# Patient Record
Sex: MALE | Race: WHITE | Employment: FULL TIME
[De-identification: names, ages, dates, MRNs, and addresses within clinical notes are randomized per-mention and may not be internally consistent; named-entity substitution may affect disease eponyms.]

---

## 2017-01-05 ENCOUNTER — OFFICE VISIT (OUTPATIENT)
Dept: FAMILY MEDICINE CLINIC | Facility: CLINIC | Age: 47
End: 2017-01-05

## 2017-01-05 VITALS
BODY MASS INDEX: 25.33 KG/M2 | WEIGHT: 171 LBS | SYSTOLIC BLOOD PRESSURE: 133 MMHG | HEART RATE: 68 BPM | HEIGHT: 69 IN | DIASTOLIC BLOOD PRESSURE: 80 MMHG

## 2017-01-05 DIAGNOSIS — M25.561 CHRONIC PAIN OF RIGHT KNEE: Primary | ICD-10-CM

## 2017-01-05 DIAGNOSIS — G89.29 CHRONIC PAIN OF RIGHT KNEE: Primary | ICD-10-CM

## 2017-01-05 PROCEDURE — 99213 OFFICE O/P EST LOW 20 MIN: CPT | Performed by: INTERNAL MEDICINE

## 2017-01-06 ENCOUNTER — TELEPHONE (OUTPATIENT)
Dept: ORTHOPEDIC SURGERY | Facility: CLINIC | Age: 47
End: 2017-01-06

## 2017-01-10 ENCOUNTER — TELEPHONE (OUTPATIENT)
Dept: ORTHOPEDIC SURGERY | Facility: CLINIC | Age: 47
End: 2017-01-10

## 2017-03-21 DIAGNOSIS — M25.561 CHRONIC PAIN OF RIGHT KNEE: Primary | ICD-10-CM

## 2017-03-21 DIAGNOSIS — G89.29 CHRONIC PAIN OF RIGHT KNEE: Primary | ICD-10-CM

## 2017-03-23 ENCOUNTER — OFFICE VISIT (OUTPATIENT)
Dept: ORTHOPEDIC SURGERY | Age: 47
End: 2017-03-23
Payer: COMMERCIAL

## 2017-03-23 VITALS — HEIGHT: 69 IN | WEIGHT: 169.75 LBS | BODY MASS INDEX: 25.14 KG/M2

## 2017-03-23 DIAGNOSIS — M17.11 ARTHRITIS OF RIGHT KNEE: Primary | ICD-10-CM

## 2017-03-23 PROCEDURE — 99243 OFF/OP CNSLTJ NEW/EST LOW 30: CPT | Performed by: ORTHOPAEDIC SURGERY

## 2017-03-23 RX ORDER — DICLOFENAC SODIUM 75 MG/1
75 TABLET, DELAYED RELEASE ORAL 2 TIMES DAILY
Qty: 60 TABLET | Refills: 2 | Status: SHIPPED | OUTPATIENT
Start: 2017-03-23 | End: 2017-09-26

## 2017-03-23 ASSESSMENT — ENCOUNTER SYMPTOMS
CONSTIPATION: 0
DIARRHEA: 0
NAUSEA: 0
COUGH: 0

## 2017-09-26 ENCOUNTER — OFFICE VISIT (OUTPATIENT)
Dept: FAMILY MEDICINE CLINIC | Age: 47
End: 2017-09-26
Payer: COMMERCIAL

## 2017-09-26 VITALS
RESPIRATION RATE: 16 BRPM | DIASTOLIC BLOOD PRESSURE: 82 MMHG | BODY MASS INDEX: 24.68 KG/M2 | WEIGHT: 166.6 LBS | SYSTOLIC BLOOD PRESSURE: 131 MMHG | TEMPERATURE: 98.5 F | HEIGHT: 69 IN | HEART RATE: 74 BPM

## 2017-09-26 DIAGNOSIS — M25.561 CHRONIC PAIN OF RIGHT KNEE: Primary | ICD-10-CM

## 2017-09-26 DIAGNOSIS — G89.29 CHRONIC PAIN OF RIGHT KNEE: Primary | ICD-10-CM

## 2017-09-26 DIAGNOSIS — Z13.1 DIABETES MELLITUS SCREENING: ICD-10-CM

## 2017-09-26 LAB — HBA1C MFR BLD: 5 %

## 2017-09-26 PROCEDURE — 83036 HEMOGLOBIN GLYCOSYLATED A1C: CPT | Performed by: INTERNAL MEDICINE

## 2017-09-26 PROCEDURE — 99213 OFFICE O/P EST LOW 20 MIN: CPT | Performed by: INTERNAL MEDICINE

## 2020-01-20 ENCOUNTER — OFFICE VISIT (OUTPATIENT)
Dept: FAMILY MEDICINE CLINIC | Age: 50
End: 2020-01-20
Payer: COMMERCIAL

## 2020-01-20 VITALS
HEIGHT: 69 IN | DIASTOLIC BLOOD PRESSURE: 88 MMHG | TEMPERATURE: 97.3 F | HEART RATE: 59 BPM | WEIGHT: 162 LBS | OXYGEN SATURATION: 96 % | BODY MASS INDEX: 23.99 KG/M2 | SYSTOLIC BLOOD PRESSURE: 135 MMHG

## 2020-01-20 PROCEDURE — 99214 OFFICE O/P EST MOD 30 MIN: CPT | Performed by: NURSE PRACTITIONER

## 2020-01-20 RX ORDER — PREDNISONE 20 MG/1
20 TABLET ORAL DAILY
COMMUNITY
End: 2020-04-05

## 2020-01-20 RX ORDER — BENZONATATE 100 MG/1
100 CAPSULE ORAL 3 TIMES DAILY PRN
COMMUNITY
End: 2020-07-07

## 2020-01-20 RX ORDER — AZITHROMYCIN 250 MG/1
250 TABLET, FILM COATED ORAL DAILY
COMMUNITY
End: 2020-04-05

## 2020-01-20 RX ORDER — VARENICLINE TARTRATE 25 MG
KIT ORAL
Qty: 1 BOX | Refills: 0 | Status: SHIPPED | OUTPATIENT
Start: 2020-01-20 | End: 2020-07-13

## 2020-01-20 RX ORDER — VARENICLINE TARTRATE 1 MG/1
1 TABLET, FILM COATED ORAL 2 TIMES DAILY
Qty: 60 TABLET | Refills: 3 | Status: SHIPPED | OUTPATIENT
Start: 2020-01-20 | End: 2020-07-13

## 2020-01-20 ASSESSMENT — PATIENT HEALTH QUESTIONNAIRE - PHQ9
SUM OF ALL RESPONSES TO PHQ9 QUESTIONS 1 & 2: 0
SUM OF ALL RESPONSES TO PHQ QUESTIONS 1-9: 0
SUM OF ALL RESPONSES TO PHQ QUESTIONS 1-9: 0
2. FEELING DOWN, DEPRESSED OR HOPELESS: 0
1. LITTLE INTEREST OR PLEASURE IN DOING THINGS: 0

## 2020-01-20 ASSESSMENT — ENCOUNTER SYMPTOMS
COUGH: 1
SINUS PRESSURE: 0
RHINORRHEA: 0
SORE THROAT: 0
SHORTNESS OF BREATH: 0
HEMOPTYSIS: 0
TROUBLE SWALLOWING: 0
HEARTBURN: 0
ABDOMINAL PAIN: 0
WHEEZING: 0
CHEST TIGHTNESS: 0

## 2020-01-20 NOTE — PROGRESS NOTES
[facility]  [address]  [place], [address]  [phone]      En Dickinson is a 44 y.o. male who presents today for his  medicalconditions/complaints as noted below. En Dickinson is c/o of New Patient (was having hard time getting in with old pcp) and Cough (chronic for last few months,went to clinic at [facility] last Wed night,given abx,steroids,tessalon perles. finsished abx/steroids. had prior chest congestion,better now)  . HPI:    Pt. Here to [facility]. He was recently seen in urgent care and had cxr at [facility]. He was told it was unremarkable. He is concerned about cough and is ready to quit smoking. Cough   This is a chronic problem. The current episode started more than 1 month ago (about 6 months ago). The problem has been waxing and waning. The cough is non-productive (was wet for about 2 weeks and was treated with steroids and abx and wetness resolved). Pertinent negatives include no chest pain, chills, ear congestion, ear pain, fever, headaches, heartburn, hemoptysis, myalgias, nasal congestion, postnasal drip, rhinorrhea, sore throat, shortness of breath, sweats, weight loss or wheezing. Nothing aggravates the symptoms. Risk factors for lung disease include smoking/tobacco exposure. He has tried rest, oral steroids, prescription cough suppressant, cool air and body position changes (claritin, other allergy pills, zpack ) for the symptoms. The treatment provided no relief. There is no history of asthma, bronchitis, COPD, environmental allergies or pneumonia. Past Medical History:   Diagnosis Date    Tobacco abuse       History reviewed. No pertinent surgical history.   Family History   Problem Relation Age of Onset   [facility] Cancer Mother         lung, cancer    No Known Problems Father     No Known Problems Sister      Social History     Tobacco Use    Smoking status: Current Every Day Smoker     Packs/day: 0.25     Types: Cigarettes     Last attempt to quit: 2015     Years since quittin.6    Smokeless tobacco: Former User   Substance Use Topics    Alcohol use: Yes     Alcohol/week: 0.0 standard drinks     Comment: 2-3 times per week      Current Outpatient Medications   Medication Sig Dispense Refill    azithromycin (ZITHROMAX) 250 MG tablet Take 250 mg by mouth daily      benzonatate (TESSALON) 100 MG capsule Take 100 mg by mouth 3 times daily as needed for Cough      predniSONE (DELTASONE) 20 MG tablet Take 20 mg by mouth daily      varenicline (CHANTIX STARTING MONTH ) 0.5 MG X 11 & 1 MG X 42 tablet Take by mouth. 1 box 0    varenicline (CHANTIX CONTINUING MONTH ) 1 MG tablet Take 1 tablet by mouth 2 times daily 60 tablet 3     No current facility-administered medications for this visit. No Known Allergies    Health Maintenance   Topic Date Due    Shingles Vaccine (1 of 2) 2018    Colon cancer screen colonoscopy  2018    DTaP/Tdap/Td vaccine (1 - Tdap) 2021 (Originally 1979)    Flu vaccine (1) 2021 (Originally 2019)    Pneumococcal 0-64 years Vaccine (1 of 1 - PPSV23) 10/13/2021 (Originally 1974)    Lipid screen  2021    HIV screen  Completed       Subjective:      Review of Systems   Constitutional: Negative for activity change, appetite change, chills, diaphoresis, fatigue, fever, unexpected weight change and weight loss. HENT: Negative for congestion, ear discharge, ear pain, hearing loss, postnasal drip, rhinorrhea, sinus pressure, sneezing, sore throat and trouble swallowing. Respiratory: Positive for cough. Negative for hemoptysis, chest tightness, shortness of breath and wheezing. Cardiovascular: Negative for chest pain and palpitations. Gastrointestinal: Negative for abdominal pain and heartburn. Genitourinary: Negative for difficulty urinating. Musculoskeletal: Negative for myalgias. Allergic/Immunologic: Negative for environmental allergies and food allergies. Thought Content: Thought content normal.         Judgment: Judgment normal.         Assessment:       Diagnosis Orders   1. Cough present for greater than 3 weeks  Allergen, Region 5 Respiratory Panel    Full PFT Study With Bronchodilator   2. Tobacco dependence  CBC    Full PFT Study With Bronchodilator    varenicline (CHANTIX STARTING MONTH AMY) 0.5 MG X 11 & 1 MG X 42 tablet    varenicline (CHANTIX CONTINUING MONTH AMY) 1 MG tablet   3. Establishing care with new doctor, encounter for     4. Routine general medical examination at a health care facility  Lipid Panel    CBC    Comprehensive Metabolic Panel    PSA Screening   5. Screening for lipid disorders  Lipid Panel   6. Screening for malignant neoplasm of prostate  PSA Screening   7. Screening for diabetes mellitus  Hemoglobin A1C   8. Screen for colon cancer  Cologuard (For External Results Only)     Wt Readings from Last 3 Encounters:   01/20/20 162 lb (73.5 kg)   09/26/17 166 lb 9.6 oz (75.6 kg)   03/23/17 169 lb 12.1 oz (77 kg)       Plan:      Return if symptoms worsen or fail to improve. Orders Placed This Encounter   Procedures    Cologuard (For External Results Only)     This test is performed by an external laboratory and is used for result attachment only. It is required that this order requisition be faxed to: [employer] @ [phone]. See [email or web address] for further information.      Standing Status:   Future     Standing Expiration Date:   1/20/2021    Lipid Panel     Standing Status:   Future     Standing Expiration Date:   1/20/2021     Order Specific Question:   Is Patient Fasting?/# of Hours     Answer:   8 hrs    CBC     Standing Status:   Future     Standing Expiration Date:   1/20/2021    Comprehensive Metabolic Panel     Standing Status:   Future     Standing Expiration Date:   1/20/2021    PSA Screening     Standing Status:   Future     Standing Expiration Date:   1/20/2021    Hemoglobin A1C     Standing Status: Future     Standing Expiration Date:   1/20/2021    Allergen, Region 5 Respiratory Panel     Standing Status:   Future     Standing Expiration Date:   1/19/2021    Full PFT Study With Bronchodilator     Standing Status:   Future     Standing Expiration Date:   1/20/2021     Orders Placed This Encounter   Medications    varenicline (CHANTIX STARTING MONTH PAK) 0.5 MG X 11 & 1 MG X 42 tablet     Sig: Take by mouth. Dispense:  1 box     Refill:  0    varenicline (CHANTIX CONTINUING MONTH PAK) 1 MG tablet     Sig: Take 1 tablet by mouth 2 times daily     Dispense:  60 tablet     Refill:  3   cough; Will request recent cxr results  Check PFT  Check other labs  Will call with test results  He is ready to quit smoking and agreed to trial chantix, please call if not tolerating to start other option  Avoid smoking marijuana also if able    Send back colo guard asap  Declines vaccines    Patient given educational materials - see patient instructions. Discussed use,benefit, and side effects of prescribed medications. All patient questions answered. Pt voiced understanding. Reviewed health maintenance. Instructed to continue currentmedications, diet and exercise.     Electronically signed by Mandy Kaba CNP on 1/20/2020 at 2:25 PM

## 2020-01-20 NOTE — PROGRESS NOTES
Visit Information    Have you changed or started any medications since your last visit including any over-the-counter medicines, vitamins, or herbal medicines? no   Have you stopped taking any of your medications? Is so, why? -  no  Are you having any side effects from any of your medications? - no    Have you seen any other physician or provider since your last visit?  no   Have you had any other diagnostic tests since your last visit?  no   Have you been seen in the emergency room and/or had an admission in a hospital since we last saw you?  no   Have you had your routine dental cleaning in the past 6 months?  no     Do you have an active MyChart account? If no, what is the barrier?   Yes    Patient Care Team:  ESTELA Moore CNP as PCP - General (Family Nurse Practitioner)  Garrett Crisostomo MD as PCP - [facility] Provider    Medical History Review  Past Medical, Family, and Social History reviewed and  contribute to the patient presenting condition    Health Maintenance   Topic Date Due    DTaP/Tdap/Td vaccine (1 - Tdap) 07/23/1979    Shingles Vaccine (1 of 2) 07/23/2018    Colon cancer screen colonoscopy  07/23/2018    Flu vaccine (1) 09/01/2019    Lipid screen  12/19/2021    HIV screen  Completed    Pneumococcal 0-64 years Vaccine  Aged Out

## 2020-02-05 ENCOUNTER — TELEPHONE (OUTPATIENT)
Dept: FAMILY MEDICINE CLINIC | Age: 50
End: 2020-02-05

## 2020-03-25 ENCOUNTER — NURSE TRIAGE (OUTPATIENT)
Dept: OTHER | Facility: CLINIC | Age: 50
End: 2020-03-25

## 2020-04-05 ENCOUNTER — E-VISIT (OUTPATIENT)
Dept: FAMILY MEDICINE CLINIC | Age: 50
End: 2020-04-05
Payer: COMMERCIAL

## 2020-04-05 PROCEDURE — 98970 NQHP OL DIG ASSMT&MGMT 5-10: CPT | Performed by: PHYSICIAN ASSISTANT

## 2020-04-05 RX ORDER — METHYLPREDNISOLONE 4 MG/1
TABLET ORAL
Qty: 1 KIT | Refills: 0 | Status: SHIPPED | OUTPATIENT
Start: 2020-04-05 | End: 2020-04-11

## 2020-04-05 RX ORDER — AZITHROMYCIN 250 MG/1
TABLET, FILM COATED ORAL
Qty: 1 PACKET | Refills: 0 | Status: SHIPPED | OUTPATIENT
Start: 2020-04-05 | End: 2020-04-15

## 2020-04-05 ASSESSMENT — LIFESTYLE VARIABLES
PACKS_PER_DAY: 0
SMOKING_STATUS: NO, I'M A FORMER SMOKER
SMOKING_YEARS: 5

## 2020-07-07 ENCOUNTER — TELEMEDICINE (OUTPATIENT)
Dept: FAMILY MEDICINE CLINIC | Age: 50
End: 2020-07-07
Payer: COMMERCIAL

## 2020-07-07 PROCEDURE — 99213 OFFICE O/P EST LOW 20 MIN: CPT | Performed by: NURSE PRACTITIONER

## 2020-07-07 RX ORDER — DILTIAZEM HYDROCHLORIDE 240 MG/1
240 CAPSULE, EXTENDED RELEASE ORAL DAILY
COMMUNITY
End: 2020-07-07 | Stop reason: SDUPTHER

## 2020-07-07 RX ORDER — TRIAMTERENE AND HYDROCHLOROTHIAZIDE 37.5; 25 MG/1; MG/1
1 TABLET ORAL DAILY
Qty: 14 TABLET | Refills: 0 | Status: SHIPPED | OUTPATIENT
Start: 2020-07-07 | End: 2020-07-13 | Stop reason: SDUPTHER

## 2020-07-07 RX ORDER — DILTIAZEM HYDROCHLORIDE 240 MG/1
240 CAPSULE, EXTENDED RELEASE ORAL DAILY
Qty: 14 CAPSULE | Refills: 0 | Status: SHIPPED | OUTPATIENT
Start: 2020-07-07 | End: 2020-07-13 | Stop reason: SDUPTHER

## 2020-07-07 RX ORDER — TRIAMTERENE AND HYDROCHLOROTHIAZIDE 37.5; 25 MG/1; MG/1
1 TABLET ORAL DAILY
COMMUNITY
End: 2020-07-07 | Stop reason: SDUPTHER

## 2020-07-08 ENCOUNTER — HOSPITAL ENCOUNTER (OUTPATIENT)
Age: 50
Setting detail: SPECIMEN
Discharge: HOME | End: 2020-07-08
Payer: COMMERCIAL

## 2020-07-08 LAB — TSH SERPL DL<=0.05 MIU/L-ACNC: 0.36 MIU/L (ref 0.3–5)

## 2020-07-10 LAB
ALDOSTERONE COMMENT: NORMAL
ALDOSTERONE: 24.6 NG/DL
RENIN ACTIVITY: 5.1 NG/ML/HR
RENIN COMMENT: NORMAL

## 2020-07-13 ENCOUNTER — OFFICE VISIT (OUTPATIENT)
Dept: FAMILY MEDICINE CLINIC | Age: 50
End: 2020-07-13
Payer: COMMERCIAL

## 2020-07-13 VITALS
SYSTOLIC BLOOD PRESSURE: 161 MMHG | TEMPERATURE: 98.8 F | HEIGHT: 69 IN | HEART RATE: 105 BPM | BODY MASS INDEX: 23.7 KG/M2 | DIASTOLIC BLOOD PRESSURE: 115 MMHG | OXYGEN SATURATION: 97 % | WEIGHT: 160 LBS

## 2020-07-13 PROCEDURE — 99214 OFFICE O/P EST MOD 30 MIN: CPT | Performed by: NURSE PRACTITIONER

## 2020-07-13 PROCEDURE — 93000 ELECTROCARDIOGRAM COMPLETE: CPT | Performed by: NURSE PRACTITIONER

## 2020-07-13 RX ORDER — TRIAMTERENE AND HYDROCHLOROTHIAZIDE 37.5; 25 MG/1; MG/1
1 TABLET ORAL DAILY
Qty: 30 TABLET | Refills: 0 | Status: SHIPPED | OUTPATIENT
Start: 2020-07-13 | End: 2020-09-02 | Stop reason: SDUPTHER

## 2020-07-13 RX ORDER — DILTIAZEM HYDROCHLORIDE 240 MG/1
240 CAPSULE, EXTENDED RELEASE ORAL DAILY
Qty: 30 CAPSULE | Refills: 0 | Status: SHIPPED | OUTPATIENT
Start: 2020-07-13 | End: 2020-09-02 | Stop reason: SDUPTHER

## 2020-07-13 RX ORDER — HYDRALAZINE HYDROCHLORIDE 25 MG/1
25 TABLET, FILM COATED ORAL 2 TIMES DAILY
Qty: 60 TABLET | Refills: 3 | Status: SHIPPED | OUTPATIENT
Start: 2020-07-13 | End: 2020-09-02 | Stop reason: SDUPTHER

## 2020-07-13 ASSESSMENT — ENCOUNTER SYMPTOMS
COUGH: 0
VOMITING: 0
ABDOMINAL PAIN: 0
CHEST TIGHTNESS: 0
SHORTNESS OF BREATH: 0
NAUSEA: 0

## 2020-07-13 NOTE — PROGRESS NOTES
[facility]  [address]  [place], [address]  [phone]      Angelita Barnes is a 44 y.o. male who presents today for his  medicalconditions/complaints as noted below. Angelita Barnes is c/o of Hypertension (took meds this morning,brought home BP machine to compare)      HPI:    HPI  Pt. Here for BP recheck. He has brought in his machine for comparison. He states since he went into rehab for alcohol abuse it has been high which is who started him on his meds. He is tolerating both well with no SE. He is stressed out at home and not sleeping the best. He is now drinking 1 pint of liquor 2-3 times per week currently. He is aware he needs to quit but can't right now. He had recent blood work done. Denies chest pain, sob, headaches or dizziness. He also requests FMLA to reduce work hours 3 days per week so he can see his child and not be forced into 12 hour shifts. He feels this is contributing to his  Stress and BP. Past Medical History:   Diagnosis Date    Tobacco abuse       No past surgical history on file. Family History   Problem Relation Age of Onset   [facility] Cancer Mother         lung, cancer    No Known Problems Father     No Known Problems Sister     Hypertension Paternal Grandfather      Social History     Tobacco Use    Smoking status: Current Every Day Smoker     Packs/day: 0.25     Types: Cigarettes     Last attempt to quit: 2015     Years since quittin.1    Smokeless tobacco: Former User   Substance Use Topics    Alcohol use:  Yes     Alcohol/week: 0.0 standard drinks     Comment: 2-3 times per week      Current Outpatient Medications   Medication Sig Dispense Refill    hydrALAZINE (APRESOLINE) 25 MG tablet Take 1 tablet by mouth 2 times daily 60 tablet 3    triamterene-hydroCHLOROthiazide (MAXZIDE-25) 37.5-25 MG per tablet Take 1 tablet by mouth daily 30 tablet 0    dilTIAZem (DILACOR XR) 240 MG extended release capsule Take 1 capsule by mouth daily 30 capsule 0     No current facility-administered medications for this visit. No Known Allergies    Health Maintenance   Topic Date Due    Potassium monitoring  03/13/2017    Creatinine monitoring  03/13/2017    Shingles Vaccine (1 of 2) 07/23/2018    DTaP/Tdap/Td vaccine (1 - Tdap) 01/20/2021 (Originally 7/23/1987)    Pneumococcal 0-64 years Vaccine (1 of 1 - PPSV23) 10/13/2021 (Originally 7/23/1974)    Flu vaccine (1) 09/01/2020    Lipid screen  12/19/2021    Colon cancer screen fecal DNA test (Cologuard)  02/25/2023    HIV screen  Completed    Hepatitis A vaccine  Aged Out    Hepatitis B vaccine  Aged Out    Hib vaccine  Aged Out    Meningococcal (ACWY) vaccine  Aged Out       Subjective:      Review of Systems   Constitutional: Positive for activity change. Negative for appetite change, chills, diaphoresis, fatigue and fever. Eyes: Negative for visual disturbance. Respiratory: Negative for cough, chest tightness and shortness of breath. Cardiovascular: Negative for chest pain, palpitations and leg swelling. Gastrointestinal: Negative for abdominal pain, nausea and vomiting. Genitourinary: Negative for difficulty urinating. Skin: Negative for rash. Neurological: Negative for dizziness, weakness, light-headedness, numbness and headaches. Hematological: Negative for adenopathy. Psychiatric/Behavioral: Positive for sleep disturbance. The patient is nervous/anxious. Objective:      Physical Exam  Vitals signs and nursing note reviewed. Constitutional:       General: He is not in acute distress. Appearance: Normal appearance. He is well-developed. He is not ill-appearing or diaphoretic. HENT:      Head: Normocephalic and atraumatic. Neck:      Musculoskeletal: Neck supple. Cardiovascular:      Rate and Rhythm: Normal rate and regular rhythm. Pulses: Normal pulses. Heart sounds: Normal heart sounds.       Comments: No LE edema  Pulmonary:      Effort: Refill:  0     HTN:  Uncontrolled  Pt did not get all of labs done as prev. Ordered, re printed to get done asap  Will call with test results  Avoid tobacco, drugs and alcohol ( using all currently)  Add hydralazine bid for now  Referral for cardio given, please tomorrow for an appt  Go to Er if any chest pain, syncope, weakness or stroke symptoms  Continue other two bp meds for now also  DASH LF diet, avoid caffeine    Will complete FMLA based on court schd. When obtained      Patient given educational materials - see patient instructions. Discussed use,benefit, and side effects of prescribed medications. All patient questions answered. Pt voiced understanding. Reviewed health maintenance. Instructed to continue currentmedications, diet and exercise.     Electronically signed by Jillian Kaba CNP on 7/13/2020 at 5:44 PM

## 2020-07-13 NOTE — PROGRESS NOTES
Visit Information    Have you changed or started any medications since your last visit including any over-the-counter medicines, vitamins, or herbal medicines? no   Have you stopped taking any of your medications? Is so, why? -  no  Are you having any side effects from any of your medications? - no    Have you seen any other physician or provider since your last visit?  no   Have you had any other diagnostic tests since your last visit?  no   Have you been seen in the emergency room and/or had an admission in a hospital since we last saw you?  no   Have you had your routine dental cleaning in the past 6 months?  no     Do you have an active MyChart account? If no, what is the barrier?   Yes    Patient Care Team:  ESTELA Chau CNP as PCP - General (Family Nurse Practitioner)  ESTELA Chau CNP as PCP - [facility]    Medical History Review  Past Medical, Family, and Social History reviewed and  contribute to the patient presenting condition    Health Maintenance   Topic Date Due    Potassium monitoring  03/13/2017    Creatinine monitoring  03/13/2017    Shingles Vaccine (1 of 2) 07/23/2018    DTaP/Tdap/Td vaccine (1 - Tdap) 01/20/2021 (Originally 7/23/1987)    Pneumococcal 0-64 years Vaccine (1 of 1 - PPSV23) 10/13/2021 (Originally 7/23/1974)    Flu vaccine (1) 09/01/2020    Lipid screen  12/19/2021    Colon cancer screen fecal DNA test (Cologuard)  02/25/2023    HIV screen  Completed    Hepatitis A vaccine  Aged Out    Hepatitis B vaccine  Aged Out    Hib vaccine  Aged Out    Meningococcal (ACWY) vaccine  Aged Out

## 2020-08-11 ENCOUNTER — E-VISIT (OUTPATIENT)
Dept: FAMILY MEDICINE CLINIC | Age: 50
End: 2020-08-11
Payer: COMMERCIAL

## 2020-08-11 ENCOUNTER — TELEPHONE (OUTPATIENT)
Dept: FAMILY MEDICINE CLINIC | Age: 50
End: 2020-08-11

## 2020-08-11 PROCEDURE — 98970 NQHP OL DIG ASSMT&MGMT 5-10: CPT | Performed by: NURSE PRACTITIONER

## 2020-08-11 RX ORDER — HYDROXYZINE HYDROCHLORIDE 25 MG/1
TABLET, FILM COATED ORAL
Qty: 30 TABLET | Refills: 0 | Status: SHIPPED | OUTPATIENT
Start: 2020-08-11 | End: 2020-09-02 | Stop reason: SDUPTHER

## 2021-01-27 ENCOUNTER — OFFICE VISIT (OUTPATIENT)
Dept: FAMILY MEDICINE CLINIC | Age: 51
End: 2021-01-27
Payer: COMMERCIAL

## 2021-01-27 VITALS
HEART RATE: 71 BPM | BODY MASS INDEX: 27.28 KG/M2 | HEIGHT: 69 IN | SYSTOLIC BLOOD PRESSURE: 118 MMHG | DIASTOLIC BLOOD PRESSURE: 78 MMHG | TEMPERATURE: 97.2 F | OXYGEN SATURATION: 97 % | WEIGHT: 184.2 LBS

## 2021-01-27 DIAGNOSIS — Z00.00 ROUTINE GENERAL MEDICAL EXAMINATION AT A HEALTH CARE FACILITY: ICD-10-CM

## 2021-01-27 DIAGNOSIS — M25.552 BILATERAL HIP PAIN: Primary | ICD-10-CM

## 2021-01-27 DIAGNOSIS — Z11.59 ENCOUNTER FOR HEPATITIS C SCREENING TEST FOR LOW RISK PATIENT: ICD-10-CM

## 2021-01-27 DIAGNOSIS — Z13.1 SCREENING FOR DIABETES MELLITUS: ICD-10-CM

## 2021-01-27 DIAGNOSIS — Z12.5 SCREENING FOR MALIGNANT NEOPLASM OF PROSTATE: ICD-10-CM

## 2021-01-27 DIAGNOSIS — Z13.220 SCREENING FOR LIPID DISORDERS: ICD-10-CM

## 2021-01-27 DIAGNOSIS — Z23 NEED FOR TDAP VACCINATION: ICD-10-CM

## 2021-01-27 DIAGNOSIS — M25.551 BILATERAL HIP PAIN: Primary | ICD-10-CM

## 2021-01-27 PROCEDURE — 99213 OFFICE O/P EST LOW 20 MIN: CPT | Performed by: NURSE PRACTITIONER

## 2021-01-27 PROCEDURE — 90471 IMMUNIZATION ADMIN: CPT | Performed by: NURSE PRACTITIONER

## 2021-01-27 PROCEDURE — 90715 TDAP VACCINE 7 YRS/> IM: CPT | Performed by: NURSE PRACTITIONER

## 2021-01-27 SDOH — HEALTH STABILITY: MENTAL HEALTH: HOW MANY STANDARD DRINKS CONTAINING ALCOHOL DO YOU HAVE ON A TYPICAL DAY?: NOT ASKED

## 2021-01-27 SDOH — HEALTH STABILITY: MENTAL HEALTH: HOW OFTEN DO YOU HAVE A DRINK CONTAINING ALCOHOL?: NOT ASKED

## 2021-01-27 ASSESSMENT — ENCOUNTER SYMPTOMS
ABDOMINAL PAIN: 0
SHORTNESS OF BREATH: 0
VOMITING: 0
BACK PAIN: 0
CHEST TIGHTNESS: 0
NAUSEA: 0
COUGH: 0

## 2021-01-27 ASSESSMENT — PATIENT HEALTH QUESTIONNAIRE - PHQ9
SUM OF ALL RESPONSES TO PHQ QUESTIONS 1-9: 0
1. LITTLE INTEREST OR PLEASURE IN DOING THINGS: 0
2. FEELING DOWN, DEPRESSED OR HOPELESS: 0

## 2021-01-27 NOTE — PROGRESS NOTES
[facility]  [address]  [place], [address]  [phone]      Klaudia Fields is a 44 y.o. male who presents today for his  medicalconditions/complaints as noted below. Klaudia Fields is c/o of Hip Pain (bilateral,going on in last few months,generalized in hip area)  . HPI:    HPI  Pt. Here today for evaluation of bilateral stiffness in hips. He has gained about 20 lbs. Since quitting drinking last summer. 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 He denies pain or leg weakness. Denies low back pain or changes to bowel or bladder. He states the stiffness gets worse as the day goes on. He has tried osteo biflex, tumeric and glucosamine with mild relief. He does not want meds. He would like other screening orders. He has been checking his BP at home and has been normal since quitting drinking and not taking meds anymore. Past Medical History:   Diagnosis Date    Tobacco abuse       No past surgical history on file.   Family History   Problem Relation Age of Onset   [facility] Cancer Mother         lung, cancer    No Known Problems Father     No Known Problems Sister     Hypertension Paternal Grandfather      Social History     Tobacco Use    Smoking status: Light Tobacco Smoker     Packs/day: 0.25     Types: Cigarettes    Smokeless tobacco: Former User    Tobacco comment: smokes occasionally   Substance Use Topics    Alcohol use: Not Currently      Current Outpatient Medications   Medication Sig Dispense Refill    hydrALAZINE (APRESOLINE) 25 MG tablet Take 1 tablet by mouth 2 times daily (Patient not taking: Reported on 1/27/2021) 60 tablet 3    triamterene-hydroCHLOROthiazide (MAXZIDE-25) 37.5-25 MG per tablet Take 1 tablet by mouth daily (Patient not taking: Reported on 1/27/2021) 30 tablet 3    dilTIAZem (DILACOR XR) 240 MG extended release capsule Take 1 capsule by mouth daily (Patient not taking: Reported on 1/27/2021) 30 capsule 3 No current facility-administered medications for this visit. No Known Allergies    Health Maintenance   Topic Date Due    Hepatitis C screen  1970    Potassium monitoring  03/13/2017    Creatinine monitoring  03/13/2017    Shingles Vaccine (1 of 2) 07/23/2018    Diabetes screen  09/26/2020    Pneumococcal 0-64 years Vaccine (1 of 1 - PPSV23) 10/13/2021 (Originally 7/23/1974)    Flu vaccine (1) 01/27/2022 (Originally 9/1/2020)    Lipid screen  12/19/2021    Colon cancer screen fecal DNA test (Cologuard)  02/25/2023    DTaP/Tdap/Td vaccine (2 - Td) 01/27/2031    HIV screen  Completed    Hepatitis A vaccine  Aged Out    Hepatitis B vaccine  Aged Out    Hib vaccine  Aged Out    Meningococcal (ACWY) vaccine  Aged Out       Subjective:      Review of Systems   Constitutional: Positive for activity change. Negative for appetite change, chills, diaphoresis, fatigue and fever. Eyes: Negative for visual disturbance. Respiratory: Negative for cough, chest tightness and shortness of breath. Cardiovascular: Negative for chest pain, palpitations and leg swelling. Gastrointestinal: Negative for abdominal pain, nausea and vomiting. Genitourinary: Negative for decreased urine volume and difficulty urinating. Musculoskeletal: Positive for arthralgias. Negative for back pain, gait problem, joint swelling, myalgias and neck pain. Skin: Negative for rash. Neurological: Negative for dizziness, weakness, light-headedness, numbness and headaches. Hematological: Negative for adenopathy. Psychiatric/Behavioral: Negative for dysphoric mood and sleep disturbance. The patient is not nervous/anxious. Objective:      Physical Exam  Vitals signs and nursing note reviewed. Constitutional:       General: He is not in acute distress. Appearance: Normal appearance. He is well-developed and normal weight. He is not ill-appearing or diaphoretic.    HENT: Orders Placed This Encounter   Procedures    XR HIP 3-4 VW W PELVIS BILATERAL     Standing Status:   Future     Standing Expiration Date:   1/27/2022     Order Specific Question:   Reason for exam:     Answer:   stiffness    Tdap (age 6y and older) IM ([address])    Lipid Panel     Standing Status:   Future     Standing Expiration Date:   1/27/2022     Order Specific Question:   Is Patient Fasting?/# of Hours     Answer:   8 hrs    CBC With Auto Differential     Standing Status:   Future     Standing Expiration Date:   1/27/2022    Comprehensive Metabolic Panel     Standing Status:   Future     Standing Expiration Date:   1/27/2022    Hemoglobin A1C     Standing Status:   Future     Standing Expiration Date:   1/27/2022    PSA Screening     Standing Status:   Future     Standing Expiration Date:   1/27/2022    Hepatitis C Antibody     Standing Status:   Future     Standing Expiration Date:   1/28/2022     Health Maintenance reviewed - tetanus booster given, glycohemoglobin ordered. Update routine labs  Will call with test results    Hips:  Check formal xrays  Will call with test results  Advised to do some research on foods that naturally reduce inflammation and ok to continue otc supplements for now  Continue to stay active  Avoid tobacco, drugs and alcohol    BP controlled today with no meds    Patient given educational materials - see patient instructions. Discussed use,benefit, and side effects of prescribed medications. All patient questions answered. Pt voiced understanding. Reviewed health maintenance. Instructed to continue currentmedications, diet and exercise.     Electronically signed by Chantel Kaba CNP on 1/27/2021 at 1:43 PM

## 2021-02-03 ENCOUNTER — HOSPITAL ENCOUNTER (OUTPATIENT)
Dept: GENERAL RADIOLOGY | Age: 51
Discharge: HOME | End: 2021-02-05
Payer: COMMERCIAL

## 2021-02-03 ENCOUNTER — HOSPITAL ENCOUNTER (OUTPATIENT)
Age: 51
Discharge: HOME | End: 2021-02-05
Payer: COMMERCIAL

## 2021-02-03 ENCOUNTER — HOSPITAL ENCOUNTER (OUTPATIENT)
Age: 51
Discharge: HOME | End: 2021-02-03
Payer: COMMERCIAL

## 2021-02-03 DIAGNOSIS — Z13.220 SCREENING FOR LIPID DISORDERS: ICD-10-CM

## 2021-02-03 DIAGNOSIS — M25.551 BILATERAL HIP PAIN: ICD-10-CM

## 2021-02-03 DIAGNOSIS — Z12.5 SCREENING FOR MALIGNANT NEOPLASM OF PROSTATE: ICD-10-CM

## 2021-02-03 DIAGNOSIS — Z13.1 SCREENING FOR DIABETES MELLITUS: ICD-10-CM

## 2021-02-03 DIAGNOSIS — Z11.59 ENCOUNTER FOR HEPATITIS C SCREENING TEST FOR LOW RISK PATIENT: ICD-10-CM

## 2021-02-03 DIAGNOSIS — Z00.00 ROUTINE GENERAL MEDICAL EXAMINATION AT A HEALTH CARE FACILITY: ICD-10-CM

## 2021-02-03 DIAGNOSIS — M25.552 BILATERAL HIP PAIN: ICD-10-CM

## 2021-02-03 LAB
ABSOLUTE EOS #: 0.2 K/UL (ref 0–0.44)
ABSOLUTE IMMATURE GRANULOCYTE: <0.03 K/UL (ref 0–0.3)
ABSOLUTE LYMPH #: 2.09 K/UL (ref 1.1–3.7)
ABSOLUTE MONO #: 0.81 K/UL (ref 0.1–1.2)
ALBUMIN SERPL-MCNC: 4.1 G/DL (ref 3.5–5.2)
ALBUMIN/GLOBULIN RATIO: 1.3 (ref 1–2.5)
ALP BLD-CCNC: 57 U/L (ref 40–129)
ALT SERPL-CCNC: 20 U/L (ref 5–41)
ANION GAP SERPL CALCULATED.3IONS-SCNC: 8 MMOL/L (ref 9–17)
AST SERPL-CCNC: 18 U/L
BASOPHILS # BLD: 1 % (ref 0–2)
BASOPHILS ABSOLUTE: 0.05 K/UL (ref 0–0.2)
BILIRUB SERPL-MCNC: 0.97 MG/DL (ref 0.3–1.2)
BUN BLDV-MCNC: 14 MG/DL (ref 6–20)
BUN/CREAT BLD: ABNORMAL (ref 9–20)
CALCIUM SERPL-MCNC: 9.6 MG/DL (ref 8.6–10.4)
CHLORIDE BLD-SCNC: 104 MMOL/L (ref 98–107)
CHOLESTEROL/HDL RATIO: 2.9
CHOLESTEROL: 161 MG/DL
CO2: 29 MMOL/L (ref 20–31)
CREAT SERPL-MCNC: 0.84 MG/DL (ref 0.7–1.2)
DIFFERENTIAL TYPE: ABNORMAL
EOSINOPHILS RELATIVE PERCENT: 3 % (ref 1–4)
ESTIMATED AVERAGE GLUCOSE: 108 MG/DL
GFR AFRICAN AMERICAN: >60 ML/MIN
GFR NON-AFRICAN AMERICAN: >60 ML/MIN
GFR SERPL CREATININE-BSD FRML MDRD: ABNORMAL ML/MIN/{1.73_M2}
GFR SERPL CREATININE-BSD FRML MDRD: ABNORMAL ML/MIN/{1.73_M2}
GLUCOSE BLD-MCNC: 97 MG/DL (ref 70–99)
HBA1C MFR BLD: 5.4 % (ref 4–6)
HCT VFR BLD CALC: 47.3 % (ref 40.7–50.3)
HDLC SERPL-MCNC: 55 MG/DL
HEMOGLOBIN: 15.7 G/DL (ref 13–17)
HEPATITIS C ANTIBODY: NONREACTIVE
IMMATURE GRANULOCYTES: 0 %
LDL CHOLESTEROL: 95 MG/DL (ref 0–130)
LYMPHOCYTES # BLD: 31 % (ref 24–43)
MCH RBC QN AUTO: 30.2 PG (ref 25.2–33.5)
MCHC RBC AUTO-ENTMCNC: 33.2 G/DL (ref 28.4–34.8)
MCV RBC AUTO: 91 FL (ref 82.6–102.9)
MONOCYTES # BLD: 12 % (ref 3–12)
NRBC AUTOMATED: 0 PER 100 WBC
PDW BLD-RTO: 11.3 % (ref 11.8–14.4)
PLATELET # BLD: 194 K/UL (ref 138–453)
PLATELET ESTIMATE: ABNORMAL
PMV BLD AUTO: 11 FL (ref 8.1–13.5)
POTASSIUM SERPL-SCNC: 4.4 MMOL/L (ref 3.7–5.3)
PROSTATE SPECIFIC ANTIGEN: 1.87 UG/L
RBC # BLD: 5.2 M/UL (ref 4.21–5.77)
RBC # BLD: ABNORMAL 10*6/UL
SEG NEUTROPHILS: 53 % (ref 36–65)
SEGMENTED NEUTROPHILS ABSOLUTE COUNT: 3.58 K/UL (ref 1.5–8.1)
SODIUM BLD-SCNC: 141 MMOL/L (ref 135–144)
TOTAL PROTEIN: 7.2 G/DL (ref 6.4–8.3)
TRIGL SERPL-MCNC: 55 MG/DL
VLDLC SERPL CALC-MCNC: NORMAL MG/DL (ref 1–30)
WBC # BLD: 6.8 K/UL (ref 3.5–11.3)
WBC # BLD: ABNORMAL 10*3/UL

## 2021-02-03 PROCEDURE — 36415 COLL VENOUS BLD VENIPUNCTURE: CPT

## 2021-02-03 PROCEDURE — 86803 HEPATITIS C AB TEST: CPT

## 2021-02-03 PROCEDURE — 80061 LIPID PANEL: CPT

## 2021-02-03 PROCEDURE — 85025 COMPLETE CBC W/AUTO DIFF WBC: CPT

## 2021-02-03 PROCEDURE — G0103 PSA SCREENING: HCPCS

## 2021-02-03 PROCEDURE — 83036 HEMOGLOBIN GLYCOSYLATED A1C: CPT

## 2021-02-03 PROCEDURE — 80053 COMPREHEN METABOLIC PANEL: CPT

## 2021-02-03 PROCEDURE — 73521 X-RAY EXAM HIPS BI 2 VIEWS: CPT

## 2021-02-04 DIAGNOSIS — M25.552 BILATERAL HIP PAIN: Primary | ICD-10-CM

## 2021-02-04 DIAGNOSIS — M25.551 BILATERAL HIP PAIN: Primary | ICD-10-CM

## 2021-02-05 DIAGNOSIS — M25.551 RIGHT HIP PAIN: ICD-10-CM

## 2021-02-05 DIAGNOSIS — M25.552 LEFT HIP PAIN: Primary | ICD-10-CM

## 2021-02-10 ENCOUNTER — OFFICE VISIT (OUTPATIENT)
Dept: ORTHOPEDIC SURGERY | Age: 51
End: 2021-02-10
Payer: COMMERCIAL

## 2021-02-10 VITALS — WEIGHT: 170 LBS | BODY MASS INDEX: 25.18 KG/M2 | HEIGHT: 69 IN

## 2021-02-10 DIAGNOSIS — M16.0 BILATERAL HIP JOINT ARTHRITIS: Primary | ICD-10-CM

## 2021-02-10 DIAGNOSIS — M76.892 HAMSTRING TENDONITIS OF LEFT THIGH: ICD-10-CM

## 2021-02-10 PROCEDURE — 99214 OFFICE O/P EST MOD 30 MIN: CPT | Performed by: ORTHOPAEDIC SURGERY

## 2021-02-10 RX ORDER — MELOXICAM 15 MG/1
15 TABLET ORAL DAILY
Qty: 30 TABLET | Refills: 3 | Status: SHIPPED | OUTPATIENT
Start: 2021-02-10 | End: 2021-06-22 | Stop reason: SDUPTHER

## 2021-02-10 ASSESSMENT — ENCOUNTER SYMPTOMS
DIARRHEA: 0
COUGH: 0
NAUSEA: 0
CONSTIPATION: 0

## 2021-02-10 NOTE — LETTER
[facility]  [address] [address] [place] [address]  Phone: [phone]  Fax: [address] Vazquez DO        February 10, 2021     Patient: Eleonora Murphy   YOB: 1970   Date of Visit: 2/10/2021       To Whom it May Concern:    Eleonora Murphy was seen in my clinic on 2/10/2021. He may return to work with the following restriction; No more than 10 hours of work per day. If you have any questions or concerns, please don't hesitate to call.     Sincerely,         Edwin Nunez, DO

## 2021-02-10 NOTE — PROGRESS NOTES
[address] [facility] SPORTS [facility]  [address] [address] [place] [address] [place]  [address] [address]  Dept: [phone]  Dept Fax: [phone]        Ambulatory Follow Up      Subjective:   Ramon Zavala is a 44y.o. year old male who presents to our office today for routine followup regarding his   1. Bilateral hip joint arthritis    2. Hamstring tendonitis of left thigh    . Chief Complaint   Patient presents with    Follow-up     Bilateral hip stiffness        HPI- Patient presents to office today for a new problem of bilateral hip pain. Patient mentions the left is worse than the right. Patients says he works as a  which involves 8-12 hours of walking. Patient says he can get through about 8-10 hours okay but after that he starts to get very sore. Patient is fine walking on flat surfaces but uneven ground and stairs causes increase pain especially to his left hip. Patient says both of his hips will get stiff if he is in his truck for awhile. Patient denies any injuries, Patient is an active person. Patient has not done any interventions for his hips. Review of Systems   Constitutional: Negative for chills and fever. Respiratory: Negative for cough. Gastrointestinal: Negative for constipation, diarrhea and nausea. Musculoskeletal: Positive for arthralgias (bilateral hip pain). Negative for gait problem, joint swelling and myalgias. Neurological: Negative for dizziness, weakness and numbness. I have reviewed the CC, HPI, ROS, PMH, FHX, Social History, and if not present in this note, I have reviewed in the patient's chart. I agree with the documentation provided by other staff and have reviewed their documentation prior to providing my signature indicating agreement. Objective :   Ht 5' 9\" (1.753 m)   Wt 170 lb (77.1 kg)   BMI 25.10 kg/m²  Body mass index is 25.1 kg/m².   General: Ramon Zavala is a 44 y.o. male who is alert and oriented and sitting comfortably in our office. Ortho Exam  MS:  Negative hip log roll bilaterally. No limp. No tenderness over the bilateral hips is appreciated. Patient ambulates with antalgia or short weightbearing phase to the bilateral lower extremities. Patient has full range of motion lumbosacral spine. Motor, sensory, vascular examination of bilateral lower extremities is grossly intact without focal deficits. Neuro: alert and oriented to person and place. Eyes: Extra-ocular muscles intact  Mouth: Oral mucosa moist. No perioral lesions  Pulm: Respirations unlabored and regular. Symmetric chest excursion without outward deformity is noted. Skin: warm, well perfused  Psych:   Patient has good fund of knowledge and displays understanging of exam, diagnosis, and plan. Radiology:     Xr Hip Right (1 View)    Result Date: 2/10/2021  History: Right hip pain Findings: Low AP pelvis frog-leg lateral x-ray of the right hip done in the office today shows significant cam lesion and moderate joint space narrowing. No further evidence of fracture, subluxation, dislocation, radiopaque foreign body, radiopaque tumors noted. Impression: Moderate degenerative changes right hip with cam lesion as described above. Xr Hip Left (2-3 Views)    Result Date: 2/10/2021  History: Left hip pain Findings: Low AP pelvis and frog-leg lateral x-ray of the left hip done in the office today shows severe degenerative changes with bone-on-bone gonarthrosis, superior femoral head cystic changes and cam lesion. No further evidence of fracture, subluxation, dislocation Radiopaque foreign body, radiopaque tumors noted. Impression: Severe degenerative changes left hip as described above. Xr Hip Bilateral W Ap Pelvis (2 Views)    Result Date: 2/3/2021  EXAMINATION: ONE XRAY VIEW OF THE PELVIS AND TWO XRAY VIEWS OF EACH OF THE BILATERAL HIPS 2/3/2021 7:26 am COMPARISON: None.  HISTORY: ORDERING SYSTEM PROVIDED HISTORY: Bilateral hip pain TECHNOLOGIST PROVIDED HISTORY: stiffness Reason for Exam: Patient states bilateral hip stiffness x a couple months, no known injury. Acuity: Unknown Type of Exam: Unknown FINDINGS: No acute fracture or dislocation. Left greater than right hip joint space loss. Bilateral CAM deformities at the femoral head/neck junctions. Sacroiliac joints are symmetric with mild degenerative spurring. Bony pelvis appears intact. Overlying soft tissues are grossly negative. Left greater than right hip degenerative changes with cam deformities at the femoral head/neck junctions. No acute abnormality. Assessment:      1. Bilateral hip joint arthritis    2. Hamstring tendonitis of left thigh       Plan:       Discussed etiology and natural history of bilateral hip arthritis. The treatment options may include oral anti-inflammatories, bracing, injections, advanced imaging, activity modification, physical therapy and/or surgical intervention. The patient would like to proceed with mobic and physical therapy. Will also place patient on restriction of no more than 10 hours of work per day. The patient will follow up in 6 weeks. We discussed that the patient should call us with any concerns or questions. Follow up:Return in about 6 weeks (around 3/24/2021) for re- evaluation. Orders Placed This Encounter   Medications    meloxicam (MOBIC) 15 MG tablet     Sig: Take 1 tablet by mouth daily     Dispense:  30 tablet     Refill:  3         Orders Placed This Encounter   Procedures    Ambulatory referral to Physical Therapy     Referral Priority:   Routine     Referral Type:   Eval and Treat     Referral Reason:   Specialty Services Required     Number of Visits Requested:   1     I, Aleksander Cantu RN am scribing for and in the presence of Dr. Vince Stanley  2/13/2021 3:43 PM      I have reviewed and made changes accordingly to the work scribed by Aleksander Cantu RN.   The documentation accurately reflects work and decisions made by me. I have also reviewed documentation completed by clinical staff.     Ann Starkey DO, [facility]  2/13/2021 3:44 PM    This note is created with the assistance of a speech recognition program.  While intending to generate a document that actually reflects the content of the visit, the document can still have some errors including those of syntax and sound a like substitutions which may escape proof reading.  In such instances, actual meaning can be extrapolated by contextual diversion      Electronically signed by Irving Herron on 2/13/2021 at 3:43 PM

## 2021-03-01 ENCOUNTER — HOSPITAL ENCOUNTER (OUTPATIENT)
Dept: PHYSICAL THERAPY | Facility: CLINIC | Age: 51
Setting detail: THERAPIES SERIES
Discharge: HOME | End: 2021-03-01
Payer: COMMERCIAL

## 2021-03-01 PROCEDURE — 97162 PT EVAL MOD COMPLEX 30 MIN: CPT

## 2021-03-01 PROCEDURE — 97110 THERAPEUTIC EXERCISES: CPT

## 2021-03-01 NOTE — CONSULTS
[] [address]  [address]  [phone]  F: [phone] [] [address]  [facility]   [address]  P: [phone]  F: [phone] [] [address]  Therapy  [address]  P: [phone]  F: [phone] [x] [address]  P: [phone]  F: [phone] [] [address]  [facility]   [address]   [place]: [phone]  F: [phone]      Physical Therapy Lower Extremity Evaluation    Date:  3/1/2021  Patient: Los Medley  : 1970  MRN: [id number]  Physician: Charlene Prabhakar DO     Insurance: [facility]  Medical Diagnosis: B hip joint OA, L Hamstring tendonitis    Rehab Codes: M16.0, [id number]  Onset date: 2020    Next Dr's appt. : 3/17/2021    Subjective:   CC:Patient reports a chief complaint of left > right anterior hip pain and a secondary complaint of left upper hamstring pain. He works as a  and works 10 hour shifts 5-6 days per week. He reports having some pain for the past year or more but it become much worse over the last 3 months. He saw Dr. Deann Montilla who prescribed Meloxicam which has made a significant difference with hip pain and his hamstring pain has been eliminated.   HPI: 2020    PMHx: [] Unremarkable [] Diabetes [x] HTN  [] Pacemaker   [] MI/Heart Problems [] Cancer [x] Arthritis [x] Other:Anxiety, smoking              [x] Refer to full medical chart  In EPIC       Comorbidities:   [] Obesity [] Dialysis  [] N/A   [] Asthma/COPD [] Dementia [x] Other: Anxiety   [] Stroke [] Sleep apnea [] Other:   [] Vascular disease [] Rheumatic disease [] Other:     Tests: [x] X-Ray: [] MRI:  [] Other:  History: Left hip pain   Findings: Low AP pelvis and frog-leg lateral x-ray of the left hip done in the office today shows severe degenerative changes with bone-on-bone gonarthrosis, superior femoral head cystic changes and cam lesion. No further evidence of fracture, subluxation, dislocation Radiopaque foreign body, radiopaque tumors noted. Impression: Severe degenerative changes left hip as described above. Narrative History: Right hip pain   Findings: Low AP pelvis frog-leg lateral x-ray of the right hip done in the office today shows significant cam lesion and moderate joint space narrowing. No further evidence of fracture, subluxation, dislocation, radiopaque foreign body, radiopaque tumors noted. Impression: Moderate degenerative changes right hip with cam lesion as described above. Medications: [x] Refer to full medical record [] None [] Other:  Allergies:      [] Refer to full medical record [] None [] Other:    Function:  Hand Dominance  [x] Right  [] Left  Patient lives with: In what type of home []  One story   [] Two story   [] Split level   Number of stairs to enter    With handrail on the []  Right to enter   [] Left to enter   Bathroom has a []  Tub only  [] Tub/shower combo   [] Walk in shower    []  Grab bars   Washing machine is on []  Main level   [] Second level   [] Basement   Employer [employer]   Job Status []  Normal duty   [x] Light duty   [] Off due to condition    []  Retired   [] Not employed   [] Disability  [] Other:  []  Return to work: Work activities/duties Walking, stairs         Gait Prior level of function Current level of function    [] Independent  [] Assist [x] Independent  [] Assist   Device: [] Independent [x] Independent    [] Straight Cane [] Quad cane [] Straight Cane [] Quad cane    [] Standard walker [] Rolling walker   [] 4 wheeled walker [] Standard walker [] Rolling walker   [] 4 wheeled walker    [] Wheelchair [] Wheelchair     Limited with squatting, stairs, bending, lifting and heavy house and yard work.     Pain:  [x] Yes [] No Location: L greater than right hip Pain Rating: (0-10 scale) 5/10    Symptoms:  [] Improving [] Worsening [x] Same  Better:  [] AM    [x] PM    [] Sit    [] Rise/Sit    []Stand    [] Walk    [] Lying    [] Other:  Worse: [x] AM    [] PM    [] Sit    [x] Rise/Sit    [x]Stand    [x] Walk    [] Lying    [x] Bend                      [] Valsalva    [] Other:  Sleep: [] OK    [x] Disturbed    Objective:    ROM  ° A/P STRENGTH TESTS (+/-) Left Right Not Tested    Left Right Left Right Ant. Drawer   [x]   Hip Flex 110 110 5 5 Post. Drawer   [x]   Ext 10 10 4+ 4+ Lachmans   [x]   ER 30 35   Valgus Stress   [x]   IR 20 25   Varus Stress   [x]   ABD   4+ 4+ Yannas   [x]   ADD     Apleys Comp.    [x]   Knee Flex     Apleys Dist.   [x]   Ext     Hip Scouring   [x]   Ankle DF     TIFFANYs   [x]   PF     Piriformis   [x]   INV     Asads   [x]   EVER     Talor Tilt   [x]        Pat-Fem Grind   [x]       OBSERVATION No Deficit Deficit Not Tested Comments   Posture       Forward Head [] [] []    Rounded Shoulders [] [] []    Kyphosis [] [] []    Lordosis [] [] []    Lateral Shift [] [] []    Scoliosis [] [] []    Iliac Crest [] [] []    PSIS [] [] []    ASIS [] [] []    Genu Valgus [] [] []    Genu Varus [] [] []    Genu Recurvatum [] [] []    Pronation [] [] []    Supination [] [] []    Leg Length Discrp [] [] []    Slumped Sitting [] [] []    Palpation [] [x] [] TTP: L>R TFL, gluteus medius, proximal hamstring   Sensation [x] [] []    Edema [x] [] []    Neurological [x] [] []    Patellar Mobility [] [] []    Patellar Orientation [] [] []    Gait [x] [] [] Analysis: WNL       FUNCTION Normal Difficult Unable   Sitting [x] [] []   Standing [] [x] []   Ambulation [] [x] []   Groom/Dress [x] [] []   Lift/Carry [] [x] []   Stairs [] [x] []   Bending [] [x] []   Squat [] [x] []   Kneel [] [x] []     BALANCE/PROPRIOCEPTION              [x] Not tested   Single leg stance       R                     L PAIN   Eyes open                             Sec. Sec                  . []    Eyes closed                          Sec. Sec                  . []      Functional Test: LEFS Score: 14% functionally impaired  69/80     Assessment:   The patient is a 44year old with a chief complaint of left greater than right hip pain and signs and symptoms consistent with a diagnosis of bilateral hip OA. He presents with pain, weakness, decreased ROM and funcitonal limitations. He is a good PT candidate to address above mentioned deficits    Problems:    [x] ? Pain:  [x] ? ROM:  [x] ? Strength:  [x] ? Function:  [] Other:       STG: (to be met in 6 treatments)  1. ? Pain: 3/10  2. ? Function: ascend and descend stairs  3. Patient to be independent with home exercise program as demonstrated by performance with correct form without cues. LTG: (to be met in 12 treatments)  1. Patient be independent in final HEP. 2. Patient will work as a  without hours restriction. Patient goals:    Rehab Potential:  [x] Good  [] Fair  [] Poor   Suggested Professional Referral:  [x] No  [] Yes:  Barriers to Goal Achievement:  [x] No  [] Yes:  Domestic Concerns:  [x] No  [] Yes:    Pt. Education:  [x] Plans/Goals, Risks/Benefits discussed  [x] Home exercise program    Method of Education: [x] Verbal  [] Demo  [x] Written  Comprehension of Education:  [x] Verbalizes understanding. [x] Demonstrates understanding. [x] Needs Review. [] Demonstrates/verbalizes understanding of HEP/Ed previously given.     Treatment Plan:  [x] Therapeutic Exercise   93473  [] Iontophoresis: 4 mg/mL Dexamethasone Sodium Phosphate  mAmin  01978   [x] Therapeutic Activity  99821 [x] Vasopneumatic cold with compression  77902    [] Gait Training   34410 [] Ultrasound   32559   [x] Neuromuscular Re-education  48388 [] Electrical Stimulation Unattended  15262   [x] Manual Therapy  86284 [] Electrical Stimulation Attended  [id number]   [x] Instruction in HEP  [] Lumbar/Cervical Traction  [id number]   [] Aquatic Therapy   [id number] [x] Cold/hotpack    [] Massage   [id number]      [x] Dry Needling, 1 or 2 muscles  05801   [] Biofeedback, first 15 minutes   94487  [] Biofeedback, additional 15 minutes   13695 [x] Dry Needling, 3 or more muscles  26343       Frequency:  2 x/week for 12-14 visits    Todays Treatment:  Modalities:   Precautions:  Exercises: Patient education on diagnosis, prognosis, POC and home exercise program.  Exercise Reps/ Time Weight/ Level Comments   LTR 15     Single knee to chest 15     Clam 15 ea     Hip abduction 10 ea     Hip circles 10/10 ea     Side hip flexion  10 ea     Bridge 15     S/L bridge 10 ea                 Manual: Side lying MFR to left hip rotators and TFL    Specific Instructions for next treatment: Progress pain free hip ROM and strength. Evaluation Complexity:  History (Personal factors, comorbidities) [] 0 [x] 1-2 [] 3+   Exam (limitations, restrictions) [] 1-2 [x] 3 [] 4+   Clinical presentation (progression) [] Stable [x] Evolving  [] Unstable   Decision Making [] Low [x] Moderate [] High    [] Low Complexity [x] Moderate Complexity [] High Complexity       Treatment Charges: Mins Units   [x] Evaluation       []  Low       [x]  Moderate       []  High 20 1   []  Modalities     [x]  Ther Exercise 25 2   [x]  Manual Therapy 5    []  Ther Activities     []  Aquatics     []  Vasocompression     []  Other       TOTAL TREATMENT TIME: 50    Time in:1035  Time PUN:5208    Electronically signed by: Jeanine Black PT        Physician Signature:________________________________Date:__________________  By signing above or cosigning this note, I have reviewed this plan of care and certify a need for medically necessary rehabilitation services.      *PLEASE SIGN ABOVE AND FAX BACK ALL PAGES*

## 2021-03-09 ENCOUNTER — HOSPITAL ENCOUNTER (OUTPATIENT)
Dept: PHYSICAL THERAPY | Facility: CLINIC | Age: 51
Setting detail: THERAPIES SERIES
Discharge: HOME | End: 2021-03-09
Payer: COMMERCIAL

## 2021-03-09 PROCEDURE — 97110 THERAPEUTIC EXERCISES: CPT

## 2021-03-09 NOTE — FLOWSHEET NOTE
[] [address]  [address]  [phone]  F: [phone] [] [address]  [facility] 36   [address]  P: [phone]  F: [phone] [] [address] &  Therapy  [address]  P: [phone]  F: [phone] [x] [address]  P: [phone]  F: [phone] [] [address]  [facility]   [address]   [place]: [phone]  F: [phone]      Physical Therapy Daily Treatment Note    Date:  3/9/2021  Patient Name:  Viktor Laguerre    :  1970  MRN: [id number]  Physician: Tremaine Go DO                                    Insurance: [facility]  Medical Diagnosis: B hip joint OA, L Hamstring tendonitis               Rehab Codes: M16.0, [id number]  Onset date: 2020                       Next Dr's appt. : 3/17/2021    Visit# / total visits: 2     Cancels/No Shows: 0    Subjective:    Pain:  [] Yes  [x] No Location: N/A Pain Rating: (0-10 scale) 0/10  Comments:  Patient reports that he has been feeling much better since he came in last.  He has been biking and has had no pian most days.   He continues to use Meloxicam.    Objective:  Modalities:   Precautions:  Exercises: Patient education on diagnosis, prognosis, POC and home exercise program.  Exercise Reps/ Time Weight/ Level Comments   LTR 15       Single knee to chest 15       Clam 15 ea       Hip abduction 10 ea       Hip circles 10/10 ea       Side hip flexion  10 ea       Bridge 15       S/L bridge 10 ea       Bike ROM   7 min       PPT  15    Difficulty activating TA   PPT march 10     PPT with S/L bicycle 15     Ball flexion ROM 2x15     Ball Bridge 10     Ball SLR 10     Walking knee hug 10m     Walking cradle 10m     Cross over LTR 15     Kneeling hip flexor 30\"                       Manual:       Treatment Charges: Mins Units   []  Modalities     [x]  Ther Exercise 45 3   []  Manual Therapy     []  Ther Activities     []  Aquatics     []  Vasocompression     []  Other     Total Treatment time 45 3       Assessment: [x] Progressing toward goals. Patient had no complaints of pain, he has been able to work without pain. He has difficulty with core activation but had no pain with hip and core progression. [] No change. [] Other:  [x] Patient would continue to benefit from skilled physical therapy services in order to: meet original goals. STG/LTG:  STG: (to be met in 6 treatments)  1. ? Pain: 3/10  2. ? Function: ascend and descend stairs  3. Patient to be independent with home exercise program as demonstrated by performance with correct form without cues. LTG: (to be met in 12 treatments)  1. Patient be independent in final HEP. 2. Patient will work as a  without hours restriction. Pt. Education:  [x] Yes  [] No  [x] Reviewed Prior HEP/Ed  Method of Education: [x] Verbal  [] Demo  [] Written  Comprehension of Education:  [x] Verbalizes understanding. [] Demonstrates understanding. [x] Needs review. [x] Demonstrates/verbalizes HEP/Ed previously given. Plan: [x] Continue current frequency toward long and short term goals. [x] Specific Instructions for subsequent treatments: Progress hip strengthening, add closed chain exercises and band walks.       Time In:1000            Time Out: 1045    Electronically signed by:  Shanae Spivey, PT

## 2021-03-15 ENCOUNTER — HOSPITAL ENCOUNTER (OUTPATIENT)
Dept: PHYSICAL THERAPY | Facility: CLINIC | Age: 51
Setting detail: THERAPIES SERIES
Discharge: HOME | End: 2021-03-15
Payer: COMMERCIAL

## 2021-03-15 NOTE — FLOWSHEET NOTE
[] [address]  [address]    [phone]  F: [phone]   [] [address]  [facility]   [address]  P: [phone]  F: [phone]  [] [address]  Therapy  [address]  P: [phone]  F: [phone] [x] [address]  P: [phone]  F: [phone]  [] [address]  [address] [facility] [address]   [address]   [place]: [phone]  F: [phone]   [] [address]  [place]: [phone]   F: [phone]     Physical Therapy Cancel/No Show note    Date: 3/15/2021  Patient: Jordan Bender  : 1970  MRN: [id number]    Cancels/No Shows to date:     For today's appointment patient:    [x]  Cancelled    [] Rescheduled appointment    [] No-show     Reason given by patient:    []  Patient ill    []  Conflicting appointment    [] No transportation      [] Conflict with work    [] No reason given    [] Weather related    [] COVID-19    [x] Other:      Comments:  Pt called to cancel rest of scheduled visits, stating that he thinks he can perform his exercises on his own and that he has a better prognosis than he initially thought.        [] Next appointment was confirmed    Electronically signed by: Sangeeta Alejo PTA

## 2021-03-17 ENCOUNTER — OFFICE VISIT (OUTPATIENT)
Dept: ORTHOPEDIC SURGERY | Age: 51
End: 2021-03-17
Payer: COMMERCIAL

## 2021-03-17 ENCOUNTER — APPOINTMENT (OUTPATIENT)
Dept: PHYSICAL THERAPY | Facility: CLINIC | Age: 51
End: 2021-03-17
Payer: COMMERCIAL

## 2021-03-17 VITALS — WEIGHT: 170 LBS | HEIGHT: 69 IN | BODY MASS INDEX: 25.18 KG/M2

## 2021-03-17 DIAGNOSIS — M16.0 BILATERAL HIP JOINT ARTHRITIS: Primary | ICD-10-CM

## 2021-03-17 PROCEDURE — 99213 OFFICE O/P EST LOW 20 MIN: CPT | Performed by: ORTHOPAEDIC SURGERY

## 2021-03-17 NOTE — PROGRESS NOTES
lesions  Psych: Patient displays understanding of exam, diagnosis, and plan. Radiology:   No new imaging obtained in office today. Assessment:      1. Bilateral hip joint arthritis         Plan: We reviewed the patient's x-ray imaging in the office today. We again discussed the natural course and etiology of bilateral hip osteoarthritis. We discussed the treatment options which include conservative management options such as physical therapy, anti-inflammatory medication use, activity modification, and bracing and as a last line of treatment surgical treatment option. We discussed the patient at length that since he is doing very well in regards to his hip pain we will continue conservative management is appropriate. We had a lengthy discussion that ultimately in the future if he ever worsened in regards to his hip pain we could discuss other avenues of treatment. In the meantime he will continue with Mobic for pain control as well as 10-hour workday restrictions. He may follow-up in our office as needed. Follow up:Return if symptoms worsen or fail to improve. No orders of the defined types were placed in this encounter. No orders of the defined types were placed in this encounter.       Charlene Duncan DO  Orthopedic Surgery Resident, PGY-2  [facility]

## 2021-03-22 ENCOUNTER — APPOINTMENT (OUTPATIENT)
Dept: PHYSICAL THERAPY | Facility: CLINIC | Age: 51
End: 2021-03-22
Payer: COMMERCIAL

## 2021-03-25 ENCOUNTER — APPOINTMENT (OUTPATIENT)
Dept: PHYSICAL THERAPY | Facility: CLINIC | Age: 51
End: 2021-03-25
Payer: COMMERCIAL

## 2021-05-26 ENCOUNTER — OFFICE VISIT (OUTPATIENT)
Dept: FAMILY MEDICINE CLINIC | Age: 51
End: 2021-05-26
Payer: COMMERCIAL

## 2021-05-26 VITALS
DIASTOLIC BLOOD PRESSURE: 96 MMHG | SYSTOLIC BLOOD PRESSURE: 144 MMHG | TEMPERATURE: 98.1 F | OXYGEN SATURATION: 97 % | WEIGHT: 175.6 LBS | HEIGHT: 69 IN | HEART RATE: 95 BPM | BODY MASS INDEX: 26.01 KG/M2

## 2021-05-26 DIAGNOSIS — I10 ESSENTIAL HYPERTENSION: ICD-10-CM

## 2021-05-26 DIAGNOSIS — F41.9 ANXIETY AND DEPRESSION: Primary | ICD-10-CM

## 2021-05-26 DIAGNOSIS — F32.A ANXIETY AND DEPRESSION: Primary | ICD-10-CM

## 2021-05-26 PROCEDURE — 99213 OFFICE O/P EST LOW 20 MIN: CPT | Performed by: NURSE PRACTITIONER

## 2021-05-26 RX ORDER — HYDRALAZINE HYDROCHLORIDE 25 MG/1
25 TABLET, FILM COATED ORAL 2 TIMES DAILY
Qty: 60 TABLET | Refills: 3 | Status: SHIPPED | OUTPATIENT
Start: 2021-05-26 | End: 2021-06-22 | Stop reason: SDUPTHER

## 2021-05-26 RX ORDER — TRIAMTERENE AND HYDROCHLOROTHIAZIDE 37.5; 25 MG/1; MG/1
1 TABLET ORAL DAILY
Qty: 30 TABLET | Refills: 3 | Status: SHIPPED | OUTPATIENT
Start: 2021-05-26 | End: 2021-06-22 | Stop reason: SDUPTHER

## 2021-05-26 RX ORDER — CITALOPRAM 10 MG/1
10 TABLET ORAL DAILY
Qty: 30 TABLET | Refills: 0 | Status: SHIPPED | OUTPATIENT
Start: 2021-05-26 | End: 2021-06-22 | Stop reason: SDUPTHER

## 2021-05-26 RX ORDER — DILTIAZEM HYDROCHLORIDE 240 MG/1
240 CAPSULE, EXTENDED RELEASE ORAL DAILY
Qty: 30 CAPSULE | Refills: 3 | Status: SHIPPED | OUTPATIENT
Start: 2021-05-26 | End: 2021-06-22 | Stop reason: SDUPTHER

## 2021-05-26 ASSESSMENT — ENCOUNTER SYMPTOMS
COLOR CHANGE: 0
CHEST TIGHTNESS: 0
SHORTNESS OF BREATH: 0
COUGH: 0
ABDOMINAL PAIN: 0

## 2021-05-26 ASSESSMENT — PATIENT HEALTH QUESTIONNAIRE - PHQ9
SUM OF ALL RESPONSES TO PHQ QUESTIONS 1-9: 2
1. LITTLE INTEREST OR PLEASURE IN DOING THINGS: 1
SUM OF ALL RESPONSES TO PHQ9 QUESTIONS 1 & 2: 2

## 2021-05-26 NOTE — PROGRESS NOTES
Visit Information    Have you changed or started any medications since your last visit including any over-the-counter medicines, vitamins, or herbal medicines? no   Have you stopped taking any of your medications? Is so, why? -  no  Are you having any side effects from any of your medications? - no    Have you seen any other physician or provider since your last visit?  no   Have you had any other diagnostic tests since your last visit?  no   Have you been seen in the emergency room and/or had an admission in a hospital since we last saw you?  no   Have you had your routine dental cleaning in the past 6 months?  no     Do you have an active MyChart account? If no, what is the barrier?   Yes    Patient Care Team:  ESTELA Reynolds CNP as PCP - General (Family Nurse Practitioner)  ESTELA Reynolds CNP as PCP - [facility] Provider    Medical History Review  Past Medical, Family, and Social History reviewed and  contribute to the patient presenting condition    Health Maintenance   Topic Date Due    COVID-19 Vaccine (1) Never done    Shingles Vaccine (1 of 2) Never done    Pneumococcal 0-64 years Vaccine (1 of 2 - PPSV23) 10/13/2021 (Originally 7/23/1976)    Flu vaccine (Season Ended) 01/27/2022 (Originally 9/1/2021)    Potassium monitoring  02/03/2022    Creatinine monitoring  02/03/2022    Colon cancer screen fecal DNA test (Cologuard)  02/25/2023    Lipid screen  02/03/2026    DTaP/Tdap/Td vaccine (2 - Td) 01/27/2031    Hepatitis C screen  Completed    HIV screen  Completed    Hepatitis A vaccine  Aged Out    Hepatitis B vaccine  Aged Out    Hib vaccine  Aged Out    Meningococcal (ACWY) vaccine  Aged Out

## 2021-05-26 NOTE — PROGRESS NOTES
[facility] [place]  [address]  [place], [address]  [phone]      Brandie Dodson is a 44 y.o. male who presents today for his  medicalconditions/complaints as noted below. Brandie Dodson is c/o of Arthritis (left hip,has some questions,saw Dr Jetty Councilman), Depression (anxiety,new issue,going on for the past couple months,had 1 wk he called off work 4 times,seems to come/go,did talk to a counselor), and Hypertension (monitoring BP at home and it has been fine so he stopped taking meds about 4 months ago)  . HPI:    Hypertension  Pertinent negatives include no chest pain, headaches, palpitations or shortness of breath. Pt presents to office today regarding a few issues:    Worsened anxiety and depression:  . He denies any major triggers or life changes recently that he felt has led to this. He did recently go see Dr. Jetty Councilman, who told him he had significant arthritic changes in bilateral hips, and would most likely need replacements in the future. Pt stated this did make him feel depressed for a few days as he walks for work daily, but he doesn't feel this is the root issue of the anxiety or depression. Pt feels as though he has \"highs\" and \"lows\" and stated he had a very low week a few weeks ago where he called off work 4/5 days because he did not want to do anything at all. He is interested in starting an antidepressant to help control his moods. he did see a counselor and they advised meds. High blood pressure:    Pt was prescribed hydralazine, maxzide, and diltiazem in the past, but stopped taking them 4 months ago when his BP reading had been controlled at home. He stated he then became very relaxed and wasn't checking his BP at home, as he felt it was under control when he stopped his medications.  He realized that his BP is now high again, and is willing to restart his previous medications as he did not have any issues with them in the past.   Past Medical History:   Diagnosis Date    Tobacco abuse       History reviewed. No pertinent surgical history. Family History   Problem Relation Age of Onset    Cancer Mother         lung, cancer    No Known Problems Father     No Known Problems Sister     Hypertension Paternal Grandfather      Social History     Tobacco Use    Smoking status: Light Tobacco Smoker     Packs/day: 0.25     Types: Cigarettes    Smokeless tobacco: Former User    Tobacco comment: smokes occasionally   Substance Use Topics    Alcohol use: Not Currently      Current Outpatient Medications   Medication Sig Dispense Refill    citalopram (CELEXA) 10 MG tablet Take 1 tablet by mouth daily 30 tablet 0    hydrALAZINE (APRESOLINE) 25 MG tablet Take 1 tablet by mouth 2 times daily 60 tablet 3    triamterene-hydroCHLOROthiazide (MAXZIDE-25) 37.5-25 MG per tablet Take 1 tablet by mouth daily 30 tablet 3    dilTIAZem (DILACOR XR) 240 MG extended release capsule Take 1 capsule by mouth daily 30 capsule 3    meloxicam (MOBIC) 15 MG tablet Take 1 tablet by mouth daily 30 tablet 3     No current facility-administered medications for this visit.      No Known Allergies    Health Maintenance   Topic Date Due    COVID-19 Vaccine (1) Never done    Shingles Vaccine (1 of 2) Never done    Pneumococcal 0-64 years Vaccine (1 of 2 - PPSV23) 10/13/2021 (Originally 7/23/1976)    Flu vaccine (Season Ended) 01/27/2022 (Originally 9/1/2021)    Potassium monitoring  02/03/2022    Creatinine monitoring  02/03/2022    Colon cancer screen fecal DNA test (Cologuard)  02/25/2023    Lipid screen  02/03/2026    DTaP/Tdap/Td vaccine (2 - Td) 01/27/2031    Hepatitis C screen  Completed    HIV screen  Completed    Hepatitis A vaccine  Aged Out    Hepatitis B vaccine  Aged Out    Hib vaccine  Aged Out    Meningococcal (ACWY) vaccine  Aged Out       Subjective:      Review of Systems   Constitutional: Negative for activity change, appetite change, chills, diaphoresis, fatigue, fever and unexpected weight change. Respiratory: Negative for cough, chest tightness and shortness of breath. Cardiovascular: Negative for chest pain, palpitations and leg swelling. Gastrointestinal: Negative for abdominal pain. Musculoskeletal: Positive for arthralgias (bilateral hip pain ). Skin: Negative for color change. Neurological: Negative for dizziness, facial asymmetry and headaches. Hematological: Negative for adenopathy. Psychiatric/Behavioral: Negative for agitation, behavioral problems, confusion, decreased concentration, dysphoric mood, hallucinations, self-injury, sleep disturbance and suicidal ideas. The patient is nervous/anxious. The patient is not hyperactive. Objective:      Physical Exam  Vitals and nursing note reviewed. Constitutional:       General: He is not in acute distress. Appearance: Normal appearance. He is well-developed and normal weight. He is not ill-appearing or diaphoretic. HENT:      Head: Normocephalic and atraumatic. Nose: Nose normal.      Mouth/Throat:      Mouth: Mucous membranes are moist.   Eyes:      Conjunctiva/sclera: Conjunctivae normal.      Pupils: Pupils are equal, round, and reactive to light. Cardiovascular:      Rate and Rhythm: Normal rate and regular rhythm. Pulses: Normal pulses. Heart sounds: Normal heart sounds. Pulmonary:      Effort: Pulmonary effort is normal.      Breath sounds: Normal breath sounds. Musculoskeletal:      Cervical back: Normal range of motion and neck supple. Skin:     General: Skin is warm and dry. Capillary Refill: Capillary refill takes less than 2 seconds. Neurological:      General: No focal deficit present. Mental Status: He is alert and oriented to person, place, and time. Mental status is at baseline. Psychiatric:         Mood and Affect: Mood normal.         Behavior: Behavior normal.         Thought Content:  Thought content normal.         Judgment: Judgment

## 2021-06-02 ENCOUNTER — PATIENT MESSAGE (OUTPATIENT)
Dept: FAMILY MEDICINE CLINIC | Age: 51
End: 2021-06-02

## 2021-06-02 NOTE — TELEPHONE ENCOUNTER
From: Papo Goode  To: ESTELA Weber - CNP  Sent: 6/2/2021 10:16 AM EDT  Subject: Visit Follow-Up Question    Frank Garcia. Blood pressure readings since visit are. ..138/112, 135/110, 139/115, 130/100, 130/95, 128/94. Citalopram prescription seems fine, no noticeable side effects. I would also appreciate a letter for the medical marijuana doctor so I can include with my paperwork for the application. I can forward everything from [address] [place] [address] [address]. Hoping you may include arthritis, chronic pain and that my condition will not go away. Let me know if u have any questions and thank you again. C u next time.

## 2021-06-22 DIAGNOSIS — M76.892 HAMSTRING TENDONITIS OF LEFT THIGH: ICD-10-CM

## 2021-06-22 DIAGNOSIS — M16.0 BILATERAL HIP JOINT ARTHRITIS: ICD-10-CM

## 2021-06-24 RX ORDER — MELOXICAM 15 MG/1
15 TABLET ORAL DAILY
Qty: 30 TABLET | Refills: 3 | Status: SHIPPED | OUTPATIENT
Start: 2021-06-24 | End: 2021-10-27 | Stop reason: SDUPTHER

## 2021-12-15 NOTE — PROGRESS NOTES
Visit Information    Have you changed or started any medications since your last visit including any over-the-counter medicines, vitamins, or herbal medicines? no   Have you stopped taking any of your medications? Is so, why? -  no  Are you having any side effects from any of your medications? - no    Have you seen any other physician or provider since your last visit?  no   Have you had any other diagnostic tests since your last visit?  no   Have you been seen in the emergency room and/or had an admission in a hospital since we last saw you?  no   Have you had your routine dental cleaning in the past 6 months?  no     Do you have an active MyChart account? If no, what is the barrier?   Yes    Patient Care Team:  ESTELA James CNP as PCP - General (Family Nurse Practitioner)  ESTELA James CNP as PCP - Gabrielle Childers Provider    Medical History Review  Past Medical, Family, and Social History reviewed and  contribute to the patient presenting condition    Health Maintenance   Topic Date Due    Shingles Vaccine (1 of 2) 07/23/2018    DTaP/Tdap/Td vaccine (1 - Tdap) 01/20/2021 (Originally 7/23/1987)    Pneumococcal 0-64 years Vaccine (1 of 1 - PPSV23) 10/13/2021 (Originally [birth year]/23/1974)    Flu vaccine (1) 09/01/2020    Lipid screen  12/19/2021    Colon cancer screen fecal DNA test (Cologuard)  02/25/2023    HIV screen  Completed    Hepatitis A vaccine  Aged Out    Hepatitis B vaccine  Aged Out    Hib vaccine  Aged Out    Meningococcal (ACWY) vaccine  Aged Out
Virtual Visit (video visit) encounter to address concerns as mentioned above. A caregiver was present when appropriate. Due to this being a TeleHealth encounter (During UUZNP-34 public health emergency), evaluation of the following organ systems was limited: Vitals/Constitutional/EENT/Resp/CV/GI//MS/Neuro/Skin/Heme-Lymph-Imm. Pursuant to the emergency declaration under the [address] [place] [address] and the [employer] Act, this Virtual Visit was conducted with patient's (and/or legal guardian's) consent, to reduce the patient's risk of exposure to COVID-19 and provide necessary medical care. The patient (and/or legal guardian) has also been advised to contact this office for worsening conditions or problems, and seek emergency medical treatment and/or call 911 if deemed necessary. Patient identification was verified at the start of the visit: Yes    Total time spent on this encounter: Not billed by time    Services were provided through a video synchronous discussion virtually to substitute for in-person clinic visit. Patient and provider were located at their individual homes. --ESTELA Napoles CNP on 7/7/2020 at 10:51 AM    An electronic signature was used to authenticate this note.
See HPI

## 2022-01-05 ENCOUNTER — OFFICE VISIT (OUTPATIENT)
Dept: FAMILY MEDICINE CLINIC | Age: 52
End: 2022-01-05
Payer: COMMERCIAL

## 2022-01-05 VITALS
BODY MASS INDEX: 25.51 KG/M2 | DIASTOLIC BLOOD PRESSURE: 72 MMHG | WEIGHT: 172.2 LBS | TEMPERATURE: 98 F | HEART RATE: 72 BPM | SYSTOLIC BLOOD PRESSURE: 118 MMHG | HEIGHT: 69 IN | OXYGEN SATURATION: 97 %

## 2022-01-05 DIAGNOSIS — Z23 FLU VACCINE NEED: ICD-10-CM

## 2022-01-05 DIAGNOSIS — I10 ESSENTIAL HYPERTENSION: Primary | ICD-10-CM

## 2022-01-05 DIAGNOSIS — F45.8 BRUXISM: ICD-10-CM

## 2022-01-05 PROCEDURE — 90471 IMMUNIZATION ADMIN: CPT | Performed by: NURSE PRACTITIONER

## 2022-01-05 PROCEDURE — 90674 CCIIV4 VAC NO PRSV 0.5 ML IM: CPT | Performed by: NURSE PRACTITIONER

## 2022-01-05 PROCEDURE — 99213 OFFICE O/P EST LOW 20 MIN: CPT | Performed by: NURSE PRACTITIONER

## 2022-01-05 RX ORDER — METHOCARBAMOL 500 MG/1
500 TABLET, FILM COATED ORAL 3 TIMES DAILY PRN
Qty: 30 TABLET | Refills: 0 | Status: SHIPPED | OUTPATIENT
Start: 2022-01-05 | End: 2022-02-27 | Stop reason: SDUPTHER

## 2022-01-05 ASSESSMENT — ENCOUNTER SYMPTOMS
CHEST TIGHTNESS: 0
SORE THROAT: 0
TROUBLE SWALLOWING: 0
SHORTNESS OF BREATH: 0
SINUS PAIN: 0
NAUSEA: 0
COUGH: 0
SINUS PRESSURE: 0
VOMITING: 0
ABDOMINAL PAIN: 0
RHINORRHEA: 0

## 2022-01-05 NOTE — PROGRESS NOTES
Visit Information    Have you changed or started any medications since your last visit including any over-the-counter medicines, vitamins, or herbal medicines? no   Have you stopped taking any of your medications? Is so, why? -  no  Are you having any side effects from any of your medications? - no    Have you seen any other physician or provider since your last visit?  no   Have you had any other diagnostic tests since your last visit?  no   Have you been seen in the emergency room and/or had an admission in a hospital since we last saw you?  no   Have you had your routine dental cleaning in the past 6 months?  no     Do you have an active MyChart account? If no, what is the barrier? Yes    Patient Care Team:  ESTELA Ochoa CNP as PCP - General (Family Nurse Practitioner)  ESTELA Ochoa CNP as PCP - [facility]    Medical History Review  Past Medical, Family, and Social History reviewed and  contribute to the patient presenting condition    Health Maintenance   Topic Date Due    Pneumococcal 0-64 years Vaccine (1 of 2 - PPSV23) Never done    Shingles Vaccine (1 of 2) Never done    Flu vaccine (1) Never done    Potassium monitoring  02/03/2022    Creatinine monitoring  02/03/2022    Depression Monitoring  05/26/2022    Colon cancer screen fecal DNA test (Cologuard)  02/25/2023    Lipid screen  02/03/2026    DTaP/Tdap/Td vaccine (2 - Td or Tdap) 01/27/2031    COVID-19 Vaccine  Completed    Hepatitis C screen  Completed    HIV screen  Completed    Hepatitis A vaccine  Aged Out    Hepatitis B vaccine  Aged Out    Hib vaccine  Aged Out    Meningococcal (ACWY) vaccine  Aged Out       After obtaining consent, and per orders of Asad Fishman CNP, injection of flu given in Left deltoid by Anita Iverson MA. Patient instructed to remain in clinic for 20 minutes afterwards, and to report any adverse reaction to me immediately.     Vaccine Information Sheet, \"Influenza - Inactivated\" given to Cameron Acosta, or parent/legal guardian of  Cameron Acosta and verbalized understanding. Patient responses:    Have you ever had a reaction to a flu vaccine? First time getting  Are you able to eat eggs without adverse effects? Yes  Do you have any current illness? No  Have you ever had Guillian Letcher Syndrome? No    Flu vaccine given per order. Please see immunization tab.

## 2022-01-05 NOTE — PROGRESS NOTES
[facility] [place]  [address]  [place], [address]  [phone]      Amanda Okeefe is a 46 y.o. male who presents today for his  medicalconditions/complaints as noted below. Amanda Okeefe is c/o of Jaw Pain (clenching teeth,started yrs ago just at night,now noticing during day clencing teeth,not stressed at job) and Depression (weaned off celexa,didnt feel he needed any more)  . HPI:    HPI  Patient here today for evaluation of bruxism. States this is an ongoing problem and has been to his dentist several times. He has a  and , but sometimes will nap during day and forgets to put it in and will chip his teeth. Next step is to have them removed and dentures out in. He would like to try something else first. He states he has no current anxiety or stress. Stable on BP meds with no SE>   Past Medical History:   Diagnosis Date    Tobacco abuse       History reviewed. No pertinent surgical history.   Family History   Problem Relation Age of Onset    Cancer Mother         lung, cancer    No Known Problems Father     No Known Problems Sister     Hypertension Paternal Grandfather      Social History     Tobacco Use    Smoking status: Light Tobacco Smoker     Packs/day: 0.25     Types: Cigarettes    Smokeless tobacco: Former User    Tobacco comment: smokes occasionally   Substance Use Topics    Alcohol use: Not Currently      Current Outpatient Medications   Medication Sig Dispense Refill    methocarbamol (ROBAXIN) 500 MG tablet Take 1 tablet by mouth 3 times daily as needed (bruxism) 30 tablet 0    meloxicam (MOBIC) 15 MG tablet Take 1 tablet by mouth daily 30 tablet 3    hydrALAZINE (APRESOLINE) 25 MG tablet Take 1 tablet by mouth 2 times daily 60 tablet 5    triamterene-hydroCHLOROthiazide (MAXZIDE-25) 37.5-25 MG per tablet Take 1 tablet by mouth daily 30 tablet 5    dilTIAZem (DILACOR XR) 240 MG extended release capsule Take 1 capsule by mouth daily 30 capsule 5     No current facility-administered medications for this visit. No Known Allergies    Health Maintenance   Topic Date Due    Pneumococcal 0-64 years Vaccine (1 of 2 - PPSV23) Never done    Shingles Vaccine (1 of 2) Never done    Potassium monitoring  02/03/2022    Creatinine monitoring  02/03/2022    Depression Monitoring  05/26/2022    Colon cancer screen fecal DNA test (Cologuard)  02/25/2023    Lipid screen  02/03/2026    DTaP/Tdap/Td vaccine (2 - Td or Tdap) 01/27/2031    Flu vaccine  Completed    COVID-19 Vaccine  Completed    Hepatitis C screen  Completed    HIV screen  Completed    Hepatitis A vaccine  Aged Out    Hepatitis B vaccine  Aged Out    Hib vaccine  Aged Out    Meningococcal (ACWY) vaccine  Aged Out       Subjective:      Review of Systems   Constitutional: Negative for appetite change, chills, diaphoresis, fatigue and fever. HENT: Negative for postnasal drip, rhinorrhea, sinus pressure, sinus pain, sneezing, sore throat and trouble swallowing. Eyes: Negative for visual disturbance. Respiratory: Negative for cough, chest tightness and shortness of breath. Cardiovascular: Negative for chest pain, palpitations and leg swelling. Gastrointestinal: Negative for abdominal pain, nausea and vomiting. Skin: Negative for rash. Neurological: Negative for dizziness, weakness and headaches. Hematological: Negative for adenopathy. Psychiatric/Behavioral: Negative for sleep disturbance. The patient is not nervous/anxious. Objective:      Physical Exam  Vitals and nursing note reviewed. Constitutional:       Appearance: Normal appearance. He is not ill-appearing or diaphoretic. HENT:      Head: Normocephalic and atraumatic. Mouth/Throat:      Lips: Pink. Mouth: Mucous membranes are moist.      Dentition: Abnormal dentition (from bruxism). Pharynx: Oropharynx is clear.    Eyes:      Conjunctiva/sclera: Conjunctivae normal.   Pulmonary: Effort: Pulmonary effort is normal.   Musculoskeletal:      Cervical back: Neck supple. Skin:     General: Skin is warm and dry. Neurological:      General: No focal deficit present. Mental Status: He is alert and oriented to person, place, and time. Mental status is at baseline. Psychiatric:         Mood and Affect: Mood normal.         Behavior: Behavior normal.         Thought Content: Thought content normal.         Judgment: Judgment normal.         Assessment:       Diagnosis Orders   1. Essential hypertension     2. Bruxism  methocarbamol (ROBAXIN) 500 MG tablet   3. Flu vaccine need  INFLUENZA, MDCK QUADV, 2 YRS AND OLDER, IM, PF, PREFILL SYR OR SDV, 0.5ML (FLUCELVAX QUADV, PF)     Wt Readings from Last 3 Encounters:   01/05/22 172 lb 3.2 oz (78.1 kg)   05/26/21 175 lb 9.6 oz (79.7 kg)   03/17/21 170 lb (77.1 kg)     BP Readings from Last 3 Encounters:   01/05/22 118/72   05/26/21 (!) 144/96   01/27/21 118/78       Plan:      Return if symptoms worsen or fail to improve. Orders Placed This Encounter   Procedures    INFLUENZA, MDCK QUADV, 2 YRS AND OLDER, IM, PF, PREFILL SYR OR SDV, 0.5ML (FLUCELVAX QUADV, PF)     Orders Placed This Encounter   Medications    methocarbamol (ROBAXIN) 500 MG tablet     Sig: Take 1 tablet by mouth 3 times daily as needed (bruxism)     Dispense:  30 tablet     Refill:  0   Health Maintenance reviewed - Flu shot given. HTN controlled  Continue same meds at same doses    Bruxism:  Trial low dose robaxin, no effects before driving or working  Continue with dentist and night/ for mouth      Patient given educational materials - see patient instructions. Discussed use,benefit, and side effects of prescribed medications. All patient questions answered. Pt voiced understanding. Reviewed health maintenance. Instructed to continue currentmedications, diet and exercise.     Electronically signed by ESTELA Nelson CNP, CNP on 1/5/2022 at 12:21 PM

## 2022-02-16 ENCOUNTER — OFFICE VISIT (OUTPATIENT)
Dept: ORTHOPEDIC SURGERY | Age: 52
End: 2022-02-16
Payer: COMMERCIAL

## 2022-02-16 VITALS — RESPIRATION RATE: 15 BRPM | WEIGHT: 172 LBS | HEIGHT: 69 IN | BODY MASS INDEX: 25.48 KG/M2

## 2022-02-16 DIAGNOSIS — M16.12 ARTHRITIS OF LEFT HIP: Primary | ICD-10-CM

## 2022-02-16 DIAGNOSIS — M16.11 ARTHRITIS OF RIGHT HIP: ICD-10-CM

## 2022-02-16 PROCEDURE — 99213 OFFICE O/P EST LOW 20 MIN: CPT | Performed by: ORTHOPAEDIC SURGERY

## 2022-02-16 ASSESSMENT — ENCOUNTER SYMPTOMS
NAUSEA: 0
DIARRHEA: 0
COUGH: 0
CONSTIPATION: 0

## 2022-02-16 NOTE — LETTER
[facility]  [address] [facility] [address]  Phone: [phone]  Fax: 049 Nav Villalobos DO        February 16, 2022     Patient: Rubin Metcalf   YOB: 1970   Date of Visit: 2/16/2022       To Whom it May Concern:    Rubin Metcalf was seen in my clinic on 2/16/2022. He may return to work with the following restrictions; limit active walking to no more than 4 hours per shift and no working more than 10 hours per shift. If you have any questions or concerns, please don't hesitate to call.     Sincerely,         Vikas Melton, DO

## 2022-02-16 NOTE — PROGRESS NOTES
[facility]  [address] [facility] [place] [address]  [address] [address]  Dept: [phone]  Dept Fax: [phone]        Ambulatory Follow Up      Subjective:   Abel Horn is a 46y.o. year old male who presents to our office today for routine followup regarding his   1. Arthritis of left hip    . Chief Complaint   Patient presents with    Follow-up     left hip pain       HPI- Patient presents today for follow up for his left hip. Patient was last seen on 3/17/21 and underwent treatment in the form of home exercise program and mobic. Patient says he will have his good and bad days. Patient says some time he will go months without pain and then some time will have flare ups that last two weeks. He knows he will need to get a hip replacement but he is able to do all activities and work right now. He said he is fine working his 10 hour shifts but would like a restriction of no walking more than 4 hours per shift. Review of Systems   Constitutional: Negative for chills and fever. Respiratory: Negative for cough. Gastrointestinal: Negative for constipation, diarrhea and nausea. Musculoskeletal: Positive for arthralgias (left hip). Negative for gait problem, joint swelling and myalgias. Neurological: Negative for dizziness, weakness and numbness. I have reviewed the CC, HPI, ROS, PMH, FHX, Social History, and if not present in this note, I have reviewed in the patient's chart. I agree with the documentation provided by other staff and have reviewed their documentation prior to providing my signature indicating agreement. Objective :   Resp 15   Ht 5' 9\" (1.753 m)   Wt 172 lb (78 kg)   BMI 25.40 kg/m²  Body mass index is 25.4 kg/m². General: Abel Horn is a 46 y.o. male who is alert and oriented and sitting comfortably in our office.   Ortho Exam  MS:  Patient ambulates with mild shortening weightbearing phase and antalgic gait to the Left lower extremity. There is no erythema, warmth, skin lesions, signs of infection. Positive hip logroll and Stinchfield test is noted. Patient has full range of motion of the Left knee and ankle. Motor, sensory, and vascular examination to the Left lower extremity is intact without focal deficits. Patient has full range of motion of the lumbosacral spine. Neuro: alert and oriented to person and place. Eyes: Extra-ocular muscles intact  Mouth: Oral mucosa moist. No perioral lesions  Pulm: Respirations unlabored and regular. Symmetric chest excursion without outward deformity is noted. Skin: warm, well perfused  Psych:   Patient has good fund of knowledge and displays understanging of exam, diagnosis, and plan. Radiology:     XR HIP RIGHT (1 VIEW)    Result Date: 2/16/2022  History:   Right hip pain Findings:   Low AP pelvis, AP Right hip, frog leg lateral xrays Right hip xrays done in the office today shows moderate joint space narrowing, osteophytosis, joint line sclerosis to the Right hip. No evidence of fracture, subluxation, dislocation, radioopaque foreign body/tumor is noted. Impression:   moderate degenerative changes Right hip as described above. XR HIP LEFT (2-3 VIEWS)    Result Date: 2/16/2022  History:   Left hip pain Findings:   Low AP pelvis, AP Left hip, frog leg lateral xrays Left hip xrays done in the office today shows severe joint space narrowing, osteophytosis, joint line sclerosis to the Left hip. No evidence of fracture, subluxation, dislocation, radioopaque foreign body/tumor is noted. Impression:   severe degenerative changes Left hip as described above. Assessment:      1. Arthritis of left hip       Plan:      Discussed etiology and natural history of left hip arthritis.   The treatment options may include oral anti-inflammatories, bracing, injections, advanced imaging, activity modification, physical therapy and/or surgical intervention. The patient would like to proceed with mobic, HEP and work limitations. The patient will follow up as needed. We discussed that the patient should call us with any concerns or questions. Follow up:Return if symptoms worsen or fail to improve. No orders of the defined types were placed in this encounter. Orders Placed This Encounter   Procedures    XR HIP LEFT (2-3 VIEWS)     Standing Status:   Future     Number of Occurrences:   1     Standing Expiration Date:   2/14/2023    XR HIP RIGHT (1 VIEW)     Standing Status:   Future     Number of Occurrences:   1     Standing Expiration Date:   2/16/2023     Chelsea ORELLANA RN am scribing for and in the presence of Dr. Jorge Hair  2/16/2022 8:53 AM      I have reviewed and made changes accordingly to the work scribed by Chelsea Buchanan RN. The documentation accurately reflects work and decisions made by me. I have also reviewed documentation completed by clinical staff.     Jorge Hair DO, [facility]  2/16/2022 8:53 AM    This note is created with the assistance of a speech recognition program.  While intending to generate a document that actually reflects the content of the visit, the document can still have some errors including those of syntax and sound a like substitutions which may escape proof reading.  In such instances, actual meaning can be extrapolated by contextual diversion      Electronically signed by Lenin German DO, FAOAO on 2/16/2022 at 8:53 AM

## 2022-02-26 ENCOUNTER — PATIENT MESSAGE (OUTPATIENT)
Dept: FAMILY MEDICINE CLINIC | Age: 52
End: 2022-02-26

## 2022-02-26 DIAGNOSIS — F45.8 BRUXISM: ICD-10-CM

## 2022-02-27 RX ORDER — METHOCARBAMOL 500 MG/1
500 TABLET, FILM COATED ORAL 3 TIMES DAILY PRN
Qty: 30 TABLET | Refills: 5 | Status: SHIPPED | OUTPATIENT
Start: 2022-02-27 | End: 2022-03-09

## 2022-02-27 NOTE — TELEPHONE ENCOUNTER
From: Tessie Cardoza  To: Estefany Zurita  Sent: 2/26/2022 7:31 PM EST  Subject: Follow up    Muscle relaxers ok. I only take when I think I need to. Slight carryover till morning if I take to late.  Good with script and request some more

## 2022-03-04 DIAGNOSIS — M16.0 BILATERAL HIP JOINT ARTHRITIS: ICD-10-CM

## 2022-03-04 DIAGNOSIS — M76.892 HAMSTRING TENDONITIS OF LEFT THIGH: ICD-10-CM

## 2022-03-04 RX ORDER — MELOXICAM 15 MG/1
15 TABLET ORAL DAILY
Qty: 30 TABLET | Refills: 2 | Status: SHIPPED | OUTPATIENT
Start: 2022-03-04 | End: 2022-04-24 | Stop reason: SDUPTHER

## 2022-03-04 NOTE — TELEPHONE ENCOUNTER
IV infilitrated, IV team called and DR Connolly made aware a delay with Aztreonam. Will continue to monitor. Patient notified

## 2022-04-18 ENCOUNTER — TELEPHONE (OUTPATIENT)
Dept: ORTHOPEDIC SURGERY | Age: 52
End: 2022-04-18

## 2022-04-18 NOTE — TELEPHONE ENCOUNTER
Patient has an office appt 04/25 to discuss surgery. He is trying to get things planned and situated. He would like a return call with an estimate of when surgery would be scheduled and how long is expected recovery.

## 2022-04-24 DIAGNOSIS — M16.0 BILATERAL HIP JOINT ARTHRITIS: ICD-10-CM

## 2022-04-24 DIAGNOSIS — M76.892 HAMSTRING TENDONITIS OF LEFT THIGH: ICD-10-CM

## 2022-04-24 DIAGNOSIS — I10 ESSENTIAL HYPERTENSION: ICD-10-CM

## 2022-04-25 ENCOUNTER — OFFICE VISIT (OUTPATIENT)
Dept: ORTHOPEDIC SURGERY | Age: 52
End: 2022-04-25
Payer: COMMERCIAL

## 2022-04-25 VITALS — WEIGHT: 172 LBS | HEIGHT: 69 IN | BODY MASS INDEX: 25.48 KG/M2

## 2022-04-25 DIAGNOSIS — M16.12 ARTHRITIS OF LEFT HIP: Primary | ICD-10-CM

## 2022-04-25 DIAGNOSIS — Z01.818 PRE-OP TESTING: ICD-10-CM

## 2022-04-25 PROCEDURE — 99213 OFFICE O/P EST LOW 20 MIN: CPT | Performed by: STUDENT IN AN ORGANIZED HEALTH CARE EDUCATION/TRAINING PROGRAM

## 2022-04-25 RX ORDER — MELOXICAM 15 MG/1
15 TABLET ORAL DAILY
Qty: 30 TABLET | Refills: 2 | Status: SHIPPED | OUTPATIENT
Start: 2022-04-25

## 2022-04-25 RX ORDER — HYDRALAZINE HYDROCHLORIDE 25 MG/1
25 TABLET, FILM COATED ORAL 2 TIMES DAILY
Qty: 60 TABLET | Refills: 5 | Status: SHIPPED | OUTPATIENT
Start: 2022-04-25

## 2022-04-25 RX ORDER — TRIAMTERENE AND HYDROCHLOROTHIAZIDE 37.5; 25 MG/1; MG/1
1 TABLET ORAL DAILY
Qty: 30 TABLET | Refills: 5 | Status: SHIPPED | OUTPATIENT
Start: 2022-04-25

## 2022-04-25 RX ORDER — DILTIAZEM HYDROCHLORIDE 240 MG/1
240 CAPSULE, EXTENDED RELEASE ORAL DAILY
Qty: 30 CAPSULE | Refills: 5 | Status: SHIPPED | OUTPATIENT
Start: 2022-04-25

## 2022-04-25 NOTE — PROGRESS NOTES
[address]  [address] [facility] [address]  Dept: [phone]  Dept Fax: [phone]        Orthopaedic Clinic Follow Up      Subjective:     Nevaeh Storey is a 46 y.o. male who presents to the clinic today for routine followup regarding his left hip pain. Patient states that over the past month he has been having significantly worsening left hip pain. Pain is localized to the groin and he does also have occasional popping and clicking along the lateral aspect of the hip at the level of the greater trochanter. He states that the pain is daily and can be uncomfortable with both walking and sitting. He has tried conservative measures including ibuprofen, Tylenol, and meloxicam which he does not believe helped. He has done physical therapy in the past with minimal improvement. He states he walks with a limp and has fears of falling secondary to the pain. Works as a  and wishes to continue with his job and is interested in surgical intervention at this time. He does state that he is a 4 to 5 cigarette smoker but states he is able to quit this whenever he wants. He also has a medical marijuana card and uses this for pain control on occasion. Past medical history significant for hypertension. ROS:  Gen: No fevers/chills   Cardio: no chest pain   Lungs: no shortness of breath   MSK: Pain in left hip    Neuro: no numbness, tingling, weakness     Objective :   Physical exam  Gen: AAOx3, no acute distress  Cardio: regular rate  Lungs: symmetrical chest expansion, no audible wheezing   MSK: Left hip: Patient ambulates with mild shortening weightbearing phase and antalgic gait to LLE. There is no erythema or warmth on skin. There is a small dark-colored lesion measuring approximately 5mm on the proximal anterior aspect of the thigh which the patient states sometimes increases in size and then falls off.  No purulent drainage or surrounding erythema around lesion. Positive logroll and Stinchfield test.  Full range of motion of the left knee with flexion extension from 0 to 125 degrees. Sensation intact to light touch distally with extremity warm and well-perfused. Radiology:  No imaging taken from today's visit. Assessment:   46y.o. year old male with the following:      Diagnosis Orders   1. Arthritis of left hip     2. Pre-op testing        Plan:      Patient seen and examined today. Given worsening symptomology of left hip pain, we discussed the risks and benefits of surgical intervention by means of left total hip arthroplasty. The patient wishes to move forward with total hip replacement at this time. We did have a long discussion regarding smoking cessation which the patient says he is able to do at any time as he only smokes 4 to 5 cigarettes a day. Otherwise BMI is 25.4 and he is not diabetic. We will schedule him for left total hip arthroplasty and then see him back in the office 2 weeks postoperatively. We did recommend that the patient have the lesion on his anterior thigh evaluated by a family physician for which she was amenable. Follow up:Return for 2 weeks postop left total hip. No orders of the defined types were placed in this encounter. No orders of the defined types were placed in this encounter.       Electronically signed by Gunjan Blount DO on 4/25/2022 at 9:03 AM

## 2022-05-16 ENCOUNTER — OFFICE VISIT (OUTPATIENT)
Dept: FAMILY MEDICINE CLINIC | Age: 52
End: 2022-05-16
Payer: COMMERCIAL

## 2022-05-16 VITALS
HEIGHT: 69 IN | BODY MASS INDEX: 24.29 KG/M2 | TEMPERATURE: 97 F | HEART RATE: 62 BPM | DIASTOLIC BLOOD PRESSURE: 70 MMHG | OXYGEN SATURATION: 98 % | WEIGHT: 164 LBS | SYSTOLIC BLOOD PRESSURE: 122 MMHG

## 2022-05-16 DIAGNOSIS — M16.12 PRIMARY OSTEOARTHRITIS OF LEFT HIP: Primary | ICD-10-CM

## 2022-05-16 DIAGNOSIS — L98.9 CHANGING SKIN LESION: ICD-10-CM

## 2022-05-16 PROCEDURE — 99214 OFFICE O/P EST MOD 30 MIN: CPT | Performed by: PHYSICIAN ASSISTANT

## 2022-05-16 RX ORDER — METHOCARBAMOL 500 MG/1
500 TABLET, FILM COATED ORAL 3 TIMES DAILY PRN
COMMUNITY
Start: 2022-04-24

## 2022-05-16 ASSESSMENT — ENCOUNTER SYMPTOMS
SHORTNESS OF BREATH: 0
RHINORRHEA: 0
CONSTIPATION: 0
SINUS PAIN: 0
COLOR CHANGE: 0
SINUS PRESSURE: 0
BLOOD IN STOOL: 0
BACK PAIN: 0
DIARRHEA: 0
VOMITING: 0
SORE THROAT: 0
COUGH: 0
WHEEZING: 0
NAUSEA: 0
ABDOMINAL PAIN: 0
ROS SKIN COMMENTS: CHANGING SKIN LESION

## 2022-05-16 NOTE — PROGRESS NOTES
[address] [facility]  [address] [address]  Dept: [phone]  Dept Fax: [phone]    Yolie Montesinos is a 46 y.o. male who presents today for his medical conditions/complaintsas noted below. Yolie Montesinos is c/o of   Chief Complaint   Patient presents with    Hip Pain     left     Surgical Clearance     Left hip replacement- pre-admission testing is this Thurdsday    Skin Tag     in left hip area         HPI:     HPI     Patient here for preop examination for upcoming left hip total replacement with Dr. Chandrika Melvin in June. [facility] He states he is really ready to get this done. He is active daily, works for the post office. He states has appointment for preop testing later this week. Orders pended in system for labs, CXR and ekg  Patient is a smoker  He has a skin lesion on the left hip that he would like looked at. States he has been placing compound W on this and it dries up, flakes off but always returns. Has been present the last 6 months      Hemoglobin A1C (%)   Date Value   02/03/2021 5.4   09/26/2017 5.0             ( goal A1Cis < 7)   No results found for: LABMICR  LDL Cholesterol (mg/dL)   Date Value   02/03/2021 95   12/19/2016 62       (goal LDL is <100)   AST (U/L)   Date Value   02/03/2021 18     ALT (U/L)   Date Value   02/03/2021 20     BUN (mg/dL)   Date Value   02/03/2021 14     BP Readings from Last 3 Encounters:   05/16/22 122/70   01/05/22 118/72   05/26/21 (!) 144/96          (goal 120/80)    Past Medical History:   Diagnosis Date    Tobacco abuse       History reviewed. No pertinent surgical history.     Family History   Problem Relation Age of Onset   Abena See Cancer Mother         lung, cancer    No Known Problems Father     No Known Problems Sister     Hypertension Paternal Grandfather        Social History     Tobacco Use    Smoking status: Light Tobacco Smoker     Packs/day: 0.25     Types: Cigarettes    Smokeless tobacco: Former User    Tobacco comment: smokes occasionally   Substance Use Topics    Alcohol use: Not Currently      Current Outpatient Medications   Medication Sig Dispense Refill    methocarbamol (ROBAXIN) 500 MG tablet TAKE 1 TABLET BY MOUTH THREE TIMES A DAY AS NEEDED      hydrALAZINE (APRESOLINE) 25 MG tablet Take 1 tablet by mouth 2 times daily 60 tablet 5    triamterene-hydroCHLOROthiazide (MAXZIDE-25) 37.5-25 MG per tablet Take 1 tablet by mouth daily 30 tablet 5    dilTIAZem (DILACOR XR) 240 MG extended release capsule Take 1 capsule by mouth daily 30 capsule 5    meloxicam (MOBIC) 15 MG tablet Take 1 tablet by mouth daily 30 tablet 2     No current facility-administered medications for this visit. No Known Allergies    Health Maintenance   Topic Date Due    Pneumococcal 0-64 years Vaccine (1 - PCV) Never done    Shingles vaccine (1 of 2) Never done    Depression Monitoring  05/26/2022    Colorectal Cancer Screen  02/25/2023    Lipids  02/03/2026    DTaP/Tdap/Td vaccine (2 - Td or Tdap) 01/27/2031    Flu vaccine  Completed    COVID-19 Vaccine  Completed    Hepatitis C screen  Completed    HIV screen  Completed    Hepatitis A vaccine  Aged Out    Hepatitis B vaccine  Aged Out    Hib vaccine  Aged Out    Meningococcal (ACWY) vaccine  Aged Out       Subjective:     Review of Systems   Constitutional: Negative for activity change, appetite change, fatigue, fever and unexpected weight change. /70 (Site: Left Upper Arm, Position: Sitting, Cuff Size: Large Adult)   Pulse 62   Temp 97 °F (36.1 °C) (Tympanic)   Ht 5' 9\" (1.753 m)   Wt 164 lb (74.4 kg)   SpO2 98%   BMI 24.22 kg/m²    HENT: Negative for congestion, dental problem, ear pain, postnasal drip, rhinorrhea, sinus pressure, sinus pain and sore throat. Respiratory: Negative for cough, shortness of breath and wheezing.     Cardiovascular: Negative for chest pain, palpitations and leg swelling. Gastrointestinal: Negative for abdominal pain, blood in stool, constipation, diarrhea, nausea and vomiting. Genitourinary: Negative for dysuria and hematuria. Musculoskeletal: Positive for arthralgias. Negative for back pain. Skin: Negative for color change, pallor, rash and wound. Changing skin lesion   Neurological: Negative for weakness. Hematological: Negative for adenopathy. Psychiatric/Behavioral: The patient is not nervous/anxious. Objective:     Physical Exam  Vitals and nursing note reviewed. Constitutional:       General: He is not in acute distress. Appearance: Normal appearance. He is well-developed. He is not ill-appearing. HENT:      Head: Normocephalic and atraumatic. Right Ear: Tympanic membrane, ear canal and external ear normal. There is no impacted cerumen. Tympanic membrane is not perforated, erythematous, retracted or bulging. Left Ear: Tympanic membrane, ear canal and external ear normal. There is no impacted cerumen. Tympanic membrane is not perforated, erythematous, retracted or bulging. Nose: Nose normal. No congestion. Mouth/Throat:      Mouth: Mucous membranes are moist.      Pharynx: No oropharyngeal exudate or posterior oropharyngeal erythema. Eyes:      Pupils: Pupils are equal, round, and reactive to light. Neck:      Thyroid: No thyromegaly. Cardiovascular:      Rate and Rhythm: Normal rate and regular rhythm. Heart sounds: Normal heart sounds. No murmur heard. Pulmonary:      Effort: Pulmonary effort is normal. No respiratory distress. Breath sounds: Normal breath sounds. No wheezing, rhonchi or rales. Abdominal:      General: Bowel sounds are normal. There is no distension. Palpations: Abdomen is soft. There is no mass. Tenderness: There is no abdominal tenderness. There is no left CVA tenderness, guarding or rebound.    Musculoskeletal:         General: Normal range of motion. Cervical back: Normal range of motion and neck supple. Right lower leg: No edema. Left lower leg: No edema. Lymphadenopathy:      Cervical: No cervical adenopathy. Skin:     General: Skin is warm and dry. Findings: Lesion (anterior left hip with dry appearing dark lesion. patient presently has compound W on this. it is difficult to assess due to topical treatment) present. No rash. Neurological:      Mental Status: He is alert and oriented to person, place, and time. Coordination: Coordination normal.   Psychiatric:         Mood and Affect: Mood normal.         Behavior: Behavior normal.         Thought Content: Thought content normal.         Judgment: Judgment normal.       /70 (Site: Left Upper Arm, Position: Sitting, Cuff Size: Large Adult)   Pulse 62   Temp 97 °F (36.1 °C) (Tympanic)   Ht 5' 9\" (1.753 m)   Wt 164 lb (74.4 kg)   SpO2 98%   BMI 24.22 kg/m²     Assessment:       Diagnosis Orders   1. Primary osteoarthritis of left hip  ANTOLIN Yan, Dermatology, [place]   2. Changing skin lesion  ANTOLIN Yan, Dermatology, [facility]:      Return if symptoms worsen or fail to improve. Physical today went well. Would like to see his preop testing after completed, await results  Patient does have changing lesion, suspect may be a SK. Difficult to assess due to current treatment.  I recommended he avoid using any treatment for now and we placed a referral to derm to discuss removal. Patient agreed with this plan and will call for appointment  Patient agreed with treatment plan    Orders Placed This Encounter   Procedures   [facility]     Referral Priority:   Routine     Referral Type:   Eval and Treat     Referral Reason:   Specialty Services Required     Referred to Provider:   Eliecer Lopez PA-C     Requested Specialty:   Dermatology     Number of Visits Requested:   1       Patient given educational materials - see patient instructions. Discussed use, benefit, and side effects of prescribed medications. All patientquestions answered. Pt voiced understanding. Reviewed health maintenance. Instructedto continue current medications, diet and exercise. Patient agreed with treatmentplan. Follow up as directed. Please note that this chart was generated using voice recognition Dragon dictation software. Although every effort was made to ensure the accuracy of this automated transcription, some errors in transcription may have occurred.      Electronically signed by Michaela Mcpherson PA-C on 5/16/2022 at 8:45 AM

## 2022-05-16 NOTE — PROGRESS NOTES
Visit Information    Have you changed or started any medications since your last visit including any over-the-counter medicines, vitamins, or herbal medicines? no   Are you having any side effects from any of your medications? -  no  Have you stopped taking any of your medications? Is so, why? -  no    Have you seen any other physician or provider since your last visit? No  Have you had any other diagnostic tests since your last visit? No  Have you been seen in the emergency room and/or had an admission to a hospital since we last saw you? No  Have you had your routine dental cleaning in the past 6 months? no    Have you activated your [employer] account? If not, what are your barriers?  Yes     Patient Care Team:  ESTELA Sampson CNP as PCP - General (Family Nurse Practitioner)  ESTELA Sampson CNP as PCP - [facility]  Sharita Birch RN as Ambulatory Care Manager    Medical History Review  Past Medical, Family, and Social History reviewed and  contribute to the patient presenting condition    Health Maintenance   Topic Date Due    Pneumococcal 0-64 years Vaccine (1 - PCV) Never done    Shingles vaccine (1 of 2) Never done    Depression Monitoring  05/26/2022    Colorectal Cancer Screen  02/25/2023    Lipids  02/03/2026    DTaP/Tdap/Td vaccine (2 - Td or Tdap) 01/27/2031    Flu vaccine  Completed    COVID-19 Vaccine  Completed    Hepatitis C screen  Completed    HIV screen  Completed    Hepatitis A vaccine  Aged Out    Hepatitis B vaccine  Aged Out    Hib vaccine  Aged Out    Meningococcal (ACWY) vaccine  Aged Out

## 2022-05-19 ENCOUNTER — HOSPITAL ENCOUNTER (OUTPATIENT)
Dept: GENERAL RADIOLOGY | Age: 52
Discharge: HOME | End: 2022-05-21
Payer: COMMERCIAL

## 2022-05-19 ENCOUNTER — HOSPITAL ENCOUNTER (OUTPATIENT)
Dept: PREADMISSION TESTING | Age: 52
Discharge: HOME | End: 2022-05-23
Payer: COMMERCIAL

## 2022-05-19 VITALS
BODY MASS INDEX: 24.49 KG/M2 | DIASTOLIC BLOOD PRESSURE: 80 MMHG | TEMPERATURE: 97.8 F | HEIGHT: 69 IN | RESPIRATION RATE: 16 BRPM | SYSTOLIC BLOOD PRESSURE: 155 MMHG | HEART RATE: 75 BPM | OXYGEN SATURATION: 99 % | WEIGHT: 165.34 LBS

## 2022-05-19 DIAGNOSIS — Z01.818 PRE-OP TESTING: ICD-10-CM

## 2022-05-19 LAB
ABO/RH: NORMAL
ALBUMIN SERPL-MCNC: 5 G/DL (ref 3.5–5.2)
ALP BLD-CCNC: 58 U/L (ref 40–129)
ALT SERPL-CCNC: 27 U/L (ref 5–41)
ANION GAP SERPL CALCULATED.3IONS-SCNC: 11 MMOL/L (ref 9–17)
ANTIBODY SCREEN: NEGATIVE
ARM BAND NUMBER: NORMAL
AST SERPL-CCNC: 20 U/L
BILIRUB SERPL-MCNC: 0.37 MG/DL (ref 0.3–1.2)
BILIRUBIN URINE: NEGATIVE
BUN BLDV-MCNC: 15 MG/DL (ref 6–20)
BUN/CREAT BLD: 22 (ref 9–20)
CALCIUM SERPL-MCNC: 9.5 MG/DL (ref 8.6–10.4)
CHLORIDE BLD-SCNC: 104 MMOL/L (ref 98–107)
CO2: 24 MMOL/L (ref 20–31)
COLOR: YELLOW
COMMENT UA: NORMAL
CREAT SERPL-MCNC: 0.67 MG/DL (ref 0.7–1.2)
ESTIMATED AVERAGE GLUCOSE: 97 MG/DL
EXPIRATION DATE: NORMAL
GFR AFRICAN AMERICAN: >60 ML/MIN
GFR NON-AFRICAN AMERICAN: >60 ML/MIN
GFR SERPL CREATININE-BSD FRML MDRD: ABNORMAL ML/MIN/{1.73_M2}
GLUCOSE BLD-MCNC: 104 MG/DL (ref 70–99)
GLUCOSE URINE: NEGATIVE
HBA1C MFR BLD: 5 % (ref 4–6)
HCT VFR BLD CALC: 45.6 % (ref 40.7–50.3)
HEMOGLOBIN: 14.8 G/DL (ref 13–17)
KETONES, URINE: NEGATIVE
LEUKOCYTE ESTERASE, URINE: NEGATIVE
MCH RBC QN AUTO: 31.5 PG (ref 25.2–33.5)
MCHC RBC AUTO-ENTMCNC: 32.5 G/DL (ref 28.4–34.8)
MCV RBC AUTO: 97 FL (ref 82.6–102.9)
NITRITE, URINE: NEGATIVE
NRBC AUTOMATED: 0 PER 100 WBC
PDW BLD-RTO: 11.8 % (ref 11.8–14.4)
PH UA: 7.5 (ref 5–8)
PLATELET # BLD: 220 K/UL (ref 138–453)
PMV BLD AUTO: 9.8 FL (ref 8.1–13.5)
POTASSIUM SERPL-SCNC: 4.5 MMOL/L (ref 3.7–5.3)
PROTEIN UA: NEGATIVE
RBC # BLD: 4.7 M/UL (ref 4.21–5.77)
SODIUM BLD-SCNC: 139 MMOL/L (ref 135–144)
SPECIFIC GRAVITY UA: 1.01 (ref 1–1.03)
TOTAL PROTEIN: 7.3 G/DL (ref 6.4–8.3)
TURBIDITY: CLEAR
URINE HGB: NEGATIVE
UROBILINOGEN, URINE: NORMAL
WBC # BLD: 5.6 K/UL (ref 3.5–11.3)

## 2022-05-19 PROCEDURE — 85027 COMPLETE CBC AUTOMATED: CPT

## 2022-05-19 PROCEDURE — 86850 RBC ANTIBODY SCREEN: CPT

## 2022-05-19 PROCEDURE — 93005 ELECTROCARDIOGRAM TRACING: CPT

## 2022-05-19 PROCEDURE — 87641 MR-STAPH DNA AMP PROBE: CPT

## 2022-05-19 PROCEDURE — 83036 HEMOGLOBIN GLYCOSYLATED A1C: CPT

## 2022-05-19 PROCEDURE — 36415 COLL VENOUS BLD VENIPUNCTURE: CPT

## 2022-05-19 PROCEDURE — 81003 URINALYSIS AUTO W/O SCOPE: CPT

## 2022-05-19 PROCEDURE — 86901 BLOOD TYPING SEROLOGIC RH(D): CPT

## 2022-05-19 PROCEDURE — 71046 X-RAY EXAM CHEST 2 VIEWS: CPT

## 2022-05-19 PROCEDURE — 80053 COMPREHEN METABOLIC PANEL: CPT

## 2022-05-19 PROCEDURE — 86900 BLOOD TYPING SEROLOGIC ABO: CPT

## 2022-05-19 RX ORDER — GABAPENTIN 300 MG/1
300 CAPSULE ORAL ONCE
Status: CANCELLED | OUTPATIENT
Start: 2022-06-13

## 2022-05-19 RX ORDER — CELECOXIB 200 MG/1
200 CAPSULE ORAL ONCE
Status: CANCELLED | OUTPATIENT
Start: 2022-06-13

## 2022-05-19 ASSESSMENT — PROMIS GLOBAL HEALTH SCALE
IN GENERAL, HOW WOULD YOU RATE YOUR PHYSICAL HEALTH [ON A SCALE OF 1 (POOR) TO 5 (EXCELLENT)]?: 5
HOW IS THE PROMIS V1.1 BEING ADMINISTERED?: 0
IN GENERAL, HOW WOULD YOU RATE YOUR MENTAL HEALTH, INCLUDING YOUR MOOD AND YOUR ABILITY TO THINK [ON A SCALE OF 1 (POOR) TO 5 (EXCELLENT)]?: 5
IN THE PAST 7 DAYS, HOW OFTEN HAVE YOU BEEN BOTHERED BY EMOTIONAL PROBLEMS, SUCH AS FEELING ANXIOUS, DEPRESSED, OR IRRITABLE [ON A SCALE FROM 1 (NEVER) TO 5 (ALWAYS)]?: 3
IN GENERAL, WOULD YOU SAY YOUR QUALITY OF LIFE IS...[ON A SCALE OF 1 (POOR) TO 5 (EXCELLENT)]: 5
IN GENERAL, PLEASE RATE HOW WELL YOU CARRY OUT YOUR USUAL SOCIAL ACTIVITIES (INCLUDES ACTIVITIES AT HOME, AT WORK, AND IN YOUR COMMUNITY, AND RESPONSIBILITIES AS A PARENT, CHILD, SPOUSE, EMPLOYEE, FRIEND, ETC) [ON A SCALE OF 1 (POOR) TO 5 (EXCELLENT)]?: 5
IN THE PAST 7 DAYS, HOW WOULD YOU RATE YOUR PAIN ON AVERAGE [ON A SCALE FROM 0 (NO PAIN) TO 10 (WORST IMAGINABLE PAIN)]?: 6
SUM OF RESPONSES TO QUESTIONS 3, 6, 7, & 8: 15
WHO IS THE PERSON COMPLETING THE PROMIS V1.1 SURVEY?: 0
IN GENERAL, HOW WOULD YOU RATE YOUR SATISFACTION WITH YOUR SOCIAL ACTIVITIES AND RELATIONSHIPS [ON A SCALE OF 1 (POOR) TO 5 (EXCELLENT)]?: 5
IN GENERAL, WOULD YOU SAY YOUR HEALTH IS...[ON A SCALE OF 1 (POOR) TO 5 (EXCELLENT)]: 5
IN THE PAST 7 DAYS, HOW WOULD YOU RATE YOUR FATIGUE ON AVERAGE [ON A SCALE FROM 1 (NONE) TO 5 (VERY SEVERE)]?: 2
SUM OF RESPONSES TO QUESTIONS 2, 4, 5, & 10: 18
TO WHAT EXTENT ARE YOU ABLE TO CARRY OUT YOUR EVERYDAY PHYSICAL ACTIVITIES SUCH AS WALKING, CLIMBING STAIRS, CARRYING GROCERIES, OR MOVING A CHAIR [ON A SCALE OF 1 (NOT AT ALL) TO 5 (COMPLETELY)]?: 2

## 2022-05-19 ASSESSMENT — PAIN DESCRIPTION - FREQUENCY: FREQUENCY: CONTINUOUS

## 2022-05-19 ASSESSMENT — PAIN DESCRIPTION - DESCRIPTORS: DESCRIPTORS: ACHING

## 2022-05-19 ASSESSMENT — HOOS JR
RISING FROM SITTING: 3
HOOS JR RAW SCORE: 17
WALKING ON UNEVEN SURFACE: 2
BENDING TO THE FLOOR TO PICK UP OBJECT: 3
GOING UP OR DOWN STAIRS: 3
HOOS JR TOTAL INTERVAL SCORE: 36.363
HOOS JR RAW SCORE: 17
SITTING: 3
LYING IN BED (TURNING OVER, MAINTAINING HIP POSITION): 3

## 2022-05-19 ASSESSMENT — PAIN DESCRIPTION - PAIN TYPE: TYPE: CHRONIC PAIN

## 2022-05-19 ASSESSMENT — PAIN DESCRIPTION - ORIENTATION: ORIENTATION: LEFT

## 2022-05-19 ASSESSMENT — PAIN DESCRIPTION - LOCATION: LOCATION: HIP

## 2022-05-19 ASSESSMENT — PAIN SCALES - GENERAL: PAINLEVEL_OUTOF10: 3

## 2022-05-19 ASSESSMENT — PAIN DESCRIPTION - ONSET: ONSET: ON-GOING

## 2022-05-19 NOTE — PRE-PROCEDURE INSTRUCTIONS
ARRIVE AT [facility] ON Monday, June 13,2022 at 08:30 AM  Von Edgar from the office will call you a few days before surgery to confirm the arrival time of surgery. Once you enter the hospital lobby, take the elevators to the second floor. Check-In is at the surgery registration desk. Continue to take your home medications as you normally do up to and including the night before surgery with the exception of any blood thinning medications. Please stop any blood thinning medications as directed by your surgeon or prescribing physician. Failure to stop certain medications may interfere with your scheduled surgery. These may include:  Aspirin, Warfarin (Coumadin), Clopidogrel (Plavix), Ibuprofen (Motrin, Advil), Naproxen (Aleve), Meloxicam (Mobic), Celecoxib (Celebrex), Eliquis, Pradaxa, Xarelto, Effient, Fish Oil, Herbal supplements. Stop mobic 7 days before surgery    Please take the following medication(s) the day of surgery with a small sip of water:  Diltiazem,hydralizine        PREPARING FOR YOUR SURGERY:     Before surgery, you can play an important role in your own health. Because skin is not sterile, we need to be sure that your skin is as free of germs as possible before surgery by carefully washing before surgery. Preparing or prepping skin before surgery can reduce the risk of a surgical site infection.   Do not shave the area of your body where your surgery will be performed unless you received specific permission from your physician. You will need to shower at home the night before surgery and the morning of surgery with a special soap called chlorhexidine gluconate (CHG*). *Not to be used by people allergic to Chlorhexidine Gluconate (CHG). Following these instructions will help you be sure that your skin is clean before surgery. Instructions on cleaning your skin before surgery:      The night before your surgery:      You will need to shower with warm water (not hot) and the CHG soap.  Use a clean wash cloth and a clean towel. Have clean clothes available to put on after the shower.   First wash your hair with regular shampoo. Rinse your hair and body thoroughly to remove the shampoo.  Wash your face and genital area (private parts) with your regular soap or water only. Thoroughly rinse your body with warm water from the neck down.  Turn water off to prevent rinsing the soap off too soon.  With a clean wet washcloth and half of the CHG soap in the bottle, lather your entire body from the neck down. Do not use CHG soap near your eyes or ears to avoid injury to those areas.  Wash thoroughly, paying special attention to the area where your surgery will be performed.  Wash your body gently for five (5) minutes. Avoid scrubbing your skin too hard.  Turn the water back on and rinse your body thoroughly.  Pat yourself dry with a clean, soft towel. Do not apply lotion, cream or powder.  Dress with clean freshly washed clothes. The morning of surgery:     Repeat shower following steps above - using remaining half of CHG soap in bottle. Patient Instructions:    [facility] If you are having any type of anesthesia you are to have nothing to eat or drink after midnight the night before your surgery. This includes gum, hard candy, mints, water or smoking or chewing tobacco.  The only exception to this is a small sip of water to take with any morning dose of heart, blood pressure, or seizure medications. No alcoholic beverages for 24 hours prior to surgery.  Brush your teeth but do not swallow water.  Bring your eyeglasses and case with you. No contacts are to be worn the day of surgery. You also may bring your hearing aids. Most surgical procedures involving anesthesia will require that you remove your dentures prior to surgery. · Do not wear any jewelry or body piercings day of surgery.   Also, NO lotion, perfume or deodorant to be used the day of surgery. No nail polish on the operative extremity (arm/leg surgeries)    · If you are staying overnight with us, please bring a small bag of necessary personal items.  Please wear loose, comfortable clothing. If you are potentially going to have a cast or brace bring clothing that will fit over them.  In case of illness - If you have cold or flu like symptoms (high fever, runny nose, sore throat, cough, etc.) rash, nausea, vomiting, loose stools, and/or recent contact with someone who has a contagious disease (chicken pox, measles, etc.) Please call your doctor before coming to the hospital.         Day of Surgery/Procedure:    As a patient at [facility] you can expect quality medical and nursing care that is centered on your individual needs. Our goal is to make your surgical experience as comfortable as possible    . Transportation After Your Surgery/Procedure: You will need a friend or family member to drive you home after your procedure. Your  must be 25years of age or older and able to sign off on your discharge instructions. A taxi cab or any other form of public transportation is not acceptable. Your friend or family member must stay at the hospital throughout your procedure. Someone must remain with you for the first 24 hours after your surgery if you receive anesthesia or medication. If you do not have someone to stay with you, your procedure may be cancelled.       If you have any other questions regarding your procedure or the day of surgery, please call [phone]      _________________________  ____________________________  Signature (Patient)              Signature (Provider) & date

## 2022-05-20 LAB
EKG ATRIAL RATE: 80 BPM
EKG P AXIS: 57 DEGREES
EKG P-R INTERVAL: 156 MS
EKG Q-T INTERVAL: 378 MS
EKG QRS DURATION: 112 MS
EKG QTC CALCULATION (BAZETT): 435 MS
EKG R AXIS: -42 DEGREES
EKG T AXIS: 30 DEGREES
EKG VENTRICULAR RATE: 80 BPM
MRSA, DNA, NASAL: NEGATIVE
SPECIMEN DESCRIPTION: NORMAL

## 2022-05-20 PROCEDURE — 93010 ELECTROCARDIOGRAM REPORT: CPT | Performed by: INTERNAL MEDICINE

## 2022-05-23 DIAGNOSIS — R94.31 ABNORMAL EKG: Primary | ICD-10-CM

## 2022-05-23 DIAGNOSIS — M16.12 PRIMARY OSTEOARTHRITIS OF LEFT HIP: ICD-10-CM

## 2022-05-23 DIAGNOSIS — Z01.818 PREOPERATIVE CLEARANCE: ICD-10-CM

## 2022-05-23 NOTE — PROGRESS NOTES
[facility] ([facility]) Joint Replacement Pre-surgical Assessment    Scheduled Surgery Date: 06/13/2022  Surgery Time: 1030    Surgeon: Nicole Lizarraga  Procedure: left Total Hip    Primary Insurance Coverage [facility]  Pre-op class attended YES    PCP: ESTELA Mcgovern CNP  Clearance received by PCP: Yes    Anticipated Discharge Plan: home  Agency (if applicable): UNSURE    Significant PMH:   Surgical History    None       ED Notes    ED Notes      Medical History    Diagnosis Date Comment Source   Hypertension      Tobacco abuse               Smoking history: positive for approximately 0.25 packs per day. Patient advised to quit smoking    Alcohol history: Never drinks    Concerns prior to surgery: MET WITH PT/OT AFTER CLASS, PT MAY NEED A FWW.     Electronically signed by: Mika Menezes RN on 5/23/2022 at 12:55 PM

## 2022-06-07 DIAGNOSIS — M16.12 ARTHRITIS OF LEFT HIP: Primary | ICD-10-CM

## 2022-06-10 ENCOUNTER — ANESTHESIA EVENT (OUTPATIENT)
Dept: OPERATING ROOM | Age: 52
End: 2022-06-10
Payer: COMMERCIAL

## 2022-06-13 ENCOUNTER — APPOINTMENT (OUTPATIENT)
Dept: GENERAL RADIOLOGY | Age: 52
End: 2022-06-13
Attending: ORTHOPAEDIC SURGERY
Payer: COMMERCIAL

## 2022-06-13 ENCOUNTER — HOSPITAL ENCOUNTER (OUTPATIENT)
Age: 52
Discharge: HOME | End: 2022-06-14
Attending: ORTHOPAEDIC SURGERY | Admitting: ORTHOPAEDIC SURGERY
Payer: COMMERCIAL

## 2022-06-13 ENCOUNTER — ANESTHESIA (OUTPATIENT)
Dept: OPERATING ROOM | Age: 52
End: 2022-06-13
Payer: COMMERCIAL

## 2022-06-13 DIAGNOSIS — Z96.642 S/P TOTAL LEFT HIP ARTHROPLASTY: Primary | ICD-10-CM

## 2022-06-13 DIAGNOSIS — Z96.642 STATUS POST TOTAL HIP REPLACEMENT, LEFT: ICD-10-CM

## 2022-06-13 LAB
HCT VFR BLD CALC: 33.1 % (ref 40.7–50.3)
HEMOGLOBIN: 10.9 G/DL (ref 13–17)

## 2022-06-13 PROCEDURE — 2580000003 HC RX 258: Performed by: NURSE ANESTHETIST, CERTIFIED REGISTERED

## 2022-06-13 PROCEDURE — 97162 PT EVAL MOD COMPLEX 30 MIN: CPT

## 2022-06-13 PROCEDURE — 85014 HEMATOCRIT: CPT

## 2022-06-13 PROCEDURE — 2720000010 HC SURG SUPPLY STERILE: Performed by: ORTHOPAEDIC SURGERY

## 2022-06-13 PROCEDURE — 7100000000 HC PACU RECOVERY - FIRST 15 MIN: Performed by: ORTHOPAEDIC SURGERY

## 2022-06-13 PROCEDURE — 2500000003 HC RX 250 WO HCPCS: Performed by: NURSE ANESTHETIST, CERTIFIED REGISTERED

## 2022-06-13 PROCEDURE — 6370000000 HC RX 637 (ALT 250 FOR IP): Performed by: ANESTHESIOLOGY

## 2022-06-13 PROCEDURE — 6370000000 HC RX 637 (ALT 250 FOR IP): Performed by: STUDENT IN AN ORGANIZED HEALTH CARE EDUCATION/TRAINING PROGRAM

## 2022-06-13 PROCEDURE — 6360000002 HC RX W HCPCS: Performed by: ANESTHESIOLOGY

## 2022-06-13 PROCEDURE — 64445 NJX AA&/STRD SCIATIC NRV IMG: CPT | Performed by: ANESTHESIOLOGY

## 2022-06-13 PROCEDURE — 6360000002 HC RX W HCPCS: Performed by: STUDENT IN AN ORGANIZED HEALTH CARE EDUCATION/TRAINING PROGRAM

## 2022-06-13 PROCEDURE — 36415 COLL VENOUS BLD VENIPUNCTURE: CPT

## 2022-06-13 PROCEDURE — 2500000003 HC RX 250 WO HCPCS: Performed by: ANESTHESIOLOGY

## 2022-06-13 PROCEDURE — 85018 HEMOGLOBIN: CPT

## 2022-06-13 PROCEDURE — 7100000001 HC PACU RECOVERY - ADDTL 15 MIN: Performed by: ORTHOPAEDIC SURGERY

## 2022-06-13 PROCEDURE — 73502 X-RAY EXAM HIP UNI 2-3 VIEWS: CPT

## 2022-06-13 PROCEDURE — 6360000002 HC RX W HCPCS: Performed by: ORTHOPAEDIC SURGERY

## 2022-06-13 PROCEDURE — 2580000003 HC RX 258: Performed by: ANESTHESIOLOGY

## 2022-06-13 PROCEDURE — 2709999900 HC NON-CHARGEABLE SUPPLY: Performed by: ORTHOPAEDIC SURGERY

## 2022-06-13 PROCEDURE — 97530 THERAPEUTIC ACTIVITIES: CPT

## 2022-06-13 PROCEDURE — 97167 OT EVAL HIGH COMPLEX 60 MIN: CPT

## 2022-06-13 PROCEDURE — C1713 ANCHOR/SCREW BN/BN,TIS/BN: HCPCS | Performed by: ORTHOPAEDIC SURGERY

## 2022-06-13 PROCEDURE — 6360000002 HC RX W HCPCS: Performed by: NURSE ANESTHETIST, CERTIFIED REGISTERED

## 2022-06-13 PROCEDURE — 97535 SELF CARE MNGMENT TRAINING: CPT

## 2022-06-13 PROCEDURE — 3700000001 HC ADD 15 MINUTES (ANESTHESIA): Performed by: ORTHOPAEDIC SURGERY

## 2022-06-13 PROCEDURE — 3700000000 HC ANESTHESIA ATTENDED CARE: Performed by: ORTHOPAEDIC SURGERY

## 2022-06-13 PROCEDURE — C1776 JOINT DEVICE (IMPLANTABLE): HCPCS | Performed by: ORTHOPAEDIC SURGERY

## 2022-06-13 PROCEDURE — 97110 THERAPEUTIC EXERCISES: CPT

## 2022-06-13 PROCEDURE — 3600000005 HC SURGERY LEVEL 5 BASE: Performed by: ORTHOPAEDIC SURGERY

## 2022-06-13 PROCEDURE — 27130 TOTAL HIP ARTHROPLASTY: CPT | Performed by: ORTHOPAEDIC SURGERY

## 2022-06-13 PROCEDURE — 97112 NEUROMUSCULAR REEDUCATION: CPT

## 2022-06-13 PROCEDURE — 3209999900 FLUORO FOR SURGICAL PROCEDURES

## 2022-06-13 PROCEDURE — 2580000003 HC RX 258: Performed by: STUDENT IN AN ORGANIZED HEALTH CARE EDUCATION/TRAINING PROGRAM

## 2022-06-13 PROCEDURE — 3600000015 HC SURGERY LEVEL 5 ADDTL 15MIN: Performed by: ORTHOPAEDIC SURGERY

## 2022-06-13 DEVICE — FEMORAL HEAD Ø 36 SIZE L
Type: IMPLANTABLE DEVICE | Site: HIP | Status: FUNCTIONAL
Brand: MECTACER BIOLOX DELTA FEMORAL BALL HEAD

## 2022-06-13 DEVICE — ACETABULAR SHELL Ø52 TWO HOLES
Type: IMPLANTABLE DEVICE | Site: HIP | Status: FUNCTIONAL
Brand: MPACT ACETABULAR SYSTEM

## 2022-06-13 DEVICE — CEMENTLESS TI COATED LAT PLUS STEM # 9
Type: IMPLANTABLE DEVICE | Site: HIP | Status: FUNCTIONAL
Brand: MASTERLOC LATPLUS

## 2022-06-13 DEVICE — FLAT PE  HC LINER Ø 36 / E
Type: IMPLANTABLE DEVICE | Site: HIP | Status: FUNCTIONAL
Brand: MPACT ACETABULAR SYSTEM

## 2022-06-13 RX ORDER — SODIUM CHLORIDE, SODIUM LACTATE, POTASSIUM CHLORIDE, CALCIUM CHLORIDE 600; 310; 30; 20 MG/100ML; MG/100ML; MG/100ML; MG/100ML
INJECTION, SOLUTION INTRAVENOUS CONTINUOUS PRN
Status: DISCONTINUED | OUTPATIENT
Start: 2022-06-13 | End: 2022-06-13 | Stop reason: SDUPTHER

## 2022-06-13 RX ORDER — OXYCODONE HYDROCHLORIDE AND ACETAMINOPHEN 5; 325 MG/1; MG/1
1 TABLET ORAL EVERY 6 HOURS PRN
Qty: 28 TABLET | Refills: 0 | Status: SHIPPED | OUTPATIENT
Start: 2022-06-13 | End: 2022-06-20

## 2022-06-13 RX ORDER — LIDOCAINE HYDROCHLORIDE 20 MG/ML
INJECTION, SOLUTION EPIDURAL; INFILTRATION; INTRACAUDAL; PERINEURAL PRN
Status: DISCONTINUED | OUTPATIENT
Start: 2022-06-13 | End: 2022-06-13 | Stop reason: SDUPTHER

## 2022-06-13 RX ORDER — LIDOCAINE HYDROCHLORIDE 10 MG/ML
1 INJECTION, SOLUTION EPIDURAL; INFILTRATION; INTRACAUDAL; PERINEURAL
Status: DISCONTINUED | OUTPATIENT
Start: 2022-06-14 | End: 2022-06-13 | Stop reason: HOSPADM

## 2022-06-13 RX ORDER — VANCOMYCIN HYDROCHLORIDE 1 G/20ML
INJECTION, POWDER, LYOPHILIZED, FOR SOLUTION INTRAVENOUS
Status: DISCONTINUED
Start: 2022-06-13 | End: 2022-06-13

## 2022-06-13 RX ORDER — ASPIRIN 81 MG/1
81 TABLET ORAL 2 TIMES DAILY
Status: DISCONTINUED | OUTPATIENT
Start: 2022-06-14 | End: 2022-06-14 | Stop reason: HOSPADM

## 2022-06-13 RX ORDER — BUPIVACAINE HYDROCHLORIDE 7.5 MG/ML
INJECTION, SOLUTION INTRASPINAL
Status: COMPLETED | OUTPATIENT
Start: 2022-06-13 | End: 2022-06-13

## 2022-06-13 RX ORDER — VANCOMYCIN HYDROCHLORIDE 1 G/20ML
INJECTION, POWDER, LYOPHILIZED, FOR SOLUTION INTRAVENOUS PRN
Status: DISCONTINUED | OUTPATIENT
Start: 2022-06-13 | End: 2022-06-13 | Stop reason: ALTCHOICE

## 2022-06-13 RX ORDER — OXYCODONE HYDROCHLORIDE AND ACETAMINOPHEN 5; 325 MG/1; MG/1
2 TABLET ORAL EVERY 4 HOURS PRN
Status: DISCONTINUED | OUTPATIENT
Start: 2022-06-13 | End: 2022-06-13

## 2022-06-13 RX ORDER — ASPIRIN 81 MG/1
81 TABLET ORAL 2 TIMES DAILY
Qty: 84 TABLET | Refills: 0 | Status: SHIPPED | OUTPATIENT
Start: 2022-06-13 | End: 2022-07-25

## 2022-06-13 RX ORDER — CELECOXIB 200 MG/1
200 CAPSULE ORAL ONCE
Status: COMPLETED | OUTPATIENT
Start: 2022-06-13 | End: 2022-06-13

## 2022-06-13 RX ORDER — OXYCODONE HYDROCHLORIDE 5 MG/1
10 TABLET ORAL EVERY 4 HOURS PRN
Status: DISCONTINUED | OUTPATIENT
Start: 2022-06-13 | End: 2022-06-14 | Stop reason: HOSPADM

## 2022-06-13 RX ORDER — SODIUM CHLORIDE 0.9 % (FLUSH) 0.9 %
5-40 SYRINGE (ML) INJECTION PRN
Status: DISCONTINUED | OUTPATIENT
Start: 2022-06-13 | End: 2022-06-13 | Stop reason: HOSPADM

## 2022-06-13 RX ORDER — MIDAZOLAM HYDROCHLORIDE 1 MG/ML
INJECTION INTRAMUSCULAR; INTRAVENOUS PRN
Status: DISCONTINUED | OUTPATIENT
Start: 2022-06-13 | End: 2022-06-13 | Stop reason: SDUPTHER

## 2022-06-13 RX ORDER — SODIUM CHLORIDE, SODIUM LACTATE, POTASSIUM CHLORIDE, CALCIUM CHLORIDE 600; 310; 30; 20 MG/100ML; MG/100ML; MG/100ML; MG/100ML
INJECTION, SOLUTION INTRAVENOUS CONTINUOUS
Status: DISCONTINUED | OUTPATIENT
Start: 2022-06-13 | End: 2022-06-13

## 2022-06-13 RX ORDER — GABAPENTIN 300 MG/1
300 CAPSULE ORAL ONCE
Status: COMPLETED | OUTPATIENT
Start: 2022-06-13 | End: 2022-06-13

## 2022-06-13 RX ORDER — OXYCODONE HYDROCHLORIDE AND ACETAMINOPHEN 5; 325 MG/1; MG/1
1 TABLET ORAL EVERY 4 HOURS PRN
Status: DISCONTINUED | OUTPATIENT
Start: 2022-06-13 | End: 2022-06-13

## 2022-06-13 RX ORDER — SODIUM CHLORIDE, SODIUM LACTATE, POTASSIUM CHLORIDE, CALCIUM CHLORIDE 600; 310; 30; 20 MG/100ML; MG/100ML; MG/100ML; MG/100ML
INJECTION, SOLUTION INTRAVENOUS CONTINUOUS
Status: DISCONTINUED | OUTPATIENT
Start: 2022-06-14 | End: 2022-06-13

## 2022-06-13 RX ORDER — HYDROMORPHONE HYDROCHLORIDE 2 MG/1
1 TABLET ORAL EVERY 4 HOURS PRN
Status: DISCONTINUED | OUTPATIENT
Start: 2022-06-13 | End: 2022-06-13

## 2022-06-13 RX ORDER — IBUPROFEN 200 MG
800 TABLET ORAL ONCE
Status: DISCONTINUED | OUTPATIENT
Start: 2022-06-13 | End: 2022-06-13

## 2022-06-13 RX ORDER — TRANEXAMIC ACID 100 MG/ML
INJECTION, SOLUTION INTRAVENOUS PRN
Status: DISCONTINUED | OUTPATIENT
Start: 2022-06-13 | End: 2022-06-13 | Stop reason: SDUPTHER

## 2022-06-13 RX ORDER — DEXAMETHASONE SODIUM PHOSPHATE 10 MG/ML
8 INJECTION, SOLUTION INTRAMUSCULAR; INTRAVENOUS ONCE
Status: DISCONTINUED | OUTPATIENT
Start: 2022-06-13 | End: 2022-06-13 | Stop reason: HOSPADM

## 2022-06-13 RX ORDER — ACETAMINOPHEN 500 MG
1000 TABLET ORAL ONCE
Status: COMPLETED | OUTPATIENT
Start: 2022-06-13 | End: 2022-06-13

## 2022-06-13 RX ORDER — PHENYLEPHRINE HCL IN 0.9% NACL 1 MG/10 ML
SYRINGE (ML) INTRAVENOUS PRN
Status: DISCONTINUED | OUTPATIENT
Start: 2022-06-13 | End: 2022-06-13 | Stop reason: SDUPTHER

## 2022-06-13 RX ORDER — MORPHINE SULFATE 2 MG/ML
2 INJECTION, SOLUTION INTRAMUSCULAR; INTRAVENOUS
Status: DISCONTINUED | OUTPATIENT
Start: 2022-06-13 | End: 2022-06-14 | Stop reason: HOSPADM

## 2022-06-13 RX ORDER — ONDANSETRON 2 MG/ML
4 INJECTION INTRAMUSCULAR; INTRAVENOUS
Status: DISCONTINUED | OUTPATIENT
Start: 2022-06-13 | End: 2022-06-13 | Stop reason: HOSPADM

## 2022-06-13 RX ORDER — PROMETHAZINE HYDROCHLORIDE 12.5 MG/1
12.5 TABLET ORAL EVERY 6 HOURS PRN
Status: DISCONTINUED | OUTPATIENT
Start: 2022-06-13 | End: 2022-06-14 | Stop reason: HOSPADM

## 2022-06-13 RX ORDER — SODIUM CHLORIDE 9 MG/ML
INJECTION, SOLUTION INTRAVENOUS CONTINUOUS
Status: DISCONTINUED | OUTPATIENT
Start: 2022-06-14 | End: 2022-06-13

## 2022-06-13 RX ORDER — SODIUM CHLORIDE 0.9 % (FLUSH) 0.9 %
5-40 SYRINGE (ML) INJECTION EVERY 12 HOURS SCHEDULED
Status: DISCONTINUED | OUTPATIENT
Start: 2022-06-13 | End: 2022-06-13 | Stop reason: HOSPADM

## 2022-06-13 RX ORDER — TRANEXAMIC ACID 100 MG/ML
INJECTION, SOLUTION INTRAVENOUS
Status: COMPLETED
Start: 2022-06-13 | End: 2022-06-13

## 2022-06-13 RX ORDER — ACETAMINOPHEN 325 MG/1
650 TABLET ORAL EVERY 6 HOURS
Status: DISCONTINUED | OUTPATIENT
Start: 2022-06-13 | End: 2022-06-14 | Stop reason: HOSPADM

## 2022-06-13 RX ORDER — ROPIVACAINE HYDROCHLORIDE 2 MG/ML
INJECTION, SOLUTION EPIDURAL; INFILTRATION; PERINEURAL
Status: COMPLETED | OUTPATIENT
Start: 2022-06-13 | End: 2022-06-13

## 2022-06-13 RX ORDER — SODIUM CHLORIDE 0.9 % (FLUSH) 0.9 %
5-40 SYRINGE (ML) INJECTION EVERY 12 HOURS SCHEDULED
Status: DISCONTINUED | OUTPATIENT
Start: 2022-06-13 | End: 2022-06-14 | Stop reason: HOSPADM

## 2022-06-13 RX ORDER — FENTANYL CITRATE 50 UG/ML
25 INJECTION, SOLUTION INTRAMUSCULAR; INTRAVENOUS EVERY 5 MIN PRN
Status: DISCONTINUED | OUTPATIENT
Start: 2022-06-13 | End: 2022-06-13 | Stop reason: HOSPADM

## 2022-06-13 RX ORDER — HYDROMORPHONE HYDROCHLORIDE 2 MG/1
2 TABLET ORAL EVERY 4 HOURS PRN
Status: DISCONTINUED | OUTPATIENT
Start: 2022-06-13 | End: 2022-06-13

## 2022-06-13 RX ORDER — SODIUM CHLORIDE 0.9 % (FLUSH) 0.9 %
5-40 SYRINGE (ML) INJECTION PRN
Status: DISCONTINUED | OUTPATIENT
Start: 2022-06-13 | End: 2022-06-14 | Stop reason: HOSPADM

## 2022-06-13 RX ORDER — OXYCODONE HYDROCHLORIDE 5 MG/1
5 TABLET ORAL EVERY 4 HOURS PRN
Status: DISCONTINUED | OUTPATIENT
Start: 2022-06-13 | End: 2022-06-14 | Stop reason: HOSPADM

## 2022-06-13 RX ORDER — ONDANSETRON 2 MG/ML
4 INJECTION INTRAMUSCULAR; INTRAVENOUS EVERY 6 HOURS PRN
Status: DISCONTINUED | OUTPATIENT
Start: 2022-06-13 | End: 2022-06-14 | Stop reason: HOSPADM

## 2022-06-13 RX ORDER — KETAMINE HCL IN NACL, ISO-OSM 100MG/10ML
SYRINGE (ML) INJECTION PRN
Status: DISCONTINUED | OUTPATIENT
Start: 2022-06-13 | End: 2022-06-13 | Stop reason: SDUPTHER

## 2022-06-13 RX ORDER — SODIUM CHLORIDE 9 MG/ML
INJECTION, SOLUTION INTRAVENOUS PRN
Status: DISCONTINUED | OUTPATIENT
Start: 2022-06-13 | End: 2022-06-13 | Stop reason: HOSPADM

## 2022-06-13 RX ORDER — OXYCODONE HYDROCHLORIDE 5 MG/1
5 TABLET ORAL
Status: DISCONTINUED | OUTPATIENT
Start: 2022-06-13 | End: 2022-06-13 | Stop reason: HOSPADM

## 2022-06-13 RX ORDER — SODIUM CHLORIDE 9 MG/ML
INJECTION, SOLUTION INTRAVENOUS CONTINUOUS
Status: DISCONTINUED | OUTPATIENT
Start: 2022-06-13 | End: 2022-06-14 | Stop reason: HOSPADM

## 2022-06-13 RX ORDER — MIDAZOLAM HYDROCHLORIDE 1 MG/ML
2 INJECTION INTRAMUSCULAR; INTRAVENOUS ONCE
Status: COMPLETED | OUTPATIENT
Start: 2022-06-13 | End: 2022-06-13

## 2022-06-13 RX ORDER — SODIUM CHLORIDE, SODIUM LACTATE, POTASSIUM CHLORIDE, AND CALCIUM CHLORIDE .6; .31; .03; .02 G/100ML; G/100ML; G/100ML; G/100ML
500 INJECTION, SOLUTION INTRAVENOUS ONCE
Status: COMPLETED | OUTPATIENT
Start: 2022-06-13 | End: 2022-06-13

## 2022-06-13 RX ORDER — PROPOFOL 10 MG/ML
INJECTION, EMULSION INTRAVENOUS CONTINUOUS PRN
Status: DISCONTINUED | OUTPATIENT
Start: 2022-06-13 | End: 2022-06-13 | Stop reason: SDUPTHER

## 2022-06-13 RX ORDER — FENTANYL CITRATE 50 UG/ML
50 INJECTION, SOLUTION INTRAMUSCULAR; INTRAVENOUS EVERY 5 MIN PRN
Status: DISCONTINUED | OUTPATIENT
Start: 2022-06-13 | End: 2022-06-13 | Stop reason: HOSPADM

## 2022-06-13 RX ORDER — SODIUM CHLORIDE 9 MG/ML
INJECTION, SOLUTION INTRAVENOUS PRN
Status: DISCONTINUED | OUTPATIENT
Start: 2022-06-13 | End: 2022-06-14 | Stop reason: HOSPADM

## 2022-06-13 RX ORDER — GABAPENTIN 300 MG/1
300 CAPSULE ORAL 2 TIMES DAILY
Status: DISCONTINUED | OUTPATIENT
Start: 2022-06-13 | End: 2022-06-14 | Stop reason: HOSPADM

## 2022-06-13 RX ORDER — MELOXICAM 7.5 MG/1
7.5 TABLET ORAL DAILY
Status: DISCONTINUED | OUTPATIENT
Start: 2022-06-13 | End: 2022-06-14 | Stop reason: HOSPADM

## 2022-06-13 RX ADMIN — MIDAZOLAM 1 MG: 1 INJECTION INTRAMUSCULAR; INTRAVENOUS at 08:05

## 2022-06-13 RX ADMIN — SODIUM CHLORIDE, POTASSIUM CHLORIDE, SODIUM LACTATE AND CALCIUM CHLORIDE: 600; 310; 30; 20 INJECTION, SOLUTION INTRAVENOUS at 06:38

## 2022-06-13 RX ADMIN — SODIUM CHLORIDE, POTASSIUM CHLORIDE, SODIUM LACTATE AND CALCIUM CHLORIDE: 600; 310; 30; 20 INJECTION, SOLUTION INTRAVENOUS at 09:18

## 2022-06-13 RX ADMIN — OXYCODONE 10 MG: 5 TABLET ORAL at 12:09

## 2022-06-13 RX ADMIN — ACETAMINOPHEN 650 MG: 325 TABLET ORAL at 20:33

## 2022-06-13 RX ADMIN — CELECOXIB 200 MG: 200 CAPSULE ORAL at 07:19

## 2022-06-13 RX ADMIN — SODIUM CHLORIDE, PRESERVATIVE FREE 10 ML: 5 INJECTION INTRAVENOUS at 11:49

## 2022-06-13 RX ADMIN — Medication 100 MCG: at 08:33

## 2022-06-13 RX ADMIN — MORPHINE SULFATE 2 MG: 2 INJECTION, SOLUTION INTRAMUSCULAR; INTRAVENOUS at 15:22

## 2022-06-13 RX ADMIN — OXYCODONE 10 MG: 5 TABLET ORAL at 16:45

## 2022-06-13 RX ADMIN — CEFAZOLIN SODIUM 2000 MG: 10 INJECTION, POWDER, FOR SOLUTION INTRAVENOUS at 07:40

## 2022-06-13 RX ADMIN — Medication 100 MCG: at 08:40

## 2022-06-13 RX ADMIN — SODIUM CHLORIDE, POTASSIUM CHLORIDE, SODIUM LACTATE AND CALCIUM CHLORIDE 500 ML: 600; 310; 30; 20 INJECTION, SOLUTION INTRAVENOUS at 10:25

## 2022-06-13 RX ADMIN — SODIUM CHLORIDE, POTASSIUM CHLORIDE, SODIUM LACTATE AND CALCIUM CHLORIDE: 600; 310; 30; 20 INJECTION, SOLUTION INTRAVENOUS at 07:27

## 2022-06-13 RX ADMIN — Medication 10 MG: at 08:57

## 2022-06-13 RX ADMIN — Medication 100 MCG: at 07:54

## 2022-06-13 RX ADMIN — SODIUM CHLORIDE: 9 INJECTION, SOLUTION INTRAVENOUS at 12:13

## 2022-06-13 RX ADMIN — CEFAZOLIN SODIUM 2000 MG: 10 INJECTION, POWDER, FOR SOLUTION INTRAVENOUS at 15:18

## 2022-06-13 RX ADMIN — OXYCODONE 10 MG: 5 TABLET ORAL at 20:46

## 2022-06-13 RX ADMIN — MIDAZOLAM 1 MG: 1 INJECTION INTRAMUSCULAR; INTRAVENOUS at 07:50

## 2022-06-13 RX ADMIN — GABAPENTIN 300 MG: 300 CAPSULE ORAL at 20:33

## 2022-06-13 RX ADMIN — Medication 5 MG: at 08:19

## 2022-06-13 RX ADMIN — Medication 100 MCG: at 08:41

## 2022-06-13 RX ADMIN — TRANEXAMIC ACID 1000 MG: 100 INJECTION, SOLUTION INTRAVENOUS at 08:49

## 2022-06-13 RX ADMIN — Medication 10 MG: at 07:59

## 2022-06-13 RX ADMIN — MIDAZOLAM 2 MG: 1 INJECTION, SOLUTION INTRAMUSCULAR; INTRAVENOUS at 07:16

## 2022-06-13 RX ADMIN — PROPOFOL 75 MCG/KG/MIN: 10 INJECTION, EMULSION INTRAVENOUS at 07:36

## 2022-06-13 RX ADMIN — Medication 100 MCG: at 08:49

## 2022-06-13 RX ADMIN — Medication 20 MG: at 07:40

## 2022-06-13 RX ADMIN — ACETAMINOPHEN 650 MG: 325 TABLET ORAL at 12:19

## 2022-06-13 RX ADMIN — MELOXICAM 7.5 MG: 7.5 TABLET ORAL at 11:49

## 2022-06-13 RX ADMIN — SODIUM CHLORIDE: 9 INJECTION, SOLUTION INTRAVENOUS at 20:52

## 2022-06-13 RX ADMIN — ROPIVACAINE HYDROCHLORIDE 40 ML: 2 INJECTION, SOLUTION EPIDURAL; INFILTRATION at 07:14

## 2022-06-13 RX ADMIN — Medication 5 MG: at 08:35

## 2022-06-13 RX ADMIN — CEFAZOLIN SODIUM 2000 MG: 10 INJECTION, POWDER, FOR SOLUTION INTRAVENOUS at 23:17

## 2022-06-13 RX ADMIN — GABAPENTIN 300 MG: 300 CAPSULE ORAL at 07:19

## 2022-06-13 RX ADMIN — BUPIVACAINE HYDROCHLORIDE IN DEXTROSE 2 ML: 7.5 INJECTION, SOLUTION SUBARACHNOID at 07:30

## 2022-06-13 RX ADMIN — ACETAMINOPHEN 1000 MG: 500 TABLET ORAL at 07:19

## 2022-06-13 RX ADMIN — TRANEXAMIC ACID 1000 MG: 100 INJECTION, SOLUTION INTRAVENOUS at 07:45

## 2022-06-13 RX ADMIN — LIDOCAINE HYDROCHLORIDE 80 MG: 20 INJECTION, SOLUTION EPIDURAL; INFILTRATION; INTRACAUDAL; PERINEURAL at 07:36

## 2022-06-13 ASSESSMENT — PAIN SCALES - GENERAL
PAINLEVEL_OUTOF10: 7
PAINLEVEL_OUTOF10: 3
PAINLEVEL_OUTOF10: 7
PAINLEVEL_OUTOF10: 7

## 2022-06-13 ASSESSMENT — PAIN DESCRIPTION - FREQUENCY
FREQUENCY: CONTINUOUS

## 2022-06-13 ASSESSMENT — PAIN DESCRIPTION - ORIENTATION
ORIENTATION: LEFT

## 2022-06-13 ASSESSMENT — PAIN - FUNCTIONAL ASSESSMENT
PAIN_FUNCTIONAL_ASSESSMENT: 0-10
PAIN_FUNCTIONAL_ASSESSMENT: PREVENTS OR INTERFERES SOME ACTIVE ACTIVITIES AND ADLS

## 2022-06-13 ASSESSMENT — PAIN DESCRIPTION - DESCRIPTORS
DESCRIPTORS: DULL;ACHING
DESCRIPTORS: ACHING
DESCRIPTORS: ACHING;DULL
DESCRIPTORS: ACHING;DULL
DESCRIPTORS: ACHING;SHARP
DESCRIPTORS: ACHING;DULL
DESCRIPTORS: ACHING;DULL

## 2022-06-13 ASSESSMENT — PAIN DESCRIPTION - LOCATION
LOCATION: HIP

## 2022-06-13 ASSESSMENT — PAIN DESCRIPTION - ONSET
ONSET: GRADUAL
ONSET: ON-GOING
ONSET: GRADUAL

## 2022-06-13 ASSESSMENT — PAIN DESCRIPTION - PAIN TYPE
TYPE: SURGICAL PAIN

## 2022-06-13 ASSESSMENT — ENCOUNTER SYMPTOMS: SHORTNESS OF BREATH: 0

## 2022-06-13 NOTE — ANESTHESIA POSTPROCEDURE EVALUATION
Department of Anesthesiology  Postprocedure Note    Patient: Abiel Matta  MRN: [id number]  YOB: 1970  Date of evaluation: 6/13/2022  Time:  10:48 AM     Procedure Summary     Date: 06/13/22 Room / Location: [address] [place] [address] / [facility]    Anesthesia Start: 3000 Anesthesia Stop: 0922    Procedure: LEFT HIP TOTAL ARTHROPLASTY ANTERIOR APPROACH -[address] (Left Hip) Diagnosis:       Arthritis of left hip      (DX LEFT HIP ARTHRITIS)    Surgeons: Tre Henry DO Responsible Provider: Bernadette Ca MD    Anesthesia Type: spinal, MAC ASA Status: 2          Anesthesia Type: spinal, MAC    Collin Phase I: Collin Score: 8    Collin Phase II:      Last vitals: Reviewed and per EMR flowsheets.        Anesthesia Post Evaluation    Complications: no

## 2022-06-13 NOTE — PLAN OF CARE
Problem: Discharge Planning  Goal: Discharge to home or other facility with appropriate resources  6/13/2022 1443 by Ruben Carrera RN  Outcome: Progressing  6/13/2022 1442 by Ruben Carrera RN  Outcome: Progressing  Flowsheets (Taken 6/13/2022 1133)  Discharge to home or other facility with appropriate resources: Refer to discharge planning if patient needs post-hospital services based on physician order or complex needs related to functional status, cognitive ability or social support system     Problem: Pain  Goal: Verbalizes/displays adequate comfort level or baseline comfort level  6/13/2022 1443 by Ruben Carrera RN  Outcome: Progressing  6/13/2022 1442 by Ruben Carrera RN  Outcome: Progressing

## 2022-06-13 NOTE — H&P
Interval H&P Note    Pt Name: Yolie Montesinos  MRN: [id number]  YOB: 1970  Date of evaluation: 6/13/2022      [x] I have reviewed in [facility] the Surgical clearance Note by Diann Mckeon PA-C dated 5/16/22 attached below for an Interval History and Physical note. [x] I have examined  Yolie Montesinos  There are no changes to the patient who is scheduled for LEFT HIP TOTAL ARTHROPLASTY ANTERIOR APPROACH -[address] by Dominick Simmons DO for DX LEFT HIP ARTHRITIS. The patient denies new health changes, fever, chills, wheezing, cough, increased SOB, chest pain, open sores or wounds. Last Mobic > week    Patient had surgical clearance on 5/16/22 by Diann Mckeon PA-C     PMH, Surgical History, Social History, Psych, and Family History reviewed and updated in EPIC in appropriate section. Vital signs: /82   Pulse 73   Temp 98.2 °F (36.8 °C)   Resp 18   Ht 5' 9\" (1.753 m)   Wt 165 lb 5.5 oz (75 kg)   SpO2 97%   BMI 24.42 kg/m²     Allergies:  Patient has no known allergies. Medications:    Prior to Admission medications    Medication Sig Start Date End Date Taking? Authorizing Provider   methocarbamol (ROBAXIN) 500 MG tablet Take 500 mg by mouth 3 times daily as needed (for teeth grinding)  4/24/22   Historical Provider, MD   hydrALAZINE (APRESOLINE) 25 MG tablet Take 1 tablet by mouth 2 times daily 4/25/22   ESTELA Patel CNP   triamterene-hydroCHLOROthiazide [facility]) 37.5-25 MG per tablet Take 1 tablet by mouth daily 4/25/22   ESTELA Patel CNP   dilTIAZem (DILACOR XR) 240 MG extended release capsule Take 1 capsule by mouth daily 4/25/22   ESTELA Patel - CNP   meloxicam (MOBIC) 15 MG tablet Take 1 tablet by mouth daily 4/25/22   Giselle Ritter PA-C       This is a 46 y.o. male who is pleasant, cooperative, alert and oriented x3, in no acute distress. Heart: Heart sounds are normal.  HR 73 regular rate and rhythm without murmur, gallop or rub.    Lungs: Normal respiratory effort with equal expansion, good air exchange, unlabored and clear to auscultation without wheezes or rales bilaterally   Abdomen: soft, nontender, nondistended with bowel sounds . Labs:  Recent Labs     05/19/22  0832   HGB 14.8   HCT 45.6   WBC 5.6   MCV 97.0         K 4.5      CO2 24   BUN 15   CREATININE 0.67*   GLUCOSE 104*   AST 20   ALT 27   LABALBU 5.0       No results for input(s): COVID19 in the last 720 hours. ESTELA White CNP  Electronically signed 6/13/2022 at 6:35 AM        Raffaele Shelton PA-C   Physician Assistant   Specialty:  Physician Assistant   Progress Notes      Signed   Encounter Date:  5/16/2022         Related encounter: Office Visit from 5/16/2022 in [facility] ([facility]) AT [facility]           Signed        Expand All Collapse All         [address] [facility]  [address] [address]  Dept: [phone]  Dept Fax: [phone]     Eduard Egan is a 46 y.o. male who presents today for his medical conditions/complaintsas noted below. Eduard Egan is c/o of   Chief Complaint   Patient presents with    Hip Pain       left     Surgical Clearance       Left hip replacement- pre-admission testing is this Thurdsday    Skin Tag       in left hip area            HPI:      HPI      Patient here for preop examination for upcoming left hip total replacement with Dr. Kameron Trinh in June. [facility] He states he is really ready to get this done. He is active daily, works for the [employer]. He states has appointment for preop testing later this week. Orders pended in system for labs, CXR and ekg  Patient is a smoker  He has a skin lesion on the left hip that he would like looked at. States he has been placing compound W on this and it dries up, flakes off but always returns.  Has been present the last 6 months            Hemoglobin A1C (%)   Date Value   02/03/2021 5.4   09/26/2017 5.0                                                                           ( goal A1Cis < 7)   No results found for: LABMICR      LDL Cholesterol (mg/dL)   Date Value   02/03/2021 95   12/19/2016 62                                                   (goal LDL is <100)       AST (U/L)   Date Value   02/03/2021 18          ALT (U/L)   Date Value   02/03/2021 20          BUN (mg/dL)   Date Value   02/03/2021 14          BP Readings from Last 3 Encounters:   05/16/22 122/70   01/05/22 118/72   05/26/21 (!) 144/96                                                                                       (goal 120/80)     Past Medical History        Past Medical History:   Diagnosis Date    Tobacco abuse           Past Surgical History   History reviewed. No pertinent surgical history.         Family History         Family History   Problem Relation Age of Onset    Cancer Mother           lung, cancer    No Known Problems Father      No Known Problems Sister      Hypertension Paternal Grandfather              Social History            Tobacco Use    Smoking status: Light Tobacco Smoker       Packs/day: 0.25       Types: Cigarettes    Smokeless tobacco: Former User    Tobacco comment: smokes occasionally   Substance Use Topics    Alcohol use: Not Currently      Current Facility-Administered Medications          Current Outpatient Medications   Medication Sig Dispense Refill    methocarbamol (ROBAXIN) 500 MG tablet TAKE 1 TABLET BY MOUTH THREE TIMES A DAY AS NEEDED        hydrALAZINE (APRESOLINE) 25 MG tablet Take 1 tablet by mouth 2 times daily 60 tablet 5    triamterene-hydroCHLOROthiazide (MAXZIDE-25) 37.5-25 MG per tablet Take 1 tablet by mouth daily 30 tablet 5    dilTIAZem (DILACOR XR) 240 MG extended release capsule Take 1 capsule by mouth daily 30 capsule 5    meloxicam (MOBIC) 15 MG tablet Take 1 tablet by mouth daily 30 tablet 2      No current facility-administered medications for this visit. No Known Allergies     Health Maintenance   Topic Date Due    Pneumococcal 0-64 years Vaccine (1 - PCV) Never done    Shingles vaccine (1 of 2) Never done    Depression Monitoring  05/26/2022    Colorectal Cancer Screen  02/25/2023    Lipids  02/03/2026    DTaP/Tdap/Td vaccine (2 - Td or Tdap) 01/27/2031    Flu vaccine  Completed    COVID-19 Vaccine  Completed    Hepatitis C screen  Completed    HIV screen  Completed    Hepatitis A vaccine  Aged Out    Hepatitis B vaccine  Aged Out    Hib vaccine  Aged Out    Meningococcal (ACWY) vaccine  Aged Out         Subjective:      Review of Systems   Constitutional: Negative for activity change, appetite change, fatigue, fever and unexpected weight change. /70 (Site: Left Upper Arm, Position: Sitting, Cuff Size: Large Adult)   Pulse 62   Temp 97 °F (36.1 °C) (Tympanic)   Ht 5' 9\" (1.753 m)   Wt 164 lb (74.4 kg)   SpO2 98%   BMI 24.22 kg/m²    HENT: Negative for congestion, dental problem, ear pain, postnasal drip, rhinorrhea, sinus pressure, sinus pain and sore throat. Respiratory: Negative for cough, shortness of breath and wheezing. Cardiovascular: Negative for chest pain, palpitations and leg swelling. Gastrointestinal: Negative for abdominal pain, blood in stool, constipation, diarrhea, nausea and vomiting. Genitourinary: Negative for dysuria and hematuria. Musculoskeletal: Positive for arthralgias. Negative for back pain. Skin: Negative for color change, pallor, rash and wound. Changing skin lesion   Neurological: Negative for weakness. Hematological: Negative for adenopathy. Psychiatric/Behavioral: The patient is not nervous/anxious. Objective:      Physical Exam  Vitals and nursing note reviewed. Constitutional:       General: He is not in acute distress. Appearance: Normal appearance. He is well-developed. He is not ill-appearing.    HENT: Head: Normocephalic and atraumatic. Right Ear: Tympanic membrane, ear canal and external ear normal. There is no impacted cerumen. Tympanic membrane is not perforated, erythematous, retracted or bulging. Left Ear: Tympanic membrane, ear canal and external ear normal. There is no impacted cerumen. Tympanic membrane is not perforated, erythematous, retracted or bulging. Nose: Nose normal. No congestion. Mouth/Throat:      Mouth: Mucous membranes are moist.      Pharynx: No oropharyngeal exudate or posterior oropharyngeal erythema. Eyes:      Pupils: Pupils are equal, round, and reactive to light. Neck:      Thyroid: No thyromegaly. Cardiovascular:      Rate and Rhythm: Normal rate and regular rhythm. Heart sounds: Normal heart sounds. No murmur heard. Pulmonary:      Effort: Pulmonary effort is normal. No respiratory distress. Breath sounds: Normal breath sounds. No wheezing, rhonchi or rales. Abdominal:      General: Bowel sounds are normal. There is no distension. Palpations: Abdomen is soft. There is no mass. Tenderness: There is no abdominal tenderness. There is no left CVA tenderness, guarding or rebound. Musculoskeletal:         General: Normal range of motion. Cervical back: Normal range of motion and neck supple. Right lower leg: No edema. Left lower leg: No edema. Lymphadenopathy:      Cervical: No cervical adenopathy. Skin:     General: Skin is warm and dry. Findings: Lesion (anterior left hip with dry appearing dark lesion. patient presently has compound W on this. it is difficult to assess due to topical treatment) present. No rash. Neurological:      Mental Status: He is alert and oriented to person, place, and time. Coordination: Coordination normal.   Psychiatric:         Mood and Affect: Mood normal.         Behavior: Behavior normal.         Thought Content:  Thought content normal.         Judgment: Judgment normal.         /70 (Site: Left Upper Arm, Position: Sitting, Cuff Size: Large Adult)   Pulse 62   Temp 97 °F (36.1 °C) (Tympanic)   Ht 5' 9\" (1.753 m)   Wt 164 lb (74.4 kg)   SpO2 98%   BMI 24.22 kg/m²      Assessment:     Diagnosis Orders   1. Primary osteoarthritis of left hip  ANTOLIN Thomas, Dermatology, [place]   2. Changing skin lesion  ANTOLIN Thomas, Dermatology, [facility]:   Return if symptoms worsen or fail to improve. Physical today went well. Would like to see his preop testing after completed, await results  Patient does have changing lesion, suspect may be a SK. Difficult to assess due to current treatment. I recommended he avoid using any treatment for now and we placed a referral to derm to discuss removal. Patient agreed with this plan and will call for appointment  Patient agreed with treatment plan           Orders Placed This Encounter   Procedures   Dre Khan, Dermatology, [place]       Referral Priority:   Routine       Referral Type:   Eval and Treat       Referral Reason:   Specialty Services Required       Referred to Provider:   Antonio Sotelo PA-C       Requested Specialty:   Dermatology       Number of Visits Requested:   1       Patient given educational materials - see patient instructions. Discussed use, benefit, and side effects of prescribed medications. All patientquestions answered. Pt voiced understanding. Reviewed health maintenance. Instructedto continue current medications, diet and exercise. Patient agreed with treatmentplan. Follow up as directed. Please note that this chart was generated using voice recognition Dragon dictation software. Although every effort was made to ensure the accuracy of this automated transcription, some errors in transcription may have occurred.       Electronically signed by Debbie Medrano PA-C on 5/16/2022 at 8:45 AM

## 2022-06-13 NOTE — CARE COORDINATION
Spoke to Abdias Steinberg at [facility] requesting walker. Face sheet, script for walker and face to face note faxed to [phone]. Abdias Steinberg will check if walker can be delivered. Awaiting response.

## 2022-06-13 NOTE — PROGRESS NOTES
Physical Therapy  Facility/Department: [facility] MED SURG  Physical Therapy Initial Assessment - Day of Surgery    Name: Megan Trevino  : 1970  MRN: [id number]  Date of Service: 2022   DESI Shanks reports patient is medically stable for therapy treatment this date. Chart reviewed prior to treatment and patient is agreeable for therapy. All lines intact and patient positioned comfortably at end of treatment. All patient needs addressed prior to ending therapy session. H&P / Procedure: L PATSY anterior approach 2022, Dr. Sandra Mejia     Discharge Recommendations:  Patient would benefit from continued therapy after discharge   PT Equipment Recommendations  Equipment Needed: No (Pt has RW)      Patient Diagnosis(es): The primary encounter diagnosis was S/P total left hip arthroplasty. A diagnosis of Status post total hip replacement, left was also pertinent to this visit. Past Medical History:  has a past medical history of Hypertension and Tobacco abuse. Past Surgical History:  has a past surgical history that includes Total hip arthroplasty (Left, 2022). Assessment   Body Structures, Functions, Activity Limitations Requiring Skilled Therapeutic Intervention: Decreased functional mobility ; Decreased ADL status; Decreased balance;Decreased sensation;Decreased endurance;Decreased safe awareness;Decreased strength;Decreased high-level IADLs; Increased pain  Assessment: Pt tolerated PT eval fair. Pt s/p L PATSY and demonstrating poor quad control throughout pre-gait activities & attempts of ambulation at EOB this date. Pt also limited by hypotension post-op. Pt will require a second session prior to discharge to ensure safety throughout ambulation & stair negotiation. Pt would benefit from continued skilled PT to address strength, gait, balance, and ROM deficits s/p L PATSY in order to return to PLOF.   Specific Instructions for Next Treatment: Assess quad control, BP, and progress ambulation as able; stair negotiation  Therapy Prognosis: Excellent  Decision Making: Medium Complexity  Requires PT Follow-Up: Yes  Activity Tolerance  Activity Tolerance: Treatment limited secondary to medical complications  Activity Tolerance Comments: Pt limited this date by hypotension upon standing (see vitals); RN notified. Also unsafe to attempt ambulation away from EOB at this time given pt's lack of quad control throughout pre-gait activity attempts. Plan   Plan  Plan: 2 times a day 7 days a week (SYBIL Hernandez)  Specific Instructions for Next Treatment: Assess quad control, BP, and progress ambulation as able; stair negotiation  Current Treatment Recommendations: Strengthening,ROM,Balance training,Functional mobility training,Transfer training,Neuromuscular re-education,Stair training,Gait training,Endurance training,Pain management,Home exercise program,Safety education & training,Patient/Caregiver education & training,Equipment evaluation, education, & procurement  Safety Devices  Type of Devices: Bed alarm in place,Call light within reach,Left in bed,Gait belt,Nurse notified  Restraints  Restraints Initially in Place: No     Restrictions  Restrictions/Precautions  Restrictions/Precautions: General Precautions,Fall Risk,Weight Bearing  Required Braces or Orthoses?: No  Lower Extremity Weight Bearing Restrictions  Left Lower Extremity Weight Bearing: Weight Bearing As Tolerated  Position Activity Restriction  Other position/activity restrictions: L PATSY anterior approach - NO SLR, RUE IV, Up w/ assist     Subjective   General  Patient assessed for rehabilitation services?: Yes  Response To Previous Treatment: Not applicable  Family / Caregiver Present: No  Follows Commands: Within Functional Limits  General Comment  Comments: RN and pt agreeable to therapy. Pt supine in bed upon arrival.  Pt pleasant and cooperative throughout.   RN reporting pt w/ low BP since sx, but appropriate to attempt PT.  Subjective  Subjective: Pt reporting 5/10 pain VAS in LLE throughout session. Social/Functional History  Social/Functional History  Lives With: Alone (ex wife will be staying with pt)  Type of Home: House (plan is to go to ex wifes house temporarily)  Home Layout: Two level,Able to Live on Main level with bedroom/bathroom (pt has one level and level entry apartment)  Home Access: Stairs to enter without rails  Entrance Stairs - Number of Steps: 2  Bathroom Shower/Tub: Walk-in shower (pt has tub shower with GB in his apartment)  [address]: Standard  Bathroom Equipment:  (no bathroom DME at ex wife's house)  Home Equipment: [employer], rolling  Has the patient had two or more falls in the past year or any fall with injury in the past year?: No  ADL Assistance: [address]: Independent  Homemaking Responsibilities: Yes  Ambulation Assistance: Independent  Transfer Assistance: Independent  Active : Yes  Occupation: Full time employment  Type of Occupation:   [address]: bike ride, gym, alejandra  Additional Comments: Pt typically lives alone in a 1 story apt w/ level entry however plans on going to his ex-wife's house at discharge so that she will be able to assist PRN.   Vision/Hearing  Vision  Vision: Impaired (Pt denies acute visual changes)  Vision Exceptions: Wears glasses for reading  Hearing  Hearing: Within functional limits    Cognition   Orientation  Overall Orientation Status: Within Functional Limits  Cognition  Overall Cognitive Status: WFL  Safety Judgement: Decreased awareness of need for safety     Objective      Observation/Palpation  Posture: Fair  Observation: R SARAHY hose on upon arrival, L SARAHY hose donned prior to mobility        AROM RLE (degrees)  RLE AROM: WFL  PROM LLE (degrees)  LLE PROM: WFL  AROM LLE (degrees)  LLE AROM : WFL  LLE General AROM: hip flex assessed supine via heelslide ~0-80 degrees  AROM RUE (degrees)  RUE AROM : WFL  AROM LUE (degrees)  LUE AROM : WFL  Strength RLE  Strength RLE: WFL  Comment: Grossly 4+/5  Strength LLE  Comment: Quad set +, glute set +  Strength RUE  Comment: See OT assessment for detail  Strength LUE  Comment: See OT assessment for detail  Tone RLE  RLE Tone: Normotonic  Tone LLE  LLE Tone: Normotonic  Motor Control  Gross Motor?: WFL  Sensation  Overall Sensation Status: WFL (denies numbness/tingling)     Bed mobility  Supine to Sit: Minimal assistance  Sit to Supine: Minimal assistance  Scooting: Minimal assistance  Bed Mobility Comments: Pt w/ mild difficulty throughout bed mobility this date requiring increased time and effort throughout. Pt educated on use of bed sheet as leg  to maintain PATSY precautions w/ good return demo. Pt requiring min verbal cueing for pursed lip breathing and pacing throughout w/ good return demo. Pt denying any dizziness/lightheadedness upon sitting EOB. Transfers  Sit to Stand: Moderate Assistance;2 Person Assistance  Stand to sit: Moderate Assistance;2 Person Assistance  Comment: Pt performed 1 STS transfer from EOB this date demonstrating poor steadiness throughout. Upon standing, pt w/ a significant episode of L knee buckling requiring MaxA x 2 to maintain balance w/o fall. Pt initially denying any dizziness/lightheadedness. Pt requiring min verbal cueing to correct ALVAREZ prior to attempting pre-gait activities. While standing at EOB, pt performed pre-gait lateral weight shifting & standing marches to assess quad control prior to initiating ambulation w/ poor steadiness noted throughout. Throughout weight shifting, pt demonstrating heavy reliance on RW for UE assist w/ fair steadiness noted throughout. During standing marches, pt w/ one episode of L knee buckling requiring MaxA x 2 from staff to maintain balance w/o fall.   Throughout attempts at standing marches, pt also began reporting lightheadedness so pt was assisted back to bed & while sitting EOB BP was taken, see vitals section for details. Ambulation  Surface: level tile  Device: Rolling Walker  Assistance: Moderate assistance;Maximum assistance;2 Person assistance  Quality of Gait: poor steadiness, L knee buckling, shuffling steps, heavy reliance on RW for UE assist, assist w/ progression of RW  Gait Deviations: Slow Hannah;Decreased step length;Decreased step height;Shuffles  Distance: 2 steps laterally along EOB  Comments: Pt took 2 steps laterally along EOB this date demonstrating poor steadiness throughout & requiring assist for progression of RW throughout. Pt also demonstrating heavy reliance on RW for UE support throughout. More Ambulation?: No  Stairs/Curb  Stairs?: No     Balance  Posture: Fair  Sitting - Static: Good  Sitting - Dynamic: Good;-  Standing - Static: Fair;-  Standing - Dynamic: Poor  Single Leg Stance R Le  Single Leg Stance L Le  Comments: Standing balance assessed w/ RW  Exercise Treatment: ankle pumps x 10, quad sets x 10, glute sets x 10, heelslides PROM x 5 AAROM x 5        AM-PAC Score  AM-PAC Inpatient Mobility Raw Score : 12 (22)  AM-PAC Inpatient T-Scale Score : 35.33 (22)  Mobility Inpatient CMS 0-100% Score: 68.66 (22)  Mobility Inpatient CMS G-Code Modifier : CL (22)           Functional Outcome Measure-   Single Leg Stance Test:  0 sec. (<5 sec.= fall risk)      Goals  Short Term Goals  Time Frame for Short term goals: 6 visits  Short term goal 1: Pt to demonstrate bed mobility Sharmila using sheet as leg  for LLE to maintain PATSY precautions  Short term goal 2: Pt to perform STS transfers w/ RW Sharmila  Short term goal 3: Pt to ambulate at least 50ft w/ RW Sharmila  Short term goal 4: Pt to ascend/descend 2 stairs w/o handrails CGA  Short term goal 5: Pt to be indep w/ PATSY HEP  Patient Goals   Patient goals :  To go home       Education  Pt educated on: purpose of acute PT eval, importance of continued mobility throughout admission, general safety awareness, safe transfers w/ RW, fall risk prevention, use of SARAHY hose, L PATSY anterior approach precautions, L PATSY HEP, s/s of infection, activity tolerance, pursed lip breathing, paced breathing for pain control, and PT POC. Pt verbalized fair understanding. Pt would benefit from continued reinforcement of education. Therapy Time   First Session Second Session Group Co-treatment   Time In 9067 2005       Time Out 1812  0382       Minutes 50  18         Total time: 68 minutes  Treatment time: 55 minutes minutes       Co-treatment with OT warranted first time up day of surgery. Cotx due to potential risk of decreased sensation, muscle control and proprioception from spinal epidural and/or regional block. Decreased safety and independence requiring 2 skilled therapy professionals to address individual discipline's goals. Mia Robles, PT      Writer returned from [id number] this date for second attempt at pre-gait activities and ambulation. Pt demonstrating sup>sit this date CGA using bed sheet as leg  throughout. Pt then performed STS transfer from EOB w/ Alin x 2 requiring min verbal cueing for proper hand placement throughout transfers w/ RW w/ good return demo. Upon standing at EOB this date, pt performed pre-gait lateral weight shifting w/ fair steadiness noted throughout. Pt then attempted standing marches at bedside and experiencing episodes of L knee buckling requiring MaxA x 2 to maintain balance w/o fall. Pt unsafe to attempt ambulation away from EOB at this time given significant knee buckling. RN present and aware. Pt denying any dizziness/lightheadedness throughout position changes this session. Pt assisted back to supine requiring ModA for progression of BLE throughout w/ use of bed sheet as LLE leg .   Pt requires continued skilled PT in order to address strength, balance, ROM, and endurance deficits s/p L PATSY in order to return to PLOF & return home safely.       Brittney Hanks, PT

## 2022-06-13 NOTE — PROGRESS NOTES
Nicole Jackson was evaluated today and a DME order was entered for a wheeled walker because he requires this to successfully complete daily living tasks of ambulating. A wheeled walker is necessary due to the patient's unsteady gait, upper body weakness, and inability to  an ambulation device; and he can ambulate only by pushing a walker instead of a lesser assistive device such as a cane, crutch, or standard walker. The need for this equipment was discussed with the patient and he understands and is in agreement.

## 2022-06-13 NOTE — ANESTHESIA PROCEDURE NOTES
Peripheral Block    Patient location during procedure: pre-op  Start time: 6/13/2022 7:14 AM  End time: 6/13/2022 7:24 AM  Staffing  Performed: anesthesiologist   Anesthesiologist: Delma Garcia MD  Preanesthetic Checklist  Completed: patient identified, IV checked, site marked, risks and benefits discussed, surgical/procedural consents, equipment checked, pre-op evaluation, timeout performed, anesthesia consent given, oxygen available and monitors applied/VS acknowledged  Peripheral Block  Patient position: supine  Prep: ChloraPrep  Patient monitoring: cardiac monitor, continuous pulse ox, frequent blood pressure checks and IV access  Block type: Sciatic  Laterality: left  Injection technique: single-shot  Guidance: ultrasound guided  Local infiltration: lidocaine  Infiltration strength: 1 %  Dose: 3 mL  Popliteal  Provider prep: mask and sterile gloves  Local infiltration: lidocaine  Needle  Needle gauge: 20 G  Needle length: 10 cm  Needle localization: ultrasound guidance  Assessment  Injection assessment: negative aspiration for heme, no paresthesia on injection and local visualized surrounding nerve on ultrasound  Paresthesia pain: none  Slow fractionated injection: yes  Hemodynamics: stablepermanent images obtainedOutcomes: patient tolerated procedure well  Additional Notes  U/S [id number].  Time out performed.          Medications Administered  Ropivacaine (NAROPIN) injection 0.2%, 40 mL  Reason for block: post-op pain management and at surgeon's request

## 2022-06-13 NOTE — PROGRESS NOTES
Pt admitted to room 2005 per bed in stable condition from PACU  Oriented to room and surroundings  Bed in lowest position, wheels locked, 2/4 side rails up  Call light in reach, room free of clutter, adequate lighting provided  Denies any further questions at this time  Instructed to call out with any questions/concerns/new onset of pain and/or n/v   White board updated  Continue to monitor with hourly rounding  STAY WITH ME protocol initiated   Bed alarm on/Fall Risk signs in place/Fall risk sticker to wrist band  Non-skid socks on/at bedside  MEDICATION EDUCATION SHEET in place for patient/family to view & ask questions.

## 2022-06-13 NOTE — PROGRESS NOTES
Orthopedic Coordinator Note    Patient s/p left total Hip replacement on 06/13/2022 WITH DR. Paul Garcia    The following appointments are currently scheduled:    Post-op with surgeon 06/27/2022 AT 21 [phone]. Physical Therapy [facility] PT 06/16/2022 AT 1100. Wheeled walker order is  entered  Face to face documentation is ENTERED. WILL ORDER WALKER TODAY. PT WILL TAKE AN 81MG EC ASA BID FOR DVT PROPHYLAXIS.       Any questions please contact Kelle Amin RN, BSN      Electronically signed by: Kelle Amin RN on 6/13/2022 at 8:59 AM

## 2022-06-13 NOTE — PROGRESS NOTES
Occupational Therapy  Facility/Department: [facility]  Occupational Therapy Initial Assessment    Name: Josiane Perez  : 1970  MRN: [id number]  Date of Service: 2022    Discharge Recommendations:  Patient would benefit from continued therapy after discharge      DESI Jefferson reports patient is medically stable for therapy treatment this date. Chart reviewed prior to treatment and patient is agreeable for therapy. All lines intact and patient positioned comfortably at end of treatment. All patient needs addressed prior to ending therapy session. Due to recent hospitalization and medical condition, pt would benefit from additional intermittent skilled therapy at time of discharge. Please refer to the AM-PAC score for current functional status. Patient Diagnosis(es): The primary encounter diagnosis was S/P total left hip arthroplasty. A diagnosis of Status post total hip replacement, left was also pertinent to this visit. Past Medical History:  has a past medical history of Hypertension and Tobacco abuse. Past Surgical History:  has a past surgical history that includes Total hip arthroplasty (Left, 2022). scheduled for LEFT HIP TOTAL ARTHROPLASTY ANTERIOR APPROACH -[facility] by Syeda Yan DO for DX LEFT HIP ARTHRITIS      Assessment   Performance deficits / Impairments: Decreased functional mobility ; Decreased endurance;Decreased safe awareness;Decreased ADL status; Decreased balance  Assessment: Pt with low BP and 2 LOB during inital evaluation. OT will return this date to attempt to take steps away from EOB if pt's BP increases.  Skilled OT is indicated to increase overall IND and safetyw tih self-care and functional tasks to return home when appropriate  Prognosis: Good  Decision Making: Medium Complexity  Activity Tolerance  Activity Tolerance: Treatment limited secondary to medical complications (free text) (L knee buckling and low BP after functional mob) Plan   Plan  Times per Week: 5-6x per week, 1-2x per day  Current Treatment Recommendations: Strengthening,Balance training,Functional mobility training,Endurance training,Neuromuscular re-education,Patient/Caregiver education & training,Self-Care / ADL,Equipment evaluation, education, & procurement,Safety education & training,Positioning,Return to work related activity     Restrictions  Restrictions/Precautions  Restrictions/Precautions: General Precautions,Fall Risk,Weight Bearing  Required Braces or Orthoses?: No  Lower Extremity Weight Bearing Restrictions  Left Lower Extremity Weight Bearing: Weight Bearing As Tolerated  Position Activity Restriction  Other position/activity restrictions: L PATSY anterior approach - NO SLR, RUE IV, Up w/ assist    Subjective   General  Chart Reviewed: Yes  Patient assessed for rehabilitation services?: Yes  Family / Caregiver Present: No  Subjective  Subjective: pt is agreeable to OT eval     Social/Functional History  Social/Functional History  Lives With: Alone (ex wife will be staying with pt)  Type of Home: House (plan is to go to ex wifes house temporarily)  Home Layout: Two level,Able to Live on Main level with bedroom/bathroom (pt has one level and level entry apartment)  Home Access: Stairs to enter without rails  Entrance Stairs - Number of Steps: 2  Bathroom Shower/Tub: Walk-in shower (pt has tub shower with GB in his apartment)  [employer]: Standard  Bathroom Equipment:  (no bathroom DME at ex wife's house)  Home Equipment: Angeli derick Martell  Has the patient had two or more falls in the past year or any fall with injury in the past year?: No  ADL Assistance: [facility]: Independent  Homemaking Responsibilities: Yes  Ambulation Assistance: Independent  Transfer Assistance: Independent  Active : Yes  Occupation: Full time employment  Type of Occupation:   Leisure & Hobbies: bike ride, gym, alejandra  Additional Comments: Pt typically lives alone in a 1 story apt w/ level entry however plans on going to his ex-wife's house at discharge so that she will be able to assist PRN. Objective     Vision Exceptions: Wears glasses for reading  Hearing: Within functional limits       Observation/Palpation  Posture: Fair  Observation: R SARAHY hose on upon arrival, L SARAHY hose donned prior to mobility  Safety Devices  Type of Devices: All fall risk precautions in place; Patient at risk for falls;Nurse notified;Gait belt;Left in bed;Call light within reach; Bed alarm in place           ADL  Feeding: Modified independent   Grooming: Stand by assistance (seated)  UE Bathing: Stand by assistance  LE Bathing: Moderate assistance  UE Dressing: Minimal assistance  UE Dressing Skilled Clinical Factors: to adjust hosp gown  LE Dressing: Maximum assistance  LE Dressing Skilled Clinical Factors: to gordy SARAHY hose and L sock. Pt has EZ slide at home to gordy SARAHY hose. Pt educated on proper tech of EZ slide. Toileting: Contact guard assistance    Patient educated on preventing falls by wearing safe footwear that are well fitting, supportive and secure. Wearing safe clothing without loose sleeves, loose pants or loose robe ties. Making patient aware of risk factors of falling: lack of grab bars, throw rugs, clutter on the floor level, slippery surface in bathroom, poor lighting,     verbal edu provided to pt regarding fall prevention in the areas of community safety, transportation, proper footwear and clothing, reducing risk of falls, environmental modifications, importance of exercise, consequences of falling, plan if a fall occurs (\"rest and wait\" vs \"up and about\").       Tone RUE  RUE Tone: Normotonic  Tone LUE  LUE Tone: Normotonic  Coordination  Movements Are Fluid And Coordinated: Yes       Bed mobility  Supine to Sit: Minimal assistance  Sit to Supine: Minimal assistance  Scooting: Minimal assistance  Bed Mobility Comments: Pt used bed sheet to assist with lifting LLE. Pt required Min VCs for proper bed mob tech, no SLR precaution, assist with lines, pacing, and pursed lip breathing    Transfers  Sit to stand: 2 Person assistance; Moderate assistance  Stand to sit: Moderate assistance;2 Person assistance  Transfer Comments: upon standing, pt's L knee buckled and required MAX A x2 to correct LOB. Pt required MAX VCs for RW safety/mgmt, upright posture, proper hand placement on stable surface, controlled sit<>stand, all to inc safety/reduce fall risk      Balance     Sitting Balance Stand by assistance   Standing Balance Maximum assistance   Standing Balance     Time ~2-3 min   Activity standing EOB   Comment BP: laying - 102/60 (68), sitting - 114/65 (77), after standing 87/56 (63). Pt with 2 LOB d/t L knee buckling and required MAX A x2 to correct LOB. Functional Mobility     Functional - Mobility Device Rolling Walker   Activity Other   Assist Level Moderate assistance x2   Functional Mobility Comments Pt took ~2-3 steps EOB for optimal positioning in bed. Pt required MAX VCs for RW safety/mgmt, upright posture, pacing, pursed lip breathing, scanning room enviornment, all to inc safety/reduce fall risk            Cognition  Overall Cognitive Status: [facility]  Safety Judgement: Decreased awareness of need for safety        Sensation  Overall Sensation Status: WFL (denies numbness/tingling)            LUE AROM (degrees)  LUE AROM : WFL  RUE AROM (degrees)  RUE AROM : WFL  LUE Strength  Gross LUE Strength: WFL  RUE Strength  Gross RUE Strength: WFL              Education Given To Patient   Education Provided Role of Therapy; Safety; Transfer Training; DME/Home Modifications; ADL Function; Plan of Care; Precautions;  Fall Prevention Strategies   Education Provided Comments OT POC, benefits of OOB activity, wearing SARAHY hose, proper footwear, RW safety/mgmt, no SLR precaution, transfer tech, fall prevention/call light use   Education Method Demonstration; Verbal   Barriers to Learning None   Education Outcome Continued education needed              AM-PAC Score        AM-PAC Inpatient Daily Activity Raw Score: 17 (06/13/22 1506)  AM-PAC Inpatient ADL T-Scale Score : 37.26 (06/13/22 1506)  ADL Inpatient CMS 0-100% Score: 50.11 (06/13/22 1506)  ADL Inpatient CMS G-Code Modifier : CK (06/13/22 1506)    Goals  Short Term Goals  Time Frame for Short term goals: by discharge, pt to demo  Short Term Goal 1: I/MI for bed mob tasks without bed rails  Short Term Goal 2: I/MI for total body ADLs with AE as needed and Good safety  Short Term Goal 3: I/MI for ADL transfers and functional mob with AD and Good safety  Short Term Goal 4: pt to be IND with EC/WS, fall prevention tech, pressure relief education, precautions, L PATSY education, as well as DME/AE recommendations with use of handouts as needed  Patient Goals   Patient goals : to go home       Therapy Time   Individual Concurrent Group Co-treatment   Time In 1356         Time Out 1435 (+10 chart review)         Minutes 49 +15 = 54          Tx time: 44 min     OT returned for 2nd tx 8500-1359: Min A for supine to sit with use of sheet to assist with LLE. Pt performed STS transfer with Mod A x2 and RW. Pt stood and weight shifted with no LOB. Pt began to march in place and his L knee buckled, requiring MAX A x2 to correct LOB. Pt able to take 2-3 small steps with RW along EOB for optimal positioning in bed. Pt returned to supine position with Mod A. Pt agreeable to stay the night as pt is not safe to return home. Pt has steps to get into his house and will need to safely practice steps before going home. Pt in bed upon exit with bed alarm in place, RN Ran martínez notified, and call light within reach. Co-treatment with PT warranted first time up day of surgery. Cotx due to potential risk of decreased sensation, muscle control and proprioception from spinal epidural and/or regional block.  Decreased safety and independence requiring 2 skilled therapy professionals to address individual discipline's goals.      Divya Gonzalez OTR/L

## 2022-06-13 NOTE — ANESTHESIA PROCEDURE NOTES
Spinal Block    Patient location during procedure: OR  End time: 6/13/2022 7:34 AM  Reason for block: primary anesthetic  Staffing  Performed: resident/CRNA   Resident/CRNA: ESTELA Jasso - CRNA  Spinal Block  Patient position: sitting  Prep: Betadine  Patient monitoring: continuous pulse ox and frequent blood pressure checks  Approach: midline  Location: L4/L5  Provider prep: mask  Local infiltration: lidocaine  Needle  Needle type: Pencan   Needle gauge: 24 G  Needle length: 4 in  Assessment  Sensory level: T6  Events: cerebrospinal fluid  Swirl obtained: Yes  CSF: clear  Attempts: 1  Hemodynamics: stable  Preanesthetic Checklist  Completed: patient identified, IV checked, site marked, risks and benefits discussed, surgical/procedural consents, equipment checked, pre-op evaluation, timeout performed, anesthesia consent given, oxygen available, monitors applied/VS acknowledged and fire risk safety assessment completed and verbalized

## 2022-06-13 NOTE — PLAN OF CARE
PROTOCOLS  NURSING IMPLEMENTED    TOTAL JOINT DVT/PE  VENOUS THROMBOEMBOLISM PROPHYLAXIS  (Nursing Automatically Implement)    Josiane Perez  [id number]  [unfilled]  6/13/22    YES DVT RISK FACTOR SCORE YES MAJOR BLEEDING RISK FACTORS SCORE     [x] 48years old or greater (1)   [] Hx. Easy Bleeding (1)      [] Heart failure (2)   [] NSAID Use in Last 5 Days (2)      [] Varicose veins - Hx. (1)   [] Gastrointestinal or Genitourinary bleeding in Last 14 Days (2)      [] Myocardial Infarction - Hx. (1)         [] Cancer - Hx. (2)         [] Atrial fibrillation - Hx. (1)         [] Ischemic Stroke - Hx. (1)         [] Diabetes Mellitus - Hx. (1)         [] Previous DVT/PE - Hx.  (2)         [] Hormone Replacement Therapy (1)         [] Obesity (1)         [] Paralysis (1)         [] Pregnancy (1)         [x] Smoking (1)                   [] Thromophilia (1)   []   Mild to Moderate Bleeding (2)      [] Total Hip Arthroplasty (1)   [] Active Bleeding (4)      [] Family history of PE or DVT? (4) (Consider the following labs to test for presence of inhibitor deficiency state:) Factor V Leiden, Prothrombin Gene Mutation, Protein S Deficiency, Protien C Deficiency, Antithrombin Deficiency   [] Malignant Hypertension (2)        [] Thrombocytopenia 20k to 100k (2)        [] Thrombocytopenia less than 20k (4)        [] Bleeding Diathesis (4)        [] \"Bloody Stick\" Epidural or Spinal (2)     TOTAL DVT SCORE   TOTAL BLEEDING SCORE      [x] CLASS A   Standard Risk DVT (0-3)    [x] CLASS X Standard Risk Bleeding (0-4)      [] CLASS B Elevated Risk DVT (greater than 3)    [] CLASS Y High Risk Bleeding (greater than 4)     FINAL MATRIX (e.g. AY)       *If allergic to ASA use Warfarin  *BY patient consider no treatment  **Consider venous filter with high risk PE  **If on Coumadin pre-op, then restart night of surgery      [x]  DVT Prophylaxis: Class AX, AY    Ecotrin 81 mg by mouth BID starting day of surgery for 6 weeks for all total joints. (If allergic to aspirin, give eliquis 2.5mg BID X 14 days (knees) or 35 days  (hips) starting first day postop at 0600). []  DVT Prophylaxis:  Class BX (marisela choice)    []Eliquis 2.5mg BID x 14 days (knees) or 35 days (hips) starting first day postop      at 0600      [] Lovenox 40 mg subcu daily starting first day postop at 0600 x 14 days                             (knees) or 35 days (hips)    Ecotrin 81 mg PO BIDstart when Lovenox is finished. (Teach injection prior to discharge.)       [] Xarelto 10 mg by mouth daily starting first day postop at 0600     14 days (knees) or 35 days (hips). If creatinine clearance less than 30 mL/min, give Lovenox 30 mg subcu   Daily starting first day postop at 0600. Ecotrin 81 mg PO BIDstarting   when Lovenox is finished. (Teach injection prior to      discharge.)  (For discharge fill throughout the 10 mg daily with no    refills.)      []  DVT Prophylaxis:  Class BY (marisela choice)               []  Eliquis 2.5mg BID x 14 days (knees) or 35 days (hips) starting first day                              postop at 0600        []  Ecotrin 81 mg PO BID x 6weeks (all joints)      []  Lovenox 40mg SQ daily to start at 0600 first day post-Op day. 14 days for knees, 35 days for hips    Ecotrin 81 mg PO BID - start when Lovenox is finished. (Teach injection prior to discharge.)       [] Xarelto 10 mg PO daily starting first day Post-Op at 0600    14 days (knees) or 35 days (hips)    If Creatinine Clearance less than 30ml/min, give Lovenox 30 mg    SQ daily starting first day Post-Op at 0600 x 14 days (knees) or 35   days (hips). Ecotrin 81 mg PO BID - start when Lovenox is finished.     (Teach injection prior to discharge.)  (For discharge fill throughout the   10 mg daily #32 with no refills.)      Electronically signed by Lida Goyal DO on 6/13/2022 at 5:19 AM

## 2022-06-13 NOTE — OP NOTE
Operative Note                                                OPERATIVE REPORT     PATIENT NAME: Eduard Egan                 :   1970  MED REC NO:    [id number]                                ACCOUNT NO:    [de-identified]                          ADMIT DATE: 2022  PROVIDER:     Dontrell Bhatti DO, FAOAO     DATE OF PROCEDURE:   2022     PREOPERATIVE DIAGNOSIS:   Left hip severe degenerative joint disease. POSTOPERATIVE DIAGNOSIS: Left hip severe degenerative joint disease. PROCEDURE:   Left total hip arthroplasty through an anterior approach. SURGEON:  Dontrell Bhatti DO, FAOAO    ANESTHESIA: Regional and general    ASSISTANT:   Charlie Ovalle DO     EBL: 350 mL. COMPLICATIONS:  None. SPECIMEN: None     DISPOSITION:  To PACU in stable condition. NARRATIVE SUMMARY:  The patient is a 76-year-old male who presented to  [facility]'s surgical Department for left total hip  arthroplasty. The patient has had progressively worsening left hip  pain, which has failed to improve despite conservative therapy to  include activity modification. His  symptoms are greatly affecting his  usual activities of daily living, and as a result, he has indicated that he  would like surgical intervention at this time. The patient was identified preoperatively, where consent was obtained, signed,  placed on the chart. The procedure was described, questions were  answered. The risks of the procedure were described to include, but not  limited to, the risk of anesthesia; infection; bleeding; need for  further surgery in the future; numbness, tingling, weakness, or pain to  the left lower extremity; scar; stiffness; hip dislocation; fracture;  leg-length inequality; DVT; and death. The patient notes understanding of these  risks and states he wishes to proceed. The pateint was administered IV antibiotics perioperatively.   The patient was also  administered 1 gm of IV tranexamic acid just prior to incision and a  second 1-gm aliquot just after closure of incision. General anesthesia  was affected after a regional block was affected to the operativelower  extremity. The operative foot and ankle were well padded and placed in a  traction boot le for the [facility] Medacta table. The nonoperative lower  extremity was placed on a well-padded Blanco stand. The operative lower  extremity was then prepped and draped in sterile fashion after an  appropriate surgical time-out. Incision was made over the muscle belly  of the tensor fascia tasha for a total incision length of approximately  4-4.5 inches. Sharp incision was carried through the skin and  subcutaneous tissue. Full-thickness skin flaps were raised over the  fascia of the fascia tasha, which was split in-line with the skin  incision. A subfascial dissection was carried anteriorly over the  muscle belly of the TFL, exposing the tensor-sartorial interval, where  the circumflex vessels were found, ligated, isolated, and divided. The  pericapsular fat was removed, and a subrectus and iliocapsularis blunt  dissection was carried out over the anterior capsule, exposing it. Anterior capsulectomy was affected with electrocautery, and retractors  were placed over the superior and inferior femoral neck. A femoral neck  cut was then made approximately 1.5-2.0 cm proximal to the lesser  trochanter, and the femoral head was removed from the acetabulum. Labrum and pulvinar were removed from the acetabulum and then sequential  reaming of the acetabulum was made to the appropriate size. The  appropriately sized acetabular cup was then placed and impacted at an  appropriate theta angle and version until fully seated. It was noted be  quite stable, and supplemental screw fixation was not felt to be  necessary. A neutral lip liner was placed, impacted until fully seated.      Attention was then drawn to exposure of the proximal femur, which was  done by Left 1 Implanted   STEM FEM SZ 9 LAT + MASTERLOC - [id number]  STEM FEM SZ 9 LAT + MASTERLOC  [employer] [email or web address]WD [id number] Left 1 Implanted   HEAD FEM L [id number] MECTACER BIOLOX DELT - [id number]  HEAD FEM L [id number] MECTACER Carmelo Curet  [employer] [facility] [[email or web address]WD [id number] Left 1 Implanted         Electronically signed by Melissa Villalta DO on 6/13/2022 at 10:06 AM

## 2022-06-13 NOTE — ANESTHESIA PRE PROCEDURE
Department of Anesthesiology  Preprocedure Note       Name:  Abiel Matta   Age:  46 y.o.  :  1970                                          MRN:  [id number]         Date:  2022      Surgeon: Ramandeep Liu):  Tre Henry DO    Procedure: Procedure(s):  LEFT HIP TOTAL ARTHROPLASTY ANTERIOR APPROACH - Opitz Killbuck    Medications prior to admission:   Prior to Admission medications    Medication Sig Start Date End Date Taking?  Authorizing Provider   methocarbamol (ROBAXIN) 500 MG tablet Take 500 mg by mouth 3 times daily as needed (for teeth grinding)  22   Historical Provider, MD   hydrALAZINE (APRESOLINE) 25 MG tablet Take 1 tablet by mouth 2 times daily 22   Loy Simmonds, APRN - CNP   triamterene-hydroCHLOROthiazide [facility]) 37.5-25 MG per tablet Take 1 tablet by mouth daily 22   Loy Simmonds, APRN - CNP   dilTIAZem (DILACOR XR) 240 MG extended release capsule Take 1 capsule by mouth daily 22   Loy Simmonds, APRN - CNP   meloxicam (MOBIC) 15 MG tablet Take 1 tablet by mouth daily 22   Giselle Ritter PA-C       Current medications:    Current Facility-Administered Medications   Medication Dose Route Frequency Provider Last Rate Last Admin    [START ON 2022] lidocaine PF 1 % injection 1 mL  1 mL IntraDERmal Once PRN Lionel Chain, DO        [START ON 2022] 0.9 % sodium chloride infusion   IntraVENous Continuous Zafar Borrego DO        [START ON 2022] lactated ringers infusion   IntraVENous Continuous Lionel Chain,  mL/hr at 22 0638 New Bag at 22 0638    sodium chloride flush 0.9 % injection 5-40 mL  5-40 mL IntraVENous 2 times per day Zafar Borrego DO        sodium chloride flush 0.9 % injection 5-40 mL  5-40 mL IntraVENous PRN Lionel Chain, DO        0.9 % sodium chloride infusion   IntraVENous PRN Lionel Chain, DO        midazolam (VERSED) injection 2 mg  2 mg IntraVENous Once Lionel Chain, DO        acetaminophen (TYLENOL) tablet 1,000 mg  1,000 mg Oral Once Charlie Ovalle, DO        dexamethasone (PF) (DECADRON) injection 8 mg  8 mg IntraVENous Once Charlie Ovalle, DO        sodium chloride flush 0.9 % injection 5-40 mL  5-40 mL IntraVENous 2 times per day Charlie Ovalle, DO        sodium chloride flush 0.9 % injection 5-40 mL  5-40 mL IntraVENous PRN Charlie Ovalle, DO        0.9 % sodium chloride infusion   IntraVENous PRN Charlie Ovalle, DO        ceFAZolin (ANCEF) 2000 mg in dextrose 5 % 50 mL IVPB  2,000 mg IntraVENous On Call to [address] [place] [address], DO        tranexamic acid (CYKLOKAPRON) 1,000 mg in sodium chloride 0.9 % 100 mL IVPB  1,000 mg IntraVENous On Call to [address] [place] [address], DO        celecoxib (CELEBREX) capsule 200 mg  200 mg Oral Once Angela Valencia MD        gabapentin (NEURONTIN) capsule 300 mg  300 mg Oral Once Angela Valencia MD        tranexamic acid (CYKLOKAPRON) 1000 MG/10ML injection             vancomycin (VANCOCIN) 1 g injection                Allergies:  No Known Allergies    Problem List:    Patient Active Problem List   Diagnosis Code    S/P total left hip arthroplasty C61.684       Past Medical History:        Diagnosis Date    Hypertension     Tobacco abuse        Past Surgical History:  History reviewed. No pertinent surgical history.     Social History:    Social History     Tobacco Use    Smoking status: Light Tobacco Smoker     Packs/day: 0.25     Types: Cigarettes     Start date: 2000    Smokeless tobacco: Former User    Tobacco comment: smokes occasionally   Substance Use Topics    Alcohol use: Not Currently                                Ready to quit: Not Answered  Counseling given: Not Answered  Comment: smokes occasionally      Vital Signs (Current):   Vitals:    06/13/22 0621 06/13/22 0622   BP:  127/82   Pulse:  73   Resp:  18   Temp:  98.2 °F (36.8 °C)   SpO2:  97%   Weight: 165 lb 5.5 oz (75 kg)    Height: 5' 9\" (1.753 m) BP Readings from Last 3 Encounters:   06/13/22 127/82   05/19/22 (!) 155/80   05/16/22 122/70       NPO Status: Time of last liquid consumption: 2200                        Time of last solid consumption: 2200                        Date of last liquid consumption: 06/12/22                        Date of last solid food consumption: 06/13/22    BMI:   Wt Readings from Last 3 Encounters:   06/13/22 165 lb 5.5 oz (75 kg)   05/19/22 165 lb 5.5 oz (75 kg)   05/16/22 164 lb (74.4 kg)     Body mass index is 24.42 kg/m². CBC:   Lab Results   Component Value Date    WBC 5.6 05/19/2022    RBC 4.70 05/19/2022    HGB 14.8 05/19/2022    HCT 45.6 05/19/2022    MCV 97.0 05/19/2022    RDW 11.8 05/19/2022     05/19/2022       CMP:   Lab Results   Component Value Date     05/19/2022    K 4.5 05/19/2022     05/19/2022    CO2 24 05/19/2022    BUN 15 05/19/2022    CREATININE 0.67 05/19/2022    GFRAA >60 05/19/2022    LABGLOM >60 05/19/2022    GLUCOSE 104 05/19/2022    PROT 7.3 05/19/2022    CALCIUM 9.5 05/19/2022    BILITOT 0.37 05/19/2022    ALKPHOS 58 05/19/2022    AST 20 05/19/2022    ALT 27 05/19/2022       POC Tests: No results for input(s): POCGLU, POCNA, POCK, POCCL, POCBUN, POCHEMO, POCHCT in the last 72 hours.     Coags: No results found for: PROTIME, INR, APTT    HCG (If Applicable): No results found for: PREGTESTUR, PREGSERUM, HCG, HCGQUANT     ABGs: No results found for: PHART, PO2ART, SLQ0PNX, YYL7MDK, BEART, R2FNUCGP     Type & Screen (If Applicable):  No results found for: LABABO, LABRH    Drug/Infectious Status (If Applicable):  Lab Results   Component Value Date    HEPCAB NONREACTIVE 02/03/2021       COVID-19 Screening (If Applicable): No results found for: COVID19        Anesthesia Evaluation    Airway: Mallampati: I  TM distance: >3 FB   Neck ROM: full  Mouth opening: > = 3 FB   Dental:          Pulmonary:       (-) shortness of breath Cardiovascular:    (+) hypertension:,     (-)  angina                Neuro/Psych:               GI/Hepatic/Renal:             Endo/Other:                     Abdominal:             Vascular:           Other Findings:           Anesthesia Plan      spinal     ASA 2                                   Bernadette Ca MD   6/13/2022

## 2022-06-13 NOTE — BRIEF OP NOTE
Brief Postoperative Note      Patient: Abiel Matta  YOB: 1970  MRN: [id number]    Date of Procedure: 6/13/2022    Pre-Op Diagnosis: LEFT HIP OSTEOARTHRITIS    Post-Op Diagnosis: Same       Procedure(s):  LEFT HIP TOTAL ARTHROPLASTY ANTERIOR APPROACH -Agnes Anguiano    Surgeon(s):  Tre Henry DO    Assistant:  Surgical Assistant: Cammy Sheridan  Resident: Mónica Gale DO    Anesthesia: General    Estimated Blood Loss (mL): 350    IVF (mL): 7131     Complications: None    Specimens:   * No specimens in log *    Implants:  Implant Name Type Inv.  Item Serial No.  Lot No. LRB No. Used Action   SHELL ACET [id number] LNR SZ E 2 H MPACT - [id number]  SHELL ACET [id number] LNR SZ E 2 H MPACT  [employer] [facility] [id number] Left 1 Implanted   IMPL HIP ACETABULAR SHELL 50 2HL - [id number] Hip IMPL HIP ACETABULAR SHELL 50 2HL  [employer] [facility] [id number] Left 1 Implanted   STEM FEM SZ 9 LAT + MASTERLOC - [id number]  STEM FEM SZ 9 LAT + MASTERLOC  [employer] [facility] [id number] Left 1 Implanted   HEAD FEM L [id number] MECTACER BIOLOX DELT - [id number]  HEAD FEM L [id number] MECTACER BIOLOX DELT  [employer] [facility] [id number] Left 1 Implanted         Drains: * No LDAs found *    Findings: See operative report     Electronically signed by Mónica Gale DO on 6/13/2022 at 9:27 AM

## 2022-06-14 VITALS
OXYGEN SATURATION: 98 % | RESPIRATION RATE: 14 BRPM | HEIGHT: 69 IN | BODY MASS INDEX: 24.88 KG/M2 | WEIGHT: 168 LBS | TEMPERATURE: 98.8 F | DIASTOLIC BLOOD PRESSURE: 60 MMHG | HEART RATE: 87 BPM | SYSTOLIC BLOOD PRESSURE: 124 MMHG

## 2022-06-14 LAB
ABSOLUTE EOS #: 0.07 K/UL (ref 0–0.44)
ABSOLUTE IMMATURE GRANULOCYTE: 0.05 K/UL (ref 0–0.3)
ABSOLUTE LYMPH #: 1.12 K/UL (ref 1.1–3.7)
ABSOLUTE MONO #: 0.36 K/UL (ref 0.1–1.2)
BASOPHILS # BLD: 0 % (ref 0–2)
BASOPHILS ABSOLUTE: 0.03 K/UL (ref 0–0.2)
EOSINOPHILS RELATIVE PERCENT: 1 % (ref 1–4)
HCT VFR BLD CALC: 28.3 % (ref 40.7–50.3)
HEMOGLOBIN: 9.2 G/DL (ref 13–17)
IMMATURE GRANULOCYTES: 1 %
LYMPHOCYTES # BLD: 12 % (ref 24–43)
MCH RBC QN AUTO: 32.1 PG (ref 25.2–33.5)
MCHC RBC AUTO-ENTMCNC: 32.5 G/DL (ref 28.4–34.8)
MCV RBC AUTO: 98.6 FL (ref 82.6–102.9)
MONOCYTES # BLD: 4 % (ref 3–12)
NRBC AUTOMATED: 0 PER 100 WBC
PDW BLD-RTO: 11.8 % (ref 11.8–14.4)
PLATELET # BLD: 119 K/UL (ref 138–453)
PMV BLD AUTO: 10 FL (ref 8.1–13.5)
RBC # BLD: 2.87 M/UL (ref 4.21–5.77)
SEG NEUTROPHILS: 83 % (ref 36–65)
SEGMENTED NEUTROPHILS ABSOLUTE COUNT: 7.76 K/UL (ref 1.5–8.1)
WBC # BLD: 9.4 K/UL (ref 3.5–11.3)

## 2022-06-14 PROCEDURE — 97116 GAIT TRAINING THERAPY: CPT

## 2022-06-14 PROCEDURE — 97110 THERAPEUTIC EXERCISES: CPT

## 2022-06-14 PROCEDURE — 85025 COMPLETE CBC W/AUTO DIFF WBC: CPT

## 2022-06-14 PROCEDURE — 6360000002 HC RX W HCPCS: Performed by: STUDENT IN AN ORGANIZED HEALTH CARE EDUCATION/TRAINING PROGRAM

## 2022-06-14 PROCEDURE — 97530 THERAPEUTIC ACTIVITIES: CPT

## 2022-06-14 PROCEDURE — 6370000000 HC RX 637 (ALT 250 FOR IP): Performed by: STUDENT IN AN ORGANIZED HEALTH CARE EDUCATION/TRAINING PROGRAM

## 2022-06-14 PROCEDURE — 2580000003 HC RX 258: Performed by: STUDENT IN AN ORGANIZED HEALTH CARE EDUCATION/TRAINING PROGRAM

## 2022-06-14 PROCEDURE — 97535 SELF CARE MNGMENT TRAINING: CPT

## 2022-06-14 PROCEDURE — 36415 COLL VENOUS BLD VENIPUNCTURE: CPT

## 2022-06-14 RX ADMIN — ONDANSETRON 4 MG: 2 INJECTION INTRAMUSCULAR; INTRAVENOUS at 09:24

## 2022-06-14 RX ADMIN — ACETAMINOPHEN 650 MG: 325 TABLET ORAL at 00:42

## 2022-06-14 RX ADMIN — ASPIRIN 81 MG: 81 TABLET, COATED ORAL at 09:14

## 2022-06-14 RX ADMIN — SODIUM CHLORIDE: 9 INJECTION, SOLUTION INTRAVENOUS at 05:33

## 2022-06-14 RX ADMIN — GABAPENTIN 300 MG: 300 CAPSULE ORAL at 09:14

## 2022-06-14 RX ADMIN — OXYCODONE 10 MG: 5 TABLET ORAL at 13:33

## 2022-06-14 RX ADMIN — MELOXICAM 7.5 MG: 7.5 TABLET ORAL at 09:14

## 2022-06-14 RX ADMIN — OXYCODONE 10 MG: 5 TABLET ORAL at 09:14

## 2022-06-14 RX ADMIN — OXYCODONE 10 MG: 5 TABLET ORAL at 00:42

## 2022-06-14 RX ADMIN — OXYCODONE 10 MG: 5 TABLET ORAL at 05:14

## 2022-06-14 RX ADMIN — ACETAMINOPHEN 650 MG: 325 TABLET ORAL at 05:13

## 2022-06-14 RX ADMIN — ACETAMINOPHEN 650 MG: 325 TABLET ORAL at 13:33

## 2022-06-14 ASSESSMENT — PAIN DESCRIPTION - DESCRIPTORS
DESCRIPTORS: ACHING;DULL
DESCRIPTORS: ACHING;DULL
DESCRIPTORS: ACHING

## 2022-06-14 ASSESSMENT — PAIN DESCRIPTION - PAIN TYPE
TYPE: SURGICAL PAIN
TYPE: SURGICAL PAIN

## 2022-06-14 ASSESSMENT — PAIN SCALES - GENERAL
PAINLEVEL_OUTOF10: 7

## 2022-06-14 ASSESSMENT — PAIN DESCRIPTION - FREQUENCY
FREQUENCY: CONTINUOUS
FREQUENCY: CONTINUOUS

## 2022-06-14 ASSESSMENT — PAIN DESCRIPTION - LOCATION
LOCATION: HIP

## 2022-06-14 ASSESSMENT — PAIN - FUNCTIONAL ASSESSMENT
PAIN_FUNCTIONAL_ASSESSMENT: PREVENTS OR INTERFERES SOME ACTIVE ACTIVITIES AND ADLS
PAIN_FUNCTIONAL_ASSESSMENT: PREVENTS OR INTERFERES SOME ACTIVE ACTIVITIES AND ADLS

## 2022-06-14 ASSESSMENT — PAIN DESCRIPTION - ORIENTATION
ORIENTATION: LEFT

## 2022-06-14 ASSESSMENT — PAIN DESCRIPTION - ONSET
ONSET: ON-GOING
ONSET: ON-GOING

## 2022-06-14 NOTE — PROGRESS NOTES
Orthopedic Progress Note    Patient:  Danny Moon  YOB: 1970     46 y.o. male    Subjective:  Patient seen and examined  No complaints or concerns  No issue overnight  Pain controlled  Denies fever, HA, CP, SOB  Was unable to mobilize with PT yesterday     Vitals reviewed, afebrile    Objective:   Vitals:    06/14/22 0746   BP: 118/63   Pulse: 85   Resp: 16   Temp: 98.6 °F (37 °C)   SpO2: 97%     Gen: NAD, cooperative     Cardiovascular: regular rate, no dependent edema    Respiratory: No acute respiratory distress    LLE: Optifoam c/d/i. Able to straight leg raise. Compartments soft. 2+ DP pulse. TA/EHL/FHL/GS motor intact. Deep and Superficial Peroneal/Saphenous/Sural SILT.     Recent Labs     06/14/22  0537   WBC 9.4   HGB 9.2*   HCT 28.3*   *      Meds: ASA   See rec for complete list    Impression 46 y.o. male being seen for the following    - Left total hip arthroplasty, POD1    Plan    - WBAT to LLE  - Encourage IS and mobilization with PT  - DVT ppx: ASA 81 mg BID  - Plan for DC today     Electronically signed by Amanda Mcgarry DO 8:49 AM 6/14/2022

## 2022-06-14 NOTE — PROGRESS NOTES
CLINICAL PHARMACY NOTE: MEDS TO BEDS    Total # of Prescriptions Filled: 2   The following medications were delivered to the patient:  · Oxycodone-APAP 5-325 mg tabs  · Aspirin 81 mg EC tabs    Additional Documentation:    Delivered to pt's room 6/14/22. $1.52 copay, paid cash.

## 2022-06-14 NOTE — PLAN OF CARE
Problem: Discharge Planning  Goal: Discharge to home or other facility with appropriate resources  6/14/2022 1424 by Alex Mobley RN  Outcome: Completed  6/14/2022 0943 by Alex Mobley RN  Outcome: Progressing  Flowsheets (Taken 6/14/2022 0914)  Discharge to home or other facility with appropriate resources: Refer to discharge planning if patient needs post-hospital services based on physician order or complex needs related to functional status, cognitive ability or social support system  6/14/2022 0502 by Ivonne Malik RN  Outcome: Progressing  Flowsheets (Taken 6/13/2022 1837 by Alex Mobley RN)  Discharge to home or other facility with appropriate resources: Refer to discharge planning if patient needs post-hospital services based on physician order or complex needs related to functional status, cognitive ability or social support system     Problem: Pain  Goal: Verbalizes/displays adequate comfort level or baseline comfort level  6/14/2022 1424 by Alex Mobley RN  Outcome: Completed  6/14/2022 0943 by Alex Mobley RN  Outcome: Progressing  6/14/2022 0502 by Ivonne Malik RN  Outcome: Progressing     Problem: ABCDS Injury Assessment  Goal: Absence of physical injury  6/14/2022 1424 by Alex Mobley RN  Outcome: Completed  6/14/2022 0943 by Alex Mobley RN  Outcome: Progressing  6/14/2022 0502 by Ivonne Malik RN  Outcome: Progressing     Problem: Neurosensory - Adult  Goal: Achieves stable or improved neurological status  Outcome: Completed  Goal: Achieves maximal functionality and self care  Outcome: Completed     Problem: Respiratory - Adult  Goal: Achieves optimal ventilation and oxygenation  Outcome: Completed     Problem: Cardiovascular - Adult  Goal: Maintains optimal cardiac output and hemodynamic stability  Outcome: Completed     Problem: Skin/Tissue Integrity - Adult  Goal: Incisions, wounds, or drain sites healing without S/S of infection  Outcome: Completed     Problem: Musculoskeletal - Adult  Goal: Return mobility to safest level of function  Outcome: Completed     Problem: Gastrointestinal - Adult  Goal: Maintains adequate nutritional intake  Outcome: Completed     Problem: Genitourinary - Adult  Goal: Absence of urinary retention  Outcome: Completed     Problem: Infection - Adult  Goal: Absence of fever/infection during anticipated neutropenic period  Outcome: Completed     Problem: Metabolic/Fluid and Electrolytes - Adult  Goal: Electrolytes maintained within normal limits  Outcome: Completed     Problem: Hematologic - Adult  Goal: Maintains hematologic stability  Outcome: Completed

## 2022-06-14 NOTE — PROGRESS NOTES
Physical Therapy  Facility/Department: [facility] MED SURG  Daily Treatment Note  NAME: Inés Brennan  : 1970  MRN: [id number]    Date of Service: 2022    Discharge Recommendations:  Patient would benefit from continued therapy after discharge   Patient Diagnosis(es): The primary encounter diagnosis was S/P total left hip arthroplasty. A diagnosis of Status post total hip replacement, left was also pertinent to this visit. Assessment   Assessment: Pt doing well with mobility, no episodes of L knee buckling during ambulation. Overall pt requires some assistance with mobility primarily for safety. AM-PAC score of 18 for current  level of function. Activity Tolerance: Patient tolerated treatment well   Plan    Plan  Plan: 2 times a day 7 days a week  Specific Instructions for Next Treatment: Assess quad control, BP, and progress ambulation as able; stair negotiation  Current Treatment Recommendations: Strengthening;ROM;Balance training;Functional mobility training;Transfer training;Neuromuscular re-education;Stair training;Gait training; Endurance training;Pain management;Home exercise program;Safety education & training;Patient/Caregiver education & training;Equipment evaluation, education, & procurement     Restrictions  Restrictions/Precautions  Restrictions/Precautions: General Precautions,Fall Risk,Weight Bearing  Required Braces or Orthoses?: No  Lower Extremity Weight Bearing Restrictions  Left Lower Extremity Weight Bearing: Weight Bearing As Tolerated  Position Activity Restriction  Other position/activity restrictions: L PATSY anterior approach - NO SLR, RUE IV, Up w/ assist     Subjective    Subjective  Subjective: Pt states he is feeling better today, agreeable to PT session  Orientation  Overall Orientation Status: Within Normal Limits  Cognition  Overall Cognitive Status: WNL     Objective   Bed Mobility Training  Bed Mobility Training: Yes  Overall Level of Assistance: Contact-guard assistance;Minimum assistance  Interventions: Safety awareness training; Tactile cues; Verbal cues  Rolling: Stand-by assistance  Supine to Sit: Contact-guard assistance;Minimum assistance  Sit to Supine: Contact-guard assistance;Minimum assistance  Scooting: Stand-by assistance  Balance  Sitting: Intact  Standing: Intact  Transfer Training  Transfer Training: Yes  Overall Level of Assistance: Contact-guard assistance  Interventions: Safety awareness training;Verbal cues  Sit to Stand: Contact-guard assistance  Stand to Sit: Contact-guard assistance  Bed to Chair: Contact-guard assistance  Gait Training  Gait Training: Yes  Right Side Weight Bearing: Full  Left Side Weight Bearing: Full (WBAT)  Gait 1  Overall Level of Assistance: Contact-guard assistance  Interventions: Safety awareness training;Verbal cues  Speed/Hannah: Slow  Step Length: Right shortened;Left shortened  Gait Abnormalities: Step to gait  Distance (ft): 20 Feet  Assistive Device: Walker, rolling;Gait belt  Gait 2  Overall Level of Assistance: Contact-guard assistance  Interventions: Safety awareness training;Verbal cues  Speed/Hannah: Slow  Step Length: Right shortened;Left shortened  Gait Abnormalities: Step to gait then progressed to step through; no buckling  Distance (ft): 70 Feet  Assistive Device: Walker, rolling;Gait belt  Gait 3  Gait Abnormalities: Step to gait then progressed to step through; no buckling  Distance (ft): 50 Feet  Assistive Device: Walker, rolling;Gait belt  Stair training  Rail Use: Both  Stairs - Level of Assistance: Contact-guard assistance  Number of Stairs Trained: 5  Comments: Pt demonstrated good ability navigating stairs and good understanding of pattern.       PT Exercises  Exercise Treatment: yes  A/AROM Exercises: heel slides w/assist x  Quad set x (good quad contraction)  Hip abduction w/assist x  Glut set x  Circulation/Endurance Exercises: ankle pumps  Pt was provided w/ written instruction on all exercises and handouts were given on anterior hip precautions and stair training. Pt reports and demonstrates good understanding. Goals  Short Term Goals  Time Frame for Short term goals: 6 visits  Short term goal 1: Pt to demonstrate bed mobility Sharmila using sheet as leg  for LLE to maintain PATSY precautions  Short term goal 2: Pt to perform STS transfers w/ RW Sharmila  Short term goal 3: Pt to ambulate at least 50ft w/ RW Sharmila  Short term goal 4: Pt to ascend/descend 2 stairs w/o handrails CGA  Short term goal 5: Pt to be indep w/ PATSY HEP  Patient Goals   Patient goals :  To go home    Education  Patient Education  Education Given To: Patient  Education Provided: Role of Therapy;Plan of Care;Home Exercise Program;Transfer Training  Education Method: Demonstration;Verbal;Printed Information/Hand-outs  Barriers to Learning: None  Education Outcome: Demonstrated understanding;Verbalized understanding    Therapy Time   Individual Concurrent Group Co-treatment   Time In 1005         Time Out 1059         Minutes [address] [place], [place]

## 2022-06-14 NOTE — DISCHARGE INSTR - COC
Continuity of Care Form    Patient Name: Brian Sifuentes   :  1970  MRN:  [id number]    Admit date:  2022  Discharge date:  ***    Code Status Order: Prior   Advance Directives:   [facility] Documentation       Date/Time Healthcare Directive Type of Healthcare Directive Copy in [address] [facility] [address] Agent's Name Healthcare Agent's Phone Number    22 Yes, patient has an advance directive for healthcare treatment -- -- -- -- --            Admitting Physician:  Cynthia Grover DO  PCP: ESTELA Marks CNP    Discharging Nurse: [facility] Unit/Room#:   Discharging Unit Phone Number: ***    Emergency Contact:   Extended Emergency Contact Information  Primary Emergency Contact: Martha Wooten  Home Phone: [phone]  Relation: Brother/Sister    Past Surgical History:  Past Surgical History:   Procedure Laterality Date    TOTAL HIP ARTHROPLASTY Left 2022    LEFT HIP TOTAL ARTHROPLASTY ANTERIOR APPROACH -Vanessa Hunter performed by Cynthia Grover DO at [address] [place] [address]       Immunization History:   Immunization History   Administered Date(s) Administered    COVID-19, Pfizer Purple top, DILUTE for use, 12+ yrs, 30mcg/0.3mL dose 2021, 2021, 2021    Influenza, MDCK Quadv, IM, PF (Flucelvax 2 yrs and older) 2022    Tdap (Boostrix, Adacel) 2021       Active Problems:  Patient Active Problem List   Diagnosis Code    S/P total left hip arthroplasty Z96.642    Status post total hip replacement, left Z96.642       Isolation/Infection:   Isolation            No Isolation          Patient Infection Status       None to display            Nurse Assessment:  Last Vital Signs: /63   Pulse 85   Temp 98.6 °F (37 °C) (Oral)   Resp 16   Ht 5' 9\" (1.753 m)   Wt 168 lb (76.2 kg)   SpO2 97%   BMI 24.81 kg/m²     Last documented pain score (0-10 scale): Pain Level: 7  Last Weight:   Wt Readings from Last 1 Encounters:   22 168 lb (76.2 kg)     Mental Status:  {IP PT MENTAL STATUS:12317}    IV Access:  { DELTA IV ACCESS:987499976}    Nursing Mobility/ADLs:  Walking   {CHP DME CPVI:328538690}  Transfer  {CHP DME YWVK:275430032}  Bathing  {CHP DME UYTF:807332708}  Dressing  {CHP DME TCZ}  Toileting  {CHP DME NDQA:941898748}  Feeding  {CHP DME LOOU:840098182}  Med Admin  {CHP DME VNQY:895656716}  Med Delivery   { DELTA MED Delivery:971897575}    Wound Care Documentation and Therapy:  Incision 22 Hip Anterior; Left (Active)   Dressing Status Clean;Dry; Intact 22   Dressing/Treatment Other (comment) 22   Closure Other (Comment) 22   Drainage Amount None 22   Odor None 22   Number of days: 1        Elimination:  Continence: Bowel: {YES / OT:07399}  Bladder: {YES / PP:76610}  Urinary Catheter: {Urinary Catheter:465235073}   Colostomy/Ileostomy/Ileal Conduit: {YES / RP:54571}       Date of Last BM: ***    Intake/Output Summary (Last 24 hours) at 2022 1137  Last data filed at 2022 0943  Gross per 24 hour   Intake 2549.86 ml   Output 1200 ml   Net 1349.86 ml     I/O last 3 completed shifts:   In: 4433.9 [P.O.:50; I.V.:3806.5; IV Piggyback:577.4]  Out: 1150 [Urine:850; Blood:300]    Safety Concerns:     [employer] Safety Concerns:731455458}    Impairments/Disabilities:      [employer] Impairments/Disabilities:002153589}    Nutrition Therapy:  Current Nutrition Therapy:   [employer] Diet List:751068197}    Routes of Feeding: {CHP DME Other Feedings:203920851}  Liquids: {Slp liquid thickness:10152}  Daily Fluid Restriction: {CHP DME Yes amt example:326779593}  Last Modified Barium Swallow with Video (Video Swallowing Test): {Done Not Done [facility]:607700064}    Treatments at the Time of Hospital Discharge:   Respiratory Treatments: ***  Oxygen Therapy:  {Therapy; copd oxygen:26148}  Ventilator:    {MH CC Vent NYRI:256257795}    Rehab Therapies: {THERAPEUTIC INTERVENTION:9190489092}  Weight Bearing Status/Restrictions: 50Mariam Ordoñez CC Weight Bearin}  Other Medical Equipment (for information only, NOT a DME order):  {EQUIPMENT:306040937}  Other Treatments: ***    Patient's personal belongings (please select all that are sent with patient):  {CHP DME Belongings:513491562}    RN SIGNATURE:  {Esignature:942847762}    CASE MANAGEMENT/SOCIAL WORK SECTION    Inpatient Status Date: ***    Readmission Risk Assessment Score:  Readmission Risk              Risk of Unplanned Readmission:  0           Discharging to Facility/ Agency   Name:   Address:  Phone:  Fax:    Dialysis Facility (if applicable)   Name:  Address:  Dialysis Schedule:  Phone:  Fax:    / signature: {Esignature:621695161}    PHYSICIAN SECTION    Prognosis: {Prognosis:0859620813}    Condition at Discharge: Dayne Ordoñez Patient Condition:744205333}    Rehab Potential (if transferring to Rehab): {Prognosis:0471439458}    Recommended Labs or Other Treatments After Discharge: ***    Physician Certification: I certify the above information and transfer of Bjorn Mcneill  is necessary for the continuing treatment of the diagnosis listed and that he requires {Admit to Appropriate Level of Care:16730} for {GREATER/LESS:563880408} 30 days.      Update Admission H&P: {CHP DME Changes in WWSFW:957049631}    PHYSICIAN SIGNATURE:  {Esignature:350749301}

## 2022-06-14 NOTE — FLOWSHEET NOTE
[facility] NOTE    Room # 2005/2005-01   Name: Maris Padilla            Jain: Non-Quaker     Reason for visit: Routine    I visited the patient. Admit Date & Time: 6/13/2022  6:04 AM    Assessment:  Maris Padilla is a 46 y.o. male in the hospital for hip surgery. Upon entering the room patient was sitting on the side of the bed. Patient expressed that the surgery went well, he is feeling good, and was getting ready to be discharged. Intervention:  I introduced myself and my title as  I told patient I was happy that surgery went well, and that I hoped his recovery would continue to be well. Outcome:  Patient was receptive and expressed gratitude for the visit. Plan:  Chaplains will remain available to offer spiritual and emotional support as needed.     Electronically signed by Radha Sumner on 6/14/2022 at 10:51 AM.  [address] [facility]

## 2022-06-14 NOTE — PLAN OF CARE
Problem: Discharge Planning  Goal: Discharge to home or other facility with appropriate resources  6/14/2022 0943 by Rosario Echavarria RN  Outcome: Progressing  Flowsheets (Taken 6/14/2022 0914)  Discharge to home or other facility with appropriate resources: Refer to discharge planning if patient needs post-hospital services based on physician order or complex needs related to functional status, cognitive ability or social support system  6/14/2022 0502 by Paramjit Rodriguez RN  Outcome: Progressing  Flowsheets (Taken 6/13/2022 1837 by Rosario Echavarria RN)  Discharge to home or other facility with appropriate resources: Refer to discharge planning if patient needs post-hospital services based on physician order or complex needs related to functional status, cognitive ability or social support system     Problem: Pain  Goal: Verbalizes/displays adequate comfort level or baseline comfort level  6/14/2022 0943 by Rosario Echavarria RN  Outcome: Progressing  6/14/2022 0502 by Paramjit Rodriguez RN  Outcome: Progressing     Problem: ABCDS Injury Assessment  Goal: Absence of physical injury  6/14/2022 0943 by Rosario Echavarria RN  Outcome: Progressing  6/14/2022 0502 by Paramjit Rodriguez RN  Outcome: Progressing

## 2022-06-14 NOTE — PROGRESS NOTES
Occupational Therapy  Facility/Department: [facility]  Rehabilitation Occupational Therapy Daily Treatment Note    Date: 22  Patient Name: Jaspreet Marquez       Room:   MRN: [id number]  Account: [de-identified]   : 1970  (46 y.o.) Gender: male          Past Medical History:  has a past medical history of Hypertension and Tobacco abuse. Past Surgical History:   has a past surgical history that includes Total hip arthroplasty (Left, 2022). Restrictions  Restrictions/Precautions: General Precautions; Fall Risk;Weight Bearing  Other position/activity restrictions: L PATSY anterior approach - NO SLR, RUE IV, Up w/ assist  Left Lower Extremity Weight Bearing: Weight Bearing As Tolerated  Required Braces or Orthoses?: No    Subjective  Subjective: \"I'll learn by doing it. \"  Restrictions/Precautions: General Precautions; Fall Risk;Weight Bearing      Objective     Cognition  Overall Cognitive Status: WFL  Orientation  Overall Orientation Status: Within Functional Limits         ADL  Grooming/Oral Hygiene  Assistance Level: Set-up; Stand by assist  Skilled Clinical Factors: brushing teeth standing at sink  Upper Extremity Bathing  Assistance Level: Set-up; Supervision  Skilled Clinical Factors: seated  Lower Extremity Bathing  Assistance Level: Set-up; Supervision; Increased time to complete  Upper Extremity Dressing  Assistance Level: Set-up; Modified independent  Lower Extremity Dressing  Assistance Level: Set-up; Verbal cues; Supervision  Skilled Clinical Factors: Verbal cues for threading affected extremity first  Putting On/Taking Off Footwear  Assistance Level: Set-up; Maximum assistance;Supervision  Skilled Clinical Factors: SUP for don/doffing socks, Max A to don compression socks  Tub/Shower Transfers  Type: Shower  Transfer From: [place]  Transfer To: Tub transfer bench  Additional Factors: Cues for hand placement;Verbal cues; Set-up  Assistance Level: Set-up; Stand by assist  Skilled Clinical Factors: Verbal cues for sequencing/technique to improve safety          Functional Mobility  Device: Rolling walker  Activity: To/From bathroom  Assistance Level: Stand by assist  Skilled Clinical Factors: Patient completed functional mobility to/from door and to/from bathroom with good sequencing and safety technique. Patient demonstrated good safety awareness and pacing  Bed Mobility  Overall Assistance Level: Stand By Assist  Additional Factors: Increased time to complete;Verbal cues  Bridging  Skilled Clinical Factors: VC for using a sheet to prevent SLR  Transfers  Surface: To chair with arms  Additional Factors: Verbal cues; Set-up  Device: Walker  Sit to Stand  Assistance Level: Stand by assist  Stand to Sit  Assistance Level: Stand by assist         Assessment  Assessment  Activity Tolerance: Patient tolerated treatment well  Discharge Recommendations: Patient would benefit from continued therapy after discharge  Safety Devices  Safety Devices in place: Yes  Type of devices: All fall risk precautions in place; Left in chair;Nurse notified;Call light within reach    Patient Education  Education  Education Given To: Patient  Education Provided: Role of Therapy; Fall Prevention Strategies; Plan of Care;Transfer Training;Energy Conservation;Precautions; Safety;ADL Function;DME/Home Modifications; Equipment  Education Method: Verbal;Printed Information/Hand-outs  Barriers to Learning: None  Education Outcome: Verbalized understanding;Demonstrated understanding   Access Code: [email or web address]  URL: [email or web address]  Date: 06/14/2022  Prepared by: Aleksandr Saravia    Patient Education  Anterior Hip Precautions  Anterior Hip Precautions Handout  Understanding Energy Conservation  Bathing & Showering Tips  Adaptive Equipment for Bathing & Showering  Dressing Tips  Adaptive Equipment for Dressing    Plan  Plan  Times per Week: 5-6x per week, 1-2x per day  Times per Day: Daily  Current Treatment Recommendations: Strengthening;Balance training;Functional mobility training; Endurance training;Neuromuscular re-education;Patient/Caregiver education & training;Self-Care / ADL;Equipment evaluation, education, & procurement; Safety education & training;Positioning;Return to work related activity    Goals  Patient Goals   Patient goals : to go home  Short Term Goals  Time Frame for Short term goals: by discharge, pt to demo  Short Term Goal 1: I/MI for bed mob tasks without bed rails  Short Term Goal 2: I/MI for total body ADLs with AE as needed and Good safety  Short Term Goal 3: I/MI for ADL transfers and functional mob with AD and Good safety  Short Term Goal 4: pt to be IND with EC/WS, fall prevention tech, pressure relief education, precautions, L PATSY education, as well as DME/AE recommendations with use of handouts as needed    AM-PAC Score        AM-PAC Inpatient Daily Activity Raw Score: 20 (06/14/22 1043)  AM-PAC Inpatient ADL T-Scale Score : 42.03 (06/14/22 1043)  ADL Inpatient CMS 0-100% Score: 38.32 (06/14/22 1043)  ADL Inpatient CMS G-Code Modifier : CJ (06/14/22 1043)      Therapy Time   Individual Concurrent Group Co-treatment   Time In 0848         Time Out 0950         Minutes 62           Upon writer exit, call light within reach, pt retired to chair. All lines intact and patient positioned comfortably. All patient needs addressed prior to ending therapy session. Chart reviewed prior to treatment and patient is agreeable for therapy. RN reports patient is medically stable for therapy treatment this date.       GINA Triplett/AUDIE

## 2022-06-16 ENCOUNTER — HOSPITAL ENCOUNTER (OUTPATIENT)
Dept: PHYSICAL THERAPY | Facility: CLINIC | Age: 52
Setting detail: THERAPIES SERIES
Discharge: HOME | End: 2022-06-16
Payer: COMMERCIAL

## 2022-06-16 PROCEDURE — 97016 VASOPNEUMATIC DEVICE THERAPY: CPT

## 2022-06-16 PROCEDURE — 97161 PT EVAL LOW COMPLEX 20 MIN: CPT

## 2022-06-16 PROCEDURE — 97110 THERAPEUTIC EXERCISES: CPT

## 2022-06-16 NOTE — CONSULTS
[] [facility]) - [facility] &  Therapy  [address]  [phone]  F: [phone] [] [address]  [address] [facility]   [address]  P: [phone]  F: [phone] [] [address] &  Therapy  [address] [facility]  P: [phone]  F: [phone] [] [address]  [facility]   [address]   [place]: [phone]  F: [phone]  [x] [address]: [phone]  F: [phone]      Physical Therapy Lower Extremity Evaluation    Date:  2022  Patient: Kendal Fitch  :  1970  MRN: [id number]  Physician: Dr. Salguero Fernando: Oswaldo Martin (48 visits)  Medical Diagnosis: S/P L hip PATSY  Rehab Codes: M16.12  Onset date: 20  Next 's appt.: TBA    Subjective:   CC:Pt arrives with RW, had surgery 3 days ago. Has been completing home exercises that were given at the hospital. States that noticed an increase in pain yesterday, has been keeping up with medicine. PMHx: [] Unremarkable [] Diabetes [] HTN  [] Pacemaker   [] MI/Heart Problems [] Cancer [] Arthritis [] Other:              [x] Refer to full medical chart  In EPIC         Tests: [] X-Ray: [] MRI:  [] Other:    Medications: [x] Refer to full medical record [] None [] Other:  Allergies:      [x] Refer to full medical record [] None [] Other:        Marital Status    Home type One story   Stairs from outside Two steps            Hand rail    Stairs inside            Hand rail    Employment    Job status Off d/t condition, RTW- Aug 1st   Work Activities/duties  On feet majority of the day    Recreational Activities Bike rides, going for walks       Pain present?  Yes   Location L hip    Pain Rating currently 2/10 at rest, 8/10 during ambulation   Pain at worse 8/10   Pain at best 2/10   Description of pain Ache    Altered Sensation Denies   What makes it worse    What makes it better    Symptom progression    Sleep Difficulty to get comfortable                Objective:    ROM  ° A/P STRENGTH    Left Right Left Right   Hip Flex   3-/5 5/5   Ext       ER       IR       ABD       ADD       Knee Flex   4/5 5/5   Ext   4-/5 5/5       OBSERVATION No Deficit Deficit Not Tested Comments   Posture       Forward Head [] [] []    Rounded Shoulders [] [] []    Observation           Palpation [] [x] [] TTP L hip    Sensation [] [] []    Edema [] [] []    Neurological [] [] []    Patellar Mobility [] [] []    Patellar Orientation [] [] []    Gait [] [x] [] Analysis:Ambulates with RW with step through gait pattern, decreased step length on RLE       FUNCTION Normal Difficult Unable   Sitting [x] [] []   Standing [] [x] []   Ambulation [] [x] []   Groom/Dress [] [x] []   Lift/Carry [] [x] []   Stairs [] [x] []   Bending [] [x] []   Squat [] [] [x]   Kneel [] [] [x]                                Functional Test: LEFS Score: 89% functionally impaired     Comments:    Assessment:   Patient presents with signs and symptoms consistent with s/p L hip PATSY, including LLE weakness in hip flex, knee ext and flex. Pt also presents with gait deficits d/t requiring the use of a RW d/t pain and instability. Patient would benefit from skilled physical therapy services in order to: Increase LLE strength, promote an increase in ambulation progressing from RW to straight cane to no device as tolerated in order for patient to return to work. Goals:        STG: (to be met in 10 treatments)  1. ? Pain: Decrease pain levels to 4/10 at worst  2. ? ROM: Increase flexibility and AROM limitations throughout to equal bilat to reduce difficulty with ADLs, full L hip painfree ROM  3. ? Strength:  Increase L hip flex strength to 4/5  4. ? Function: Pt to ambulate with straight cane without any gait deficits  5. Independent with Home Exercise Programs      LTG: (to be met in 20 treatments)  1. Improve score on assessment tool from 89% impaired to 40% impaired, demonstrating an improvement in ADLs  2. Reduce pain levels to 0/10  3. Pt to have increased L hip flex strength to 5/5  4. Pt to ambulate without any device and without any gait deficits                    Patient goals: To return to work    Rehab Potential:  [x] Good  [] [address]  [] Poor   Suggested Professional Referral:  [x] No  [] Yes:  Barriers to Goal Achievement[de-identified]  [x] No  [] Yes:  Domestic Concerns:  [x] No  [] Yes:    Pt. Education:  [x] Plans/Goals, Risks/Benefits discussed  [x] Home exercise program    Method of Education: [x] Verbal  [x] Demo  [x] Written  Comprehension of Education:  [x] Verbalizes understanding. [x] Demonstrates understanding. [x] Needs Review. [] Demonstrates/verbalizes understanding of HEP/Ed previously given. Access Code: [email or web address]  URL: [email or web address]/  Date: 74/85/8391  Prepared by: Curtis Velasco    Exercises  Supine Heel Slide - 1 x daily - 7 x weekly - 2 sets - 10 reps  Supine Knee Extension Strengthening - 1 x daily - 7 x weekly - 2 sets - 10 reps  Supine Quad Set on Towel Roll - 1 x daily - 7 x weekly - 2 sets - 10 reps  Heel rises with counter support - 1 x daily - 7 x weekly - 2 sets - 10 reps  Standing March with Counter Support - 1 x daily - 7 x weekly - 2 sets - 10 reps  Standing Knee Flexion - 1 x daily - 7 x weekly - 2 sets - 10 reps      Treatment Plan:  [x] Therapeutic Exercise (94885 )             [] Aquatic Therapy (20030)  [x] Manual Therapy (98920)              [] Electrical Stimulation- Unattended (45584)  [] Integrative Dry Needling                                      [] Electrical Stimulation- Attended (91613)  [x] Instruction in HEP                             [] Lumbar/Cervical Traction (45595)  [] Neuromuscular Re-education (75088)            [x] Cold/hotpack    [x] Vasocompression (26056)                                   [] Ultrasound (91988)                      [x] Gait Training (52663)                                                          [] Therapeutic Activity (80012)               [] Iontophoresis (15696): 4 mg/mL Dexamethasone Sodium Phosphate 40-80 mAmin            []  Medication allergies reviewed for use of    Dexamethasone Sodium Phosphate 4mg/ml     with iontophoresis treatments. Pt is not allergic.     Frequency:  3 x/week for 20 visits (3x/wk for 2-3 weeks, then decrease to 2x/wk)    Todays Treatment:  Precautions: Anterior approach  Exercises:  Exercise  L PATSY Reps/ Time Weight/ Level Comments   NUSTEP 5' L1          SAQ 2x10     Quad set 10x5\" hold     Heel slide 10x  Attempt two sets next   Supine hip abd with slide board 10x  \"  \"         Standing   All in // bars   Darol Ethan 10x  Cues to decrease hip flex into significant pain   Heel raises 10x     HS curls 10x                             Other:    Vasocompression: 15' moderate compression 34deg supine with bolster    Specific Instructions for next treatment: Initiate with Nustep, progress to TG squats with less than 90deg hip flex    Evaluation Complexity:  History (Personal factors, comorbidities) [x] 0 [] 1-2 [] 3+   Exam (limitations, restrictions) [x] 1-2 [] 3 [] 4+   Clinical presentation (progression) [x] Stable [] Evolving  [] Unstable   Decision Making [x] Low [] Moderate [] High    [x] Low Complexity [] Moderate Complexity [] High Complexity       Treatment Charges: Mins Units   [x] Evaluation       [x]  Low       []  Moderate       []  High 15 1   []  Modalities     [x]  Ther Exercise 15 1   []  Manual Therapy     []  Ther Activities     []  Aquatics     [x]  Vasocompression 15 1   []  Other       TOTAL TREATMENT TIME: 45 minutes    Time in: 1105     Time Out: 1150    Electronically signed by: Modesta Kang PT        Physician Signature:________________________________Date:__________________  By signing above or cosigning this note, I have reviewed this plan of care and certify a need for medically necessary rehabilitation services.      *PLEASE SIGN ABOVE AND FAX BACK ALL PAGES*

## 2022-06-17 ENCOUNTER — HOSPITAL ENCOUNTER (OUTPATIENT)
Dept: PHYSICAL THERAPY | Facility: CLINIC | Age: 52
Setting detail: THERAPIES SERIES
Discharge: HOME | End: 2022-06-17
Payer: COMMERCIAL

## 2022-06-17 PROCEDURE — 97110 THERAPEUTIC EXERCISES: CPT

## 2022-06-17 PROCEDURE — 97016 VASOPNEUMATIC DEVICE THERAPY: CPT

## 2022-06-20 ENCOUNTER — HOSPITAL ENCOUNTER (OUTPATIENT)
Dept: PHYSICAL THERAPY | Facility: CLINIC | Age: 52
Setting detail: THERAPIES SERIES
Discharge: HOME | End: 2022-06-20
Payer: COMMERCIAL

## 2022-06-20 PROCEDURE — 97110 THERAPEUTIC EXERCISES: CPT

## 2022-06-20 PROCEDURE — 97016 VASOPNEUMATIC DEVICE THERAPY: CPT

## 2022-06-20 NOTE — FLOWSHEET NOTE
[] [facility]) [facility] &  Therapy  [address]  [phone]  F: [phone] [] [address]  [address]   [address]  P: [phone]  F: [phone] [] [address]  [address]  P: [phone]  F: [phone] [x] [address]  P: [phone]  F: [phone] [] [address]  [place]   [address]   [place]: [phone]  F: [phone]      Physical Therapy Daily Treatment Note    Date:  2022  Patient Name:  Abiel Matta    :  1970  MRN: [id number]  Physician: Dr. Miranda Second: Renita Nichols (48 visits)  Medical Diagnosis: S/P L hip PATSY               Rehab Codes: M16.12  Onset date: 20               Next 's appt.: TBA  Visit# / total visits: 3/20      Cancels/No Shows: 0/0    Subjective:    Pain:  [x] Yes  [] No Location: L hip  Pain Rating: (0-10 scale) 4-5/10  Pain altered Tx:  [] No  [] Yes  Action:  Comments: Pt arrived noting a slight increase in pain, however tolerable. Did take two pain medications yesterday.      Objective:  Modalities:   Precautions:Anterior approach  Exercises:  Exercise  L PATSY Reps/ Time Weight/ Level Comments   NUSTEP 5' L1               SAQ 2x10       Quad set 10x5\" hold       Heel slide 10x   Attempt two sets next   Supine hip abd with slide board 10x   \"  \"             Standing     All in // bars   Marches 10x ea   Cues to decrease hip flex into significant pain   Heel raises 2x10       HS curls 2x10        TG suqats 2x10   LESS THAN 90 DEGREES HIP FLEX    Step ups fwd and lateral 2x10 4\"                         Other:     Vasocompression: 15' moderate compression 34deg supine with bolster     Specific Instructions for next treatment: Initiate with Nustep, progress to TG squats with less than 90deg hip flex        Treatment Charges: Mins Units   []  Modalities     []  Ther Exercise 40 3   []  Manual Therapy     []  Ther Activities     []  Aquatics     []  Vasocompression 15 1   []  Other     Total Treatment time 55 4       Assessment: [x] Progressing toward goals. Initiated treatment on NUSTEP followed by standing exercises. Progressed to step ups fwd and lateral which pt tolerated well with UE support. Concluded treatment with mat exercises followed by vasocompression to decrease post exercise edema and soreness. [] No change. [] Other:  [x] Patient would continue to benefit from skilled physical therapy services in order to: Increase LLE strength, promote an increase in ambulation progressing from RW to straight cane to no device as tolerated in order for patient to return to work. STG/LTG  STG: (to be met in 10 treatments)  1. ? Pain: Decrease pain levels to 4/10 at worst  2. ? ROM: Increase flexibility and AROM limitations throughout to equal bilat to reduce difficulty with ADLs, full L hip painfree ROM  3. ? Strength: Increase L hip flex strength to 4/5  4. ? Function: Pt to ambulate with straight cane without any gait deficits  5. Independent with Home Exercise Programs        LTG: (to be met in 20 treatments)  1. Improve score on assessment tool from 89% impaired to 40% impaired, demonstrating an improvement in ADLs  2. Reduce pain levels to 0/10  3. Pt to have increased L hip flex strength to 5/5  4. Pt to ambulate without any device and without any gait deficits                     Patient goals: To return to work      Pt. Education:  [x] Yes  [] No  [] Reviewed Prior HEP/Ed  Method of Education: [x] Verbal  [x] Demo  [x] Written  Comprehension of Education:  [x] Verbalizes understanding. [x] Demonstrates understanding. [] Needs review. [] Demonstrates/verbalizes HEP/Ed previously given.     Access Code: [email or web address]  URL: [email or web address]/  Date: 59/07/7309  Prepared by: Priti Long     Exercises  Supine Heel Slide - 1 x daily - 7 x weekly - 2 sets - 10 reps  Supine Knee Extension Strengthening - 1 x daily - 7 x weekly - 2 sets - 10 reps  Supine Quad Set on Towel Roll - 1 x daily - 7 x weekly - 2 sets - 10 reps  Heel rises with counter support - 1 x daily - 7 x weekly - 2 sets - 10 reps  Standing March with Counter Support - 1 x daily - 7 x weekly - 2 sets - 10 reps  Standing Knee Flexion - 1 x daily - 7 x weekly - 2 sets - 10 reps        Plan: [x] Continue current frequency toward long and short term goals.     [x] Specific Instructions for subsequent treatments: see above    Frequency:  3 x/week for 20 visits (3x/wk for 2-3 weeks, then decrease to 2x/wk)      Time In: 1555            Time Out: 1650    Electronically signed by:  Magno Munoz PT

## 2022-06-22 ENCOUNTER — HOSPITAL ENCOUNTER (OUTPATIENT)
Dept: PHYSICAL THERAPY | Facility: CLINIC | Age: 52
Setting detail: THERAPIES SERIES
Discharge: HOME | End: 2022-06-22
Payer: COMMERCIAL

## 2022-06-22 PROCEDURE — 97110 THERAPEUTIC EXERCISES: CPT

## 2022-06-22 PROCEDURE — 97016 VASOPNEUMATIC DEVICE THERAPY: CPT

## 2022-06-22 NOTE — FLOWSHEET NOTE
[] [facility]) [facility] &  Therapy  [address]  [phone]  F: [phone] [] [address]  [address]   [address]  P: [phone]  F: [phone] [] [address]  [address]  P: [phone]  F: [phone] [x] [address]  P: [phone]  F: [phone] [] [address]  [facility]   [address]   [place]: [phone]  F: [phone]      Physical Therapy Daily Treatment Note    Date:  2022  Patient Name:  Pearl Love    :  1970  MRN: [id number]  Physician: Dr. Chelsey Mcqueen: Kiki Friend (48 visits)  Medical Diagnosis: S/P L hip PATSY               Rehab Codes: M16.12  Onset date: 20               Next 's appt.: TBA  Visit# / total visits:       Cancels/No Shows: 0/0    Subjective:    Pain:  [x] Yes  [] No Location: L hip  Pain Rating: (0-10 scale) 4/10  Pain altered Tx:  [] No  [] Yes  Action:  Comments: Patient states that he has been able to do more at home.  Reports that he just started using the cane which has given him more mobility     Objective:  Modalities:   Precautions:Anterior approach  Exercises:  Exercise  L PATSY Reps/ Time Weight/ Level Comments Completed    NUSTEP 5' L5   x   Glute setting  2 x 10  3\"   x   SAQ 2x10     x   LAQ 2 x 10   x   Quad set 10x5\" hold     -   Heel slide 2 x 10   Active with a 3\" eccentric count  x   Supine hip abd with slide board 2 x 10     x             Standing     All in // bars    Marches 10x ea   Cues to decrease hip flex into significant pain x   Hip abduction       Hip extension       Heel raises 2x10     x   HS curls 2x10     x   TG suqats 2x10  15 LESS THAN 90 DEGREES HIP FLEX x   Step ups fwd and lateral 2x10 4\"   x         Patient goals: To return to work      Pt. Education:  [x] Yes  [] No  [] Reviewed Prior HEP/Ed  Method of Education: [x] Verbal  [x] Demo  [x] Written  Comprehension of Education:  [x] Verbalizes understanding. [x] Demonstrates understanding. [] Needs review. [] Demonstrates/verbalizes HEP/Ed previously given. Access Code: [email or web address]  URL: [email or web address]  Date: 15/27/9089  Prepared by: Eve Ford     Exercises  Supine Heel Slide - 1 x daily - 7 x weekly - 2 sets - 10 reps  Supine Knee Extension Strengthening - 1 x daily - 7 x weekly - 2 sets - 10 reps  Supine Quad Set on Towel Roll - 1 x daily - 7 x weekly - 2 sets - 10 reps  Heel rises with counter support - 1 x daily - 7 x weekly - 2 sets - 10 reps  Standing March with Counter Support - 1 x daily - 7 x weekly - 2 sets - 10 reps  Standing Knee Flexion - 1 x daily - 7 x weekly - 2 sets - 10 reps        Plan: [x] Continue current frequency toward long and short term goals. [x] Specific Instructions for subsequent treatments: see above    Frequency:  3 x/week for 20 visits (3x/wk for 2-3 weeks, then decrease to 2x/wk)      Time In: 12:05 pm            Time Out: 1:05 pm    Electronically signed by:   Balwinder Newton PT

## 2022-06-24 ENCOUNTER — HOSPITAL ENCOUNTER (OUTPATIENT)
Dept: PHYSICAL THERAPY | Facility: CLINIC | Age: 52
Setting detail: THERAPIES SERIES
Discharge: HOME | End: 2022-06-24
Payer: COMMERCIAL

## 2022-06-24 DIAGNOSIS — Z96.642 STATUS POST TOTAL HIP REPLACEMENT, LEFT: Primary | ICD-10-CM

## 2022-06-24 PROCEDURE — 97110 THERAPEUTIC EXERCISES: CPT

## 2022-06-24 PROCEDURE — 97016 VASOPNEUMATIC DEVICE THERAPY: CPT

## 2022-06-24 NOTE — FLOWSHEET NOTE
[] [facility]) [facility] &  Therapy  [address]  [phone]  F: [phone] [] [address]  [address]   [address]  P: [phone]  F: [phone] [] [address]  Therapy  [address]  P: [phone]  F: [phone] [x] [address]  P: [phone]  F: [phone] [] [address]  [facility]   [address]   [place]: [phone]  F: [phone]      Physical Therapy Daily Treatment Note    Date:  2022  Patient Name:  Francisco Javier Tan    :  1970  MRN: [id number]  Physician: Dr. Deven Parker: Madyson Urena 150 (48 visits)  Medical Diagnosis: S/P L hip PATSY               Rehab Codes: M16.12  Onset date: 20               Next 's appt.: TBA  Visit# / total visits:       Cancels/No Shows: 0/0    Subjective:    Pain:  [x] Yes  [] No Location: L hip  Pain Rating: (0-10 scale) 4/10  Pain altered Tx:  [] No  [] Yes  Action:  Comments: Patient states that he has been able to do more at home.  Reports that he just started using the cane which has given him more mobility     Objective:  Modalities:   Precautions:Anterior approach  Exercises:  Exercise  L PATSY Reps/ Time Weight/ Level Comments Completed    NUSTEP 5' L5   x   Glute setting  2 x 10  3\"   x   SAQ 2x10     x   LAQ 2 x 10 2 lbs  x   Quad set 10x5\" hold     -   Heel slide 2 x 10   Active with a 3\" eccentric count  x   Supine hip abd with slide board 2 x 10     x             Standing     All in // bars    Marches 2x10 ea   Cues to decrease hip flex into significant pain x   Hip abduction 10x OKC      Hip extension 10x OKC      Heel raises 2x10     x   HS curls 2x10     x   TG suqats 2x10  15 LESS THAN 90 DEGREES HIP FLEX x   Step ups fwd and lateral 2x10 4\"   x                         Other:     Vasocompression: 15' moderate compression 34deg supine with bolster     Specific Instructions for next treatment: Initiate with Nustep, progress to TG squats with less than 90deg hip flex        Treatment Charges: Mins Units   []  Modalities     [x]  Ther Exercise 30 2   []  Manual Therapy     []  Ther Activities     []  Aquatics     [x]  Vasocompression 15 1   [x]  Other: Gait      Total Treatment time 45 3       Assessment: [x] Progressing toward goals. Pt continues to present with good tolerance and minor increase in pain with new activities. Added standing hip abduction and hip extension OKC within pain tolerance to improve hip strength. Progressed LAQ and SAQ with weights to improve quad strength. Pt concluded session with vasocompression to decrease pain and soreness. [] No change. [] Other:  [x] Patient would continue to benefit from skilled physical therapy services in order to: Increase LLE strength, promote an increase in ambulation progressing from RW to straight cane to no device as tolerated in order for patient to return to work. STG/LTG  STG: (to be met in 10 treatments)  1. ? Pain: Decrease pain levels to 4/10 at worst  2. ? ROM: Increase flexibility and AROM limitations throughout to equal bilat to reduce difficulty with ADLs, full L hip painfree ROM  3. ? Strength: Increase L hip flex strength to 4/5  4. ? Function: Pt to ambulate with straight cane without any gait deficits  5. Independent with Home Exercise Programs        LTG: (to be met in 20 treatments)  1. Improve score on assessment tool from 89% impaired to 40% impaired, demonstrating an improvement in ADLs  2. Reduce pain levels to 0/10  3. Pt to have increased L hip flex strength to 5/5  4. Pt to ambulate without any device and without any gait deficits                     Patient goals: To return to work      Pt.  Education:  [x] Yes  [] No  [] Reviewed Prior HEP/Ed  Method of Education: [x] Verbal  [x] Demo  [x] Written  Comprehension of Education:  [x] Verbalizes understanding. [x] Demonstrates understanding. [] Needs review. [] Demonstrates/verbalizes HEP/Ed previously given. Access Code: [email or web address]  URL: [email or web address]  Date: 49/97/0685  Prepared by: Cassie Ro     Exercises  Supine Heel Slide - 1 x daily - 7 x weekly - 2 sets - 10 reps  Supine Knee Extension Strengthening - 1 x daily - 7 x weekly - 2 sets - 10 reps  Supine Quad Set on Towel Roll - 1 x daily - 7 x weekly - 2 sets - 10 reps  Heel rises with counter support - 1 x daily - 7 x weekly - 2 sets - 10 reps  Standing March with Counter Support - 1 x daily - 7 x weekly - 2 sets - 10 reps  Standing Knee Flexion - 1 x daily - 7 x weekly - 2 sets - 10 reps        Plan: [x] Continue current frequency toward long and short term goals.     [x] Specific Instructions for subsequent treatments: see above    Frequency:  3 x/week for 20 visits (3x/wk for 2-3 weeks, then decrease to 2x/wk)      Time In: 1058            Time Out: 1148    Electronically signed by:  Aileen Taylor PTA

## 2022-06-27 ENCOUNTER — OFFICE VISIT (OUTPATIENT)
Dept: ORTHOPEDIC SURGERY | Age: 52
End: 2022-06-27

## 2022-06-27 VITALS — HEIGHT: 69 IN | BODY MASS INDEX: 24.88 KG/M2 | WEIGHT: 168 LBS

## 2022-06-27 DIAGNOSIS — M16.12 ARTHRITIS OF LEFT HIP: Primary | ICD-10-CM

## 2022-06-27 DIAGNOSIS — Z96.642 S/P TOTAL LEFT HIP ARTHROPLASTY: ICD-10-CM

## 2022-06-27 PROCEDURE — 99024 POSTOP FOLLOW-UP VISIT: CPT | Performed by: ORTHOPAEDIC SURGERY

## 2022-06-27 ASSESSMENT — ENCOUNTER SYMPTOMS
ROS SKIN COMMENTS: NEGATIVE FOR RASH
ABDOMINAL PAIN: 0
EYE DISCHARGE: 0
SHORTNESS OF BREATH: 0

## 2022-06-27 NOTE — PROGRESS NOTES
[address]  [address] [facility] [address] [place]  Dept: [phone]  Dept Fax: [phone]        Postoperative follow-up note    Subjective:   Cody Rey is a 46y.o. year old male who presents to our office today for postoperative followup regarding his   1. Arthritis of left hip    2. S/P total left hip arthroplasty    . Chief Complaint   Patient presents with    Post-Op Check     LEFT TOTAL HIP 06/13/22     Mr. Shawn Min is a 57-year-old male who presents to the office today for recheck status post left total hip arthroplasty through an anterior approach on 6/13/2022. He is walking with a cane. He states his pain is significantly better than preoperative. Review of Systems   Constitutional: Positive for activity change. Negative for fever. HENT: Negative for dental problem. Eyes: Negative for discharge. Respiratory: Negative for shortness of breath. Cardiovascular: Negative for chest pain. Gastrointestinal: Negative for abdominal pain. Genitourinary: Negative. Musculoskeletal: Positive for arthralgias. Skin:        Negative for rash   Neurological: Positive for weakness. Psychiatric/Behavioral: Negative for confusion. I have reviewed the CC, HPI, ROS, PMH, FHX, Social History, and if not present in this note, I have reviewed in the patient's chart. I agree with the documentation provided by other staff and have reviewed their documentation prior to providing my signature indicating agreement. Objective :   General: Cody Rey is a 46 y.o. male who is alert and oriented and sitting comfortably in our office. Ortho Exam  MS:   Left anterior hip incisions healing well without signs of infection. Patient ambulates with mild short weightbearing phase but no antalgic gait to the left lower extremity.   Motor, sensory, vascular examination of the left lower extremity is grossly intact without focal deficits. Neuro: alert. oriented  Eyes: Extra-ocular muscles intact  Mouth: Oral mucosa moist. No perioral lesions  Pulm: Respirations unlabored and regular. Skin: warm, well perfused  Psych:   Patient has good fund of knowledge and displays understanging of exam, diagnosis, and plan. Radiology: XR HIP LEFT (2-3 VIEWS)    Result Date: 6/27/2022  History:   Status post Left  total hip arthroplasty Findings:    Low AP pelvis, AP Left  hip, cross table lateral xrays Left hip done in the office today shows total hip arthroplasty in good position without signs complication. Leg lengths are approximate. Components are in good position without signs of loosening. No evidence of fracture, subluxation, dislocation, radioopaque foreign body/tumor is noted. Impression:  Left  total hip arthroplasty in good position without complication noted. FLUORO FOR SURGICAL PROCEDURES    Result Date: 6/13/2022  Radiology exam is complete. No Radiologist dictation. Please follow up with ordering provider. XR HIP 2-3 VW W PELVIS LEFT    Result Date: 6/13/2022  EXAMINATION: ONE XRAY VIEW OF THE PELVIS AND TWO XRAY VIEWS LEFT HIP 6/13/2022 10:02 am COMPARISON: None. HISTORY: ORDERING SYSTEM PROVIDED HISTORY: post op please do in pacu TECHNOLOGIST PROVIDED HISTORY: post op please do in pacu Reason for Exam: Pt eval for f/u FINDINGS: The patient has undergone recent left hip arthroplasty. The hardware appears properly placed without complication. There is subcutaneous air keeping with recent surgical intervention. Status post left hip arthroplasty. Assessment:      1. Arthritis of left hip    2. S/P total left hip arthroplasty         Plan:      Patient is doing well. He can continue the rehab process. He should not soak his incision over the next 4 weeks. I plan to see the patient back in 4 weeks or sooner as necessary. Follow up: Return in about 4 weeks (around 7/25/2022) for clinical assessment.  No XR needed. .    No orders of the defined types were placed in this encounter. No orders of the defined types were placed in this encounter.       This note is created with the assistance of a speech recognition program.  While intending to generate a document that actually reflects the content of the visit, the document can still have some errors including those of syntax and sound a like substitutions which may escape proof reading.  In such instances, actual meaning can be extrapolated by contextual diversion      Electronically signed by Hemalatha Durham on 6/27/2022 at 4:41 PM

## 2022-06-28 ENCOUNTER — HOSPITAL ENCOUNTER (OUTPATIENT)
Dept: PHYSICAL THERAPY | Facility: CLINIC | Age: 52
Setting detail: THERAPIES SERIES
Discharge: HOME | End: 2022-06-28
Payer: COMMERCIAL

## 2022-06-28 PROCEDURE — 97110 THERAPEUTIC EXERCISES: CPT

## 2022-06-28 PROCEDURE — 97016 VASOPNEUMATIC DEVICE THERAPY: CPT

## 2022-06-28 NOTE — FLOWSHEET NOTE
[] [facility]) [facility] &  Therapy  [address]  [phone]  F: [phone] [] [address]  [address]   [address]  P: [phone]  F: [phone] [] [place]  Therapy  [address]  P: [phone]  F: [phone] [x] [address]  P: [phone]  F: [phone] [] [address]  [facility]   [address]   [place]: [phone]  F: [phone]      Physical Therapy Daily Treatment Note    Date:  2022  Patient Name:  Tiffanie Wade    :  1970  MRN: [id number]  Physician: Dr. Cota Laws: Fady Tan (48 visits)  Medical Diagnosis: S/P L hip PATSY               Rehab Codes: M16.12  Onset date: 20               Next 's appt.: TBA  Visit# / total visits:       Cancels/No Shows: 0/0    Subjective:    Pain:  [x] Yes  [] No Location: L hip  Pain Rating: (0-10 scale) 4/10  Pain altered Tx:  [] No  [] Yes  Action:  Comments: Pt stated that he had a follow up with doctor yesterday, with doctor stating that everything looked good and to continue therapy. Pt noted increased soreness and tightness in anterior hip near incisions.      Objective:  Modalities:   Precautions:Anterior approach  Exercises:  Exercise  L PATSY Reps/ Time Weight/ Level Comments Completed    NUSTEP 5' L5   x   Glute setting  2 x 10  3\"   x   SAQ 2x10  3 lbs   x   LAQ 2 x 10 3 lbs  x   Quad set 10x5\" hold     -   Heel slide 2 x 10   Active with a 3\" eccentric count  x   Supine hip abd with slide board 2 x 10     x             Standing     All in // bars    Marches 2x10 ea   Cues to decrease hip flex into significant pain x   Hip abduction 10x OKC, CKC   x   Hip extension 10x OKC, CKC  Minimal range per protocol for OKC x   Heel raises 2x10     x   HS curls 2x10     x   TG suqats 2x10  18 LESS THAN 90 DEGREES HIP FLEX x   Step ups fwd and lateral 2x10 4\"   x    Tandem balance  2x30\"   L foot forward x    Quad stretch 2x20\"   At stairs x   Other:     Vasocompression: 15' moderate compression 34deg supine with bolster     Specific Instructions for next treatment: Initiate with Nustep, progress to TG squats with less than 90deg hip flex        Treatment Charges: Mins Units   []  Modalities     [x]  Ther Exercise 55 4   []  Manual Therapy     []  Ther Activities     []  Aquatics     [x]  Vasocompression 15 1   [x]  Other: Gait      Total Treatment time 70 5       Assessment: [x] Progressing toward goals. Added balance activities to improve stability. Added CKC per protocol and pt tolerance to improve LE strength. Added quad stretching to decrease muscle tension. Pt concluded treatment with vasocompression to decrease pain and soreness. [] No change. [] Other:  [x] Patient would continue to benefit from skilled physical therapy services in order to: Increase LLE strength, promote an increase in ambulation progressing from RW to straight cane to no device as tolerated in order for patient to return to work. STG/LTG  STG: (to be met in 10 treatments)  1. ? Pain: Decrease pain levels to 4/10 at worst  2. ? ROM: Increase flexibility and AROM limitations throughout to equal bilat to reduce difficulty with ADLs, full L hip painfree ROM  3. ? Strength: Increase L hip flex strength to 4/5  4. ? Function: Pt to ambulate with straight cane without any gait deficits  5. Independent with Home Exercise Programs        LTG: (to be met in 20 treatments)  1. Improve score on assessment tool from 89% impaired to 40% impaired, demonstrating an improvement in ADLs  2. Reduce pain levels to 0/10  3. Pt to have increased L hip flex strength to 5/5  4. Pt to ambulate without any device and without any gait deficits                     Patient goals:  To return to work      Pt. Education:  [x] Yes  [] No  [] Reviewed Prior HEP/Ed  Method of Education: [x] Verbal  [x] Demo  [x] Written  Comprehension of Education:  [x] Verbalizes understanding. [x] Demonstrates understanding. [] Needs review. [] Demonstrates/verbalizes HEP/Ed previously given. Access Code: [email or web address]  URL: [email or web address]  Date: 61/02/6264  Prepared by: Jennyfer Becker     Exercises  Supine Heel Slide - 1 x daily - 7 x weekly - 2 sets - 10 reps  Supine Knee Extension Strengthening - 1 x daily - 7 x weekly - 2 sets - 10 reps  Supine Quad Set on Towel Roll - 1 x daily - 7 x weekly - 2 sets - 10 reps  Heel rises with counter support - 1 x daily - 7 x weekly - 2 sets - 10 reps  Standing March with Counter Support - 1 x daily - 7 x weekly - 2 sets - 10 reps  Standing Knee Flexion - 1 x daily - 7 x weekly - 2 sets - 10 reps        Plan: [x] Continue current frequency toward long and short term goals.     [x] Specific Instructions for subsequent treatments: see above    Frequency:  3 x/week for 20 visits (3x/wk for 2-3 weeks, then decrease to 2x/wk)      Time In: 1000            Time Out: 1115    Electronically signed by:  Ning Mc PTA

## 2022-06-29 ENCOUNTER — HOSPITAL ENCOUNTER (OUTPATIENT)
Dept: PHYSICAL THERAPY | Facility: CLINIC | Age: 52
Setting detail: THERAPIES SERIES
Discharge: HOME | End: 2022-06-29
Payer: COMMERCIAL

## 2022-06-29 PROCEDURE — 97110 THERAPEUTIC EXERCISES: CPT

## 2022-06-29 PROCEDURE — 97016 VASOPNEUMATIC DEVICE THERAPY: CPT

## 2022-06-29 NOTE — FLOWSHEET NOTE
[] [facility]) [facility] &  Therapy  [address]  [phone]  F: [phone] [] [address]  [place] [address]   [address]  P: [phone]  F: [phone] [] [address]  [address]  P: [phone]  F: [phone] [x] [address]  P: [phone]  F: [phone] [] [address]  [facility]   [address]   [place]: [phone]  F: [phone]      Physical Therapy Daily Treatment Note    Date:  2022  Patient Name:  Inés Brennan    :  1970  MRN: [id number]  Physician: Dr. Carmel Franklin: Madyson Urena 150 (48 visits)  Medical Diagnosis: S/P L hip PATSY               Rehab Codes: M16.12  Onset date: 20               Next 's appt.: TBA  Visit# / total visits:       Cancels/No Shows: 0/0    Subjective:    Pain:  [x] Yes  [] No Location: L hip  Pain Rating: (0-10 scale) 2/10  Pain altered Tx:  [] No  [] Yes  Action:  Comments: Pt arrives with straight cane, states to using primarily. Had bandage removed two days ago at 's appt and has soreness from that, otherwise no increase in pain.      Objective:  Modalities:   Precautions:Anterior approach  Exercises:  Exercise  L PATSY Reps/ Time Weight/ Level Comments Completed    NUSTEP 8' L5   x   Glute setting  2 x 10  3\"   -   SAQ 2x10  3 lbs   x   LAQ 2 x 10 3 lbs  x   Quad set 10x5\" hold     -   Heel slide 2 x 10   Active with a 3\" eccentric count  x   Supine hip abd with slide board 2 x 10     -   Sidelying hip abd 2x10  Added  x             Standing     All in // bars    Marches 2x10 ea   Cues to decrease hip flex into significant pain x   Hip abduction 10x OKC, CKC   x   Hip extension 10x OKC, CKC  Minimal range per protocol for OKC x   Heel raises 2x10     x   HS curls 2x10     x   TG suqats 2x10  18 LESS THAN 90 DEGREES HIP FLEX x   Step ups fwd and lateral 2x10 6\"   x    Tandem balance  2x30\"   L foot forward x    Quad stretch 2x20\"   At stairs x   Squat tap to high chair 2x10   x   Other:     Vasocompression: 15' moderate compression 34deg supine with bolster     Specific Instructions for next treatment: Initiate with Nustep, progress to TG squats with less than 90deg hip flex        Treatment Charges: Mins Units   []  Modalities     [x]  Ther Exercise 40 3   []  Manual Therapy     []  Ther Activities     []  Aquatics     [x]  Vasocompression 15 1   [x]  Other: Gait      Total Treatment time 55 4       Assessment: [x] Progressing toward goals. Initiated on Anila Uriel followed by standing exercises. Progressed hip strengthening via adding in squat taps to chair which pt tolerated well and performed with correct technique. Also progressed from supine hip abd to side lying hip abd which pt performed without pain, however did notice fatigue. [] No change. [] Other:  [x] Patient would continue to benefit from skilled physical therapy services in order to: Increase LLE strength, promote an increase in ambulation progressing from RW to straight cane to no device as tolerated in order for patient to return to work. STG/LTG  STG: (to be met in 10 treatments)  1. ? Pain: Decrease pain levels to 4/10 at worst  2. ? ROM: Increase flexibility and AROM limitations throughout to equal bilat to reduce difficulty with ADLs, full L hip painfree ROM  3. ? Strength: Increase L hip flex strength to 4/5  4. ? Function: Pt to ambulate with straight cane without any gait deficits  5. Independent with Home Exercise Programs        LTG: (to be met in 20 treatments)  1. Improve score on assessment tool from 89% impaired to 40% impaired, demonstrating an improvement in ADLs  2. Reduce pain levels to 0/10  3. Pt to have increased L hip flex strength to 5/5  4.  Pt to ambulate without any device and without any gait deficits                     Patient goals: To return to work      Pt. Education:  [x] Yes  [] No  [] Reviewed Prior HEP/Ed  Method of Education: [x] Verbal  [x] Demo  [x] Written  Comprehension of Education:  [x] Verbalizes understanding. [x] Demonstrates understanding. [] Needs review. [] Demonstrates/verbalizes HEP/Ed previously given. Access Code: [email or web address]  URL: [email or web address]  Date: 77/04/8433  Prepared by: Carine Zaidi     Exercises  Supine Heel Slide - 1 x daily - 7 x weekly - 2 sets - 10 reps  Supine Knee Extension Strengthening - 1 x daily - 7 x weekly - 2 sets - 10 reps  Supine Quad Set on Towel Roll - 1 x daily - 7 x weekly - 2 sets - 10 reps  Heel rises with counter support - 1 x daily - 7 x weekly - 2 sets - 10 reps  Standing March with Counter Support - 1 x daily - 7 x weekly - 2 sets - 10 reps  Standing Knee Flexion - 1 x daily - 7 x weekly - 2 sets - 10 reps        Plan: [x] Continue current frequency toward long and short term goals.     [x] Specific Instructions for subsequent treatments: see above    Frequency:  3 x/week for 20 visits (3x/wk for 2-3 weeks, then decrease to 2x/wk)      Time In: 1100            Time Out: 1155    Electronically signed by:  Alfreda Soriano PT

## 2022-07-01 ENCOUNTER — HOSPITAL ENCOUNTER (OUTPATIENT)
Dept: PHYSICAL THERAPY | Facility: CLINIC | Age: 52
Setting detail: THERAPIES SERIES
Discharge: HOME | End: 2022-07-01
Payer: COMMERCIAL

## 2022-07-01 NOTE — FLOWSHEET NOTE
[] [address]  [address]    [phone]  F: [phone]   [] [address]  [facility] [address]   [address]  P: [phone]  F: [phone]  [] [address]  [address] [facility]  P: [phone]  F: [phone] [x] [address]  P: [phone]  F: [phone]  [] [address]  [address] [facility] [address]   [address]   [place]: [phone]  F: [phone]   [] [address]  [place]: [phone]   F: [phone]     Physical Therapy Cancel/No Show note    Date: 2022  Patient: Heavenly Butler  : 1970  MRN: [id number]    Cancels/No Shows to date:     For today's appointment patient:    [x]  Cancelled    [] Rescheduled appointment    [] No-show     Reason given by patient:    []  Patient ill    []  Conflicting appointment    [] No transportation      [] Conflict with work    [x] No reason given    [] Weather related    []     [] Other:      Comments:        [x] Next appointment was confirmed    Electronically signed by: Flower Pate

## 2022-07-05 ENCOUNTER — HOSPITAL ENCOUNTER (OUTPATIENT)
Dept: PHYSICAL THERAPY | Facility: CLINIC | Age: 52
Setting detail: THERAPIES SERIES
Discharge: HOME | End: 2022-07-05
Payer: COMMERCIAL

## 2022-07-05 PROCEDURE — 97110 THERAPEUTIC EXERCISES: CPT

## 2022-07-05 NOTE — FLOWSHEET NOTE
[] [facility]) - [facility] &  Therapy  [address]  [phone]  F: [phone] [] [address]  [place] [address]   [address]  P: [phone]  F: [phone] [] [place]  [facility]  [address]  P: [phone]  F: [phone] [x] [address]  P: [phone]  F: [phone] [] [address]  [facility]   [address]   [place]: [phone]  F: [phone]      Physical Therapy Daily Treatment Note    Date:  2022  Patient Name:  Martine Paulson    :  1970  MRN: [id number]  Physician: Dr. Ruma Johns: Lesley Adam (48 visits)  Medical Diagnosis: S/P L hip PATSY               Rehab Codes: M16.12  Onset date: 20               Next 's appt.: TBA  Visit# / total visits:       Cancels/No Shows: 1/0    Subjective:    Pain:  [x] Yes  [] No Location: L hip  Pain Rating: (0-10 scale) 2/10  Pain altered Tx:  [] No  [] Yes  Action:  Comments: Pt reported that he rode his bike to therapy, 25 minutes. Pt noted that he is not having any pain, can have minor pain after sitting for a long time to standing.      Objective:  Modalities:   Precautions:Anterior approach SURGERY DATE 22  Exercises:  Exercise  L PATSY Reps/ Time Weight/ Level Comments Completed    NUSTEP 8' L5  Held  due to pt biking to PT x   Glute setting  2 x 10  3\"   -   SAQ 2x10  3 lbs   x   LAQ 2 x 10 3 lbs  x   Quad set 10x5\" hold     -   Heel slide 2 x 10   Active with a 3\" eccentric count  -   Supine hip abd with slide board 2 x 10     -   Sidelying hip abd 2x10  Added  x             Standing     All in // bars    Marches 2x10 ea   Cues to decrease hip flex into significant pain x   Hip abduction 10x OKC, CKC   x   Hip extension 10x OKC, CKC  Minimal range per protocol for OKC x   Heel raises 2x10     x   HS curls 2x10     x   TG suqats 2x10  18 LESS THAN 90 DEGREES HIP FLEX -   Leg press 2x10 10 lbs Fully extended as to not pass 90 degrees hip flexion x   Step ups fwd and lateral 2x10 6\"   x    Tandem balance  2x30\"   L foot forward, foam, Eyes closed x   SLS 2x30\" ea  With B UE assist x    Quad stretch 2x20\"   At stairs x   Squat tap to high chair 2x10   x   Other:     Vasocompression: 15' moderate compression 34deg supine with bolster     Specific Instructions for next treatment: Initiate with Nustep, progress to TG squats with less than 90deg hip flex        Treatment Charges: Mins Units   []  Modalities     [x]  Ther Exercise 40 3   []  Manual Therapy     []  Ther Activities     []  Aquatics     [x]  Vasocompression 15 1   [x]  Other: Gait      Total Treatment time 55 4       Assessment: [x] Progressing toward goals. Held nustep due to pt biking to therapy today. Progressed activities within protocol to increase LE strength and endurance. Added Leg press fully extended to improve LE strength without flexing hip to 90 degrees. Continued with balance training to improve pt stability and decrease risk of falls. [] No change. [] Other:  [x] Patient would continue to benefit from skilled physical therapy services in order to: Increase LLE strength, promote an increase in ambulation progressing from RW to straight cane to no device as tolerated in order for patient to return to work. STG/LTG  STG: (to be met in 10 treatments)  1. ? Pain: Decrease pain levels to 4/10 at worst  2. ? ROM: Increase flexibility and AROM limitations throughout to equal bilat to reduce difficulty with ADLs, full L hip painfree ROM  3. ? Strength: Increase L hip flex strength to 4/5  4. ? Function: Pt to ambulate with straight cane without any gait deficits  5. Independent with Home Exercise Programs        LTG: (to be met in 20 treatments)  1.  Improve score

## 2022-07-07 ENCOUNTER — HOSPITAL ENCOUNTER (OUTPATIENT)
Dept: PHYSICAL THERAPY | Facility: CLINIC | Age: 52
Setting detail: THERAPIES SERIES
Discharge: HOME | End: 2022-07-07
Payer: COMMERCIAL

## 2022-07-07 PROCEDURE — 97140 MANUAL THERAPY 1/> REGIONS: CPT

## 2022-07-07 PROCEDURE — 97110 THERAPEUTIC EXERCISES: CPT

## 2022-07-07 NOTE — FLOWSHEET NOTE
CKC  Minimal range per protocol for OKC x   Heel raises 2x10     x   HS curls 2x10     x   TG suqats 2x10  18 LESS THAN 90 DEGREES HIP FLEX -   Leg press 2x10 20 lbs Fully extended as to not pass 90 degrees hip flexion x   Step ups fwd and lateral 2x10 6\"   x    Tandem balance  2x30\"   L foot forward, foam, Eyes closed x   SLS 2x30\" ea  With B UE assist x    Quad stretch 2x20\"   At stairs x   Squat tap to high chair 2x10   x   Lateral walks 2 laps orange  x   Monster walks 2 laps orange  x   Other:     Vasocompression: 15' moderate compression 34deg supine with bolster     Specific Instructions for next treatment: Initiate with Nustep, progress to TG squats with less than 90deg hip flex        Treatment Charges: Mins Units   []  Modalities     [x]  Ther Exercise 44 3   []  Manual Therapy 10 1   []  Ther Activities     []  Aquatics     [x]  Vasocompression     [x]  Other: Gait      Total Treatment time 54 4       Assessment: [x] Progressing toward goals. Pt began treatment with standing activities, balance activities and then concluded with table exercises. Increased resistance for all standing activities to improve LE strength. Pt with good tolerance to balance activities with minor to moderate need for self correction with UE assist. Added resisted walks to improve hip strength. Pt received manual to L quad to decrease muscular tension. Pt continues to note good tolerance with minor fatigue and soreness post treatment. Declined vasocompression stating he would complete at home. [] No change. [] Other:  [x] Patient would continue to benefit from skilled physical therapy services in order to: Increase LLE strength, promote an increase in ambulation progressing from RW to straight cane to no device as tolerated in order for patient to return to work.      STG/LTG  STG: (to be met in 10 treatments)  1. ? Pain: Decrease pain levels to 4/10 at worst  2. ? ROM: Increase flexibility and AROM limitations throughout to equal bilat to reduce difficulty with ADLs, full L hip painfree ROM  3. ? Strength: Increase L hip flex strength to 4/5  4. ? Function: Pt to ambulate with straight cane without any gait deficits  5. Independent with Home Exercise Programs         LTG: (to be met in 20 treatments)  1. Improve score on assessment tool from 89% impaired to 40% impaired, demonstrating an improvement in ADLs  2. Reduce pain levels to 0/10  3. Pt to have increased L hip flex strength to 5/5  4. Pt to ambulate without any device and without any gait deficits                     Patient goals: To return to work      Pt. Education:  [x] Yes  [] No  [] Reviewed Prior HEP/Ed  Method of Education: [x] Verbal  [x] Demo  [x] Written  Comprehension of Education:  [x] Verbalizes understanding. [x] Demonstrates understanding. [] Needs review. [] Demonstrates/verbalizes HEP/Ed previously given. Access Code: [email or web address]  URL: [email or web address]  Date: 16/00/3698  Prepared by: Alli Yoo     Exercises  Supine Heel Slide - 1 x daily - 7 x weekly - 2 sets - 10 reps  Supine Knee Extension Strengthening - 1 x daily - 7 x weekly - 2 sets - 10 reps  Supine Quad Set on Towel Roll - 1 x daily - 7 x weekly - 2 sets - 10 reps  Heel rises with counter support - 1 x daily - 7 x weekly - 2 sets - 10 reps  Standing March with Counter Support - 1 x daily - 7 x weekly - 2 sets - 10 reps  Standing Knee Flexion - 1 x daily - 7 x weekly - 2 sets - 10 reps        Plan: [x] Continue current frequency toward long and short term goals.     [x] Specific Instructions for subsequent treatments: see above    Frequency:  3 x/week for 20 visits (3x/wk for 2-3 weeks, then decrease to 2x/wk)      Time In: 5719            Time Out: 8727    Electronically signed by:  Jay Jay Perez PTA

## 2022-07-08 ENCOUNTER — HOSPITAL ENCOUNTER (OUTPATIENT)
Dept: PHYSICAL THERAPY | Facility: CLINIC | Age: 52
Setting detail: THERAPIES SERIES
Discharge: HOME | End: 2022-07-08
Payer: COMMERCIAL

## 2022-07-08 PROCEDURE — 97110 THERAPEUTIC EXERCISES: CPT

## 2022-07-08 NOTE — FLOWSHEET NOTE
[] [facility]) [facility] &  Therapy  [address]  [phone]  F: [phone] [] [address]  [place] [address]   [address]  P: [phone]  F: [phone] [] [address]  [address]  P: [phone]  F: [phone] [x] [address]  P: [phone]  F: [phone] [] [address]  [facility]   [address]   [place]: [phone]  F: [phone]      Physical Therapy Daily Treatment Note    Date:  2022  Patient Name:  Abelardo Rosario    :  1970  MRN: [id number]  Physician: Dr. Teena Mas: Madyson Urena 150 (48 visits)  Medical Diagnosis: S/P L hip PATSY               Rehab Codes: M16.12  Onset date: 20               Next 's appt.: TBA  Visit# / total visits:       Cancels/No Shows: 1/0    Subjective:    Pain:  [x] Yes  [] No Location: L hip  Pain Rating: (0-10 scale) 2/10  Pain altered Tx:  [] No  [] Yes  Action:  Comments: Pt noted that he was a little sore last night after therapy but this morning has felt the best he has in a long time.      Objective:  Modalities:   Precautions:Anterior approach SURGERY DATE 22  Exercises:  Exercise  L PATSY Reps/ Time Weight/ Level Comments Completed    NUSTEP 8' L5  Held  due to pt biking to PT x   Glute setting  2 x 10  3\"   -   SAQ 2x10  4 lbs   -   LAQ 2 x 10 5 lbs  x   Quad set 10x5\" hold     -   Heel slide 2 x 10   Active with a 3\" eccentric count  -   Supine hip abd with slide board 2 x 10     -   Sidelying hip abd 2x10 lime Added  x             Standing     All in // bars    Marches 2x10 ea  3 lbs Cues to decrease hip flex into significant pain x   Hip abduction 2x10 ea 3 lbs  x   Hip flexion 2x10 ea 3 lbs Minimal range per protocol for 40% impaired, demonstrating an improvement in ADLs  2. Reduce pain levels to 0/10  3. Pt to have increased L hip flex strength to 5/5  4. Pt to ambulate without any device and without any gait deficits                     Patient goals: To return to work      Pt. Education:  [x] Yes  [] No  [] Reviewed Prior HEP/Ed  Method of Education: [x] Verbal  [x] Demo  [x] Written  Comprehension of Education:  [x] Verbalizes understanding. [x] Demonstrates understanding. [] Needs review. [] Demonstrates/verbalizes HEP/Ed previously given. Access Code: [email or web address]  URL: [email or web address]  Date: 13/34/3348  Prepared by: Jody Balloon     Exercises  Supine Heel Slide - 1 x daily - 7 x weekly - 2 sets - 10 reps  Supine Knee Extension Strengthening - 1 x daily - 7 x weekly - 2 sets - 10 reps  Supine Quad Set on Towel Roll - 1 x daily - 7 x weekly - 2 sets - 10 reps  Heel rises with counter support - 1 x daily - 7 x weekly - 2 sets - 10 reps  Standing March with Counter Support - 1 x daily - 7 x weekly - 2 sets - 10 reps  Standing Knee Flexion - 1 x daily - 7 x weekly - 2 sets - 10 reps        Plan: [x] Continue current frequency toward long and short term goals.     [x] Specific Instructions for subsequent treatments: see above    Frequency:  3 x/week for 20 visits (3x/wk for 2-3 weeks, then decrease to 2x/wk)      Time In: 1106            Time Out: 1145    Electronically signed by:  Bharathi Vizcarra PTA

## 2022-07-11 ENCOUNTER — HOSPITAL ENCOUNTER (OUTPATIENT)
Dept: PHYSICAL THERAPY | Facility: CLINIC | Age: 52
Setting detail: THERAPIES SERIES
Discharge: HOME | End: 2022-07-11
Payer: COMMERCIAL

## 2022-07-11 PROCEDURE — 97110 THERAPEUTIC EXERCISES: CPT

## 2022-07-11 NOTE — FLOWSHEET NOTE
pain x   Hip abduction 2x10 ea 3 lbs  x   Hip flexion 2x10 ea 3 lbs Minimal range per protocol for OKC x   Heel raises 2x10  3 lbs   x   HS curls 2x10  3 lbs   x   TG suqats 2x10  18 LESS THAN 90 DEGREES HIP FLEX x   Leg press 2x10 20 lbs Fully extended as to not pass 90 degrees hip flexion x   Step ups fwd and lateral 2x10 8\" 3 lbs    x    Tandem balance  2x30\"   L foot forward, foam, Eyes closed -   SLS 2x30\" ea  With B UE assist x    Quad stretch 2x20\"   At stairs x   Squat tap to high chair 2x10   x   Lateral walks 2 laps lime  x   Monster walks 2 laps lime  x   Heel taps 2x10 4\" Added 7/11 x   Other:     Vasocompression: 15' moderate compression 34deg supine with bolster     Specific Instructions for next treatment: Progress to hip abd with tband (avoid excessive hip ext)        Treatment Charges: Mins Units   []  Modalities     [x]  Ther Exercise 35 2   []  Manual Therapy     []  Ther Activities     []  Aquatics     []  Vasocompression     []  Other: Gait      Total Treatment time 35 2       Assessment: [x] Progressing toward goals. Initated treatment with standing exercises with 3# weight which pt tolerated well without any pain. Progressed to include heel taps on 4\" step which pt noted difficulty however able to complete. Also progressed mat exercises to include clamshells and bridges. Pt wishes to ice at home. [] No change. [] Other:  [x] Patient would continue to benefit from skilled physical therapy services in order to: Increase LLE strength, promote an increase in ambulation progressing from RW to straight cane to no device as tolerated in order for patient to return to work. STG/LTG  STG: (to be met in 10 treatments)  1. ? Pain: Decrease pain levels to 4/10 at worst  2. ? ROM: Increase flexibility and AROM limitations throughout to equal bilat to reduce difficulty with ADLs, full L hip painfree ROM  3. ? Strength:  Increase L hip flex strength to 4/5  4. ? Function: Pt to ambulate with straight cane without any gait deficits  5. Independent with Home Exercise Programs         LTG: (to be met in 20 treatments)  1. Improve score on assessment tool from 89% impaired to 40% impaired, demonstrating an improvement in ADLs  2. Reduce pain levels to 0/10  3. Pt to have increased L hip flex strength to 5/5  4. Pt to ambulate without any device and without any gait deficits                     Patient goals: To return to work      Pt. Education:  [x] Yes  [] No  [] Reviewed Prior HEP/Ed  Method of Education: [x] Verbal  [x] Demo  [x] Written  Comprehension of Education:  [x] Verbalizes understanding. [x] Demonstrates understanding. [] Needs review. [] Demonstrates/verbalizes HEP/Ed previously given. Access Code: [email or web address]  URL: [email or web address]  Date: 84/84/1066  Prepared by: Scottie Woodard     Exercises  Supine Heel Slide - 1 x daily - 7 x weekly - 2 sets - 10 reps  Supine Knee Extension Strengthening - 1 x daily - 7 x weekly - 2 sets - 10 reps  Supine Quad Set on Towel Roll - 1 x daily - 7 x weekly - 2 sets - 10 reps  Heel rises with counter support - 1 x daily - 7 x weekly - 2 sets - 10 reps  Standing March with Counter Support - 1 x daily - 7 x weekly - 2 sets - 10 reps  Standing Knee Flexion - 1 x daily - 7 x weekly - 2 sets - 10 reps        Plan: [x] Continue current frequency toward long and short term goals.     [x] Specific Instructions for subsequent treatments: see above    Frequency:  3 x/week for 20 visits (3x/wk for 2-3 weeks, then decrease to 2x/wk)      Time In: 1300            Time Out: 1330    Electronically signed by:  Bre Holliday, PT

## 2022-07-14 ENCOUNTER — HOSPITAL ENCOUNTER (OUTPATIENT)
Dept: PHYSICAL THERAPY | Facility: CLINIC | Age: 52
Setting detail: THERAPIES SERIES
Discharge: HOME | End: 2022-07-14
Payer: COMMERCIAL

## 2022-07-14 NOTE — FLOWSHEET NOTE
[] [facility]) [facility] &  Therapy  [address]    [phone]  F: [phone]   [] [address]  [facility] [address]   [address]  P: [phone]  F: [phone]  [] [address]  [address]  P: [phone]  F: [phone] [x] [address]  P: [phone]  F: [phone]  [] [address]  [address] [facility] [address]   [address]   [place]: [phone]  F: [phone]   [] [facility]  [address]  [place]: [phone]   F: [phone]     Physical Therapy Cancel/No Show note    Date: 2022  Patient: Ruben Martinez  : 1970  MRN: [id number]    Cancels/No Shows to date: 20    For today's appointment patient:    [x]  Cancelled    [x] Rescheduled appointment    [] No-show     Reason given by patient:    []  Patient ill    []  Conflicting appointment    [] No transportation      [] Conflict with work    [x] No reason given    [] Weather related    [] COVID-19    [] Other:      Comments:        [x] Next appointment was confirmed    Electronically signed by: Bharathi Vizcarra PTA

## 2022-07-18 ENCOUNTER — HOSPITAL ENCOUNTER (OUTPATIENT)
Dept: PHYSICAL THERAPY | Facility: CLINIC | Age: 52
Setting detail: THERAPIES SERIES
Discharge: HOME | End: 2022-07-18
Payer: COMMERCIAL

## 2022-07-18 PROCEDURE — 97110 THERAPEUTIC EXERCISES: CPT

## 2022-07-18 NOTE — FLOWSHEET NOTE
[] [facility]) - [facility] &  Therapy  [address]  [phone]  F: [phone] [] [address]  [place] [address]   [address]  P: [phone]  F: [phone] [] [place]  [facility]  [address]  P: [phone]  F: [phone] [x] [address]  P: [phone]  F: [phone] [] [address]  [facility]   [address]   [place]: [phone]  F: [phone]      Physical Therapy Daily Treatment Note    Date:  2022  Patient Name:  Martine Paulson    :  1970  MRN: [id number]  Physician: Dr. Ruma Johns: Lesley Adam (48 visits)  Medical Diagnosis: S/P L hip PATSY               Rehab Codes: M16.12  Onset date: 20               Next 's appt.: TBA  Visit# / total visits:       Cancels/No Shows:     Subjective:    Pain:  [x] Yes  [] No Location: L hip  Pain Rating: (0-10 scale) 0/10  Pain altered Tx:  [] No  [] Yes  Action:  Comments: Pt arrives without pain or soreness, states he rode his bike today.      Objective:  Modalities:   Precautions:Anterior approach SURGERY DATE 22  Exercises:  Exercise  L PATSY Reps/ Time Weight/ Level Comments Completed    NUSTEP 8' L5  Held  due to pt biking to PT --   Glute setting  2 x 10  3\"   -   SAQ 2x10  4 lbs   -   LAQ 2 x 10 5 lbs  x   Quad set 10x5\" hold     -   Heel slide 2 x 10   Active with a 3\" eccentric count  -   Supine hip abd with slide board 2 x 10     -   Sidelying hip abd 2x10 active  x   Clamshells 2x10 active  x   Bridges 2x10   x                    Standing     All in // bars    Marches 2x10 ea 3 lbs  x   Hip abduction 2x10 ea 3 lbs  x   Hip flexion 2x10 ea 3 lbs  x   Hip Extension  2x10 ea    x   HS curls 2x10 ea   3 lbs   x   TG suqats 2x10 18 LESS THAN 90 DEGREES HIP FLEX -   Leg press 2x10 20 lbs Fully extended as to not pass 90 degrees hip flexion -   Step ups fwd and lateral 2x15 8\"    x    Tandem balance  2x30\"   L foot forward, foam, Eyes closed -   SLS 2x30\" ea  With B UE assist x    Quad stretch 3x30\"   At stairs x   Squat tap chair 2x10  +AirEx x   Lateral walks 2 laps lime  x   Monster walks 2 laps lime  x   Heel taps 2x10 4\"  x   Calf raises  2x10   x   3 way reach  2x5 ea   Min UE assist x   Other:     Vasocompression: 15' moderate compression 34deg supine with bolster- held      Specific Instructions for next treatment: Progress to hip abd with tband (avoid excessive hip ext)      Treatment Charges: Mins Units   []  Modalities     [x]  Ther Exercise 45 3   []  Manual Therapy     []  Ther Activities     []  Aquatics     []  Vasocompression     []  Other: Gait      Total Treatment time 45 3       Assessment: [x] Progressing toward goals. Pt appears to be progressing appropriately. Progressed to 3 way reach to challenge balance with motion, fair tolerance. Appropriate muscle fatigue reported post interventions this date. Pt also denies pain throughout treatment. [] No change. [] Other:  [x] Patient would continue to benefit from skilled physical therapy services in order to: Increase LLE strength, promote an increase in ambulation progressing from RW to straight cane to no device as tolerated in order for patient to return to work. STG/LTG  STG: (to be met in 10 treatments)  ? Pain: Decrease pain levels to 4/10 at worst  ? ROM: Increase flexibility and AROM limitations throughout to equal bilat to reduce difficulty with ADLs, full L hip painfree ROM  ? Strength: Increase L hip flex strength to 4/5  ?  Function: Pt to ambulate with straight cane without any gait deficits  Independent with Home Exercise Programs         LTG: (to be met in 20 treatments)  Improve score on assessment tool from 89% impaired to 40% impaired, demonstrating an improvement in ADLs  Reduce pain levels to 0/10  Pt to have increased L hip flex strength to 5/5  Pt to ambulate without any device and without any gait deficits                     Patient goals: To return to work      Pt. Education:  [x] Yes  [] No  [] Reviewed Prior HEP/Ed  Method of Education: [x] Verbal  [x] Demo  [x] Written  Comprehension of Education:  [x] Verbalizes understanding. [x] Demonstrates understanding. [] Needs review. [] Demonstrates/verbalizes HEP/Ed previously given. Access Code: [email or web address]  URL: [email or web address]  Date: 39/39/0073  Prepared by: Gela Brady     Exercises  Supine Heel Slide - 1 x daily - 7 x weekly - 2 sets - 10 reps  Supine Knee Extension Strengthening - 1 x daily - 7 x weekly - 2 sets - 10 reps  Supine Quad Set on Towel Roll - 1 x daily - 7 x weekly - 2 sets - 10 reps  Heel rises with counter support - 1 x daily - 7 x weekly - 2 sets - 10 reps  Standing March with Counter Support - 1 x daily - 7 x weekly - 2 sets - 10 reps  Standing Knee Flexion - 1 x daily - 7 x weekly - 2 sets - 10 reps        Plan: [x] Continue current frequency toward long and short term goals.     [x] Specific Instructions for subsequent treatments: see above    Frequency:  3 x/week for 20 visits (3x/wk for 2-3 weeks, then decrease to 2x/wk)      Time In: 1:00pm           Time Out: 1:45pm    Electronically signed by:  Omid Bonilla PTA

## 2022-07-21 ENCOUNTER — HOSPITAL ENCOUNTER (OUTPATIENT)
Dept: PHYSICAL THERAPY | Facility: CLINIC | Age: 52
Setting detail: THERAPIES SERIES
Discharge: HOME | End: 2022-07-21
Payer: COMMERCIAL

## 2022-07-21 PROCEDURE — 97110 THERAPEUTIC EXERCISES: CPT

## 2022-07-21 NOTE — FLOWSHEET NOTE
[] [facility]) - [facility] CENTER &  Therapy  [address]  [phone]  F: [phone] [] [address]  [place] [address]   [address]  P: [phone]  F: [phone] [] [address]  [address]  P: [phone]  F: [phone] [x] [address]  P: [phone]  F: [phone] [] [address]  [facility]   [address]   [place]: [phone]  F: [phone]      Physical Therapy Daily Treatment Note    Date:  2022  Patient Name:  Tommie Arriaga    :  1970  MRN: [id number]  Physician: Dr. Candida Smith: Mendez Herrera (48 visits)  Medical Diagnosis: S/P L hip PATSY               Rehab Codes: M16.12  Onset date: 20               Next 's appt.: TBA  Visit# / total visits:       Cancels/No Shows:     Subjective:    Pain:  [x] Yes  [] No Location: L hip  Pain Rating: (0-10 scale) 0/10  Pain altered Tx:  [] No  [] Yes  Action:  Comments: Pt reported no pain, can have minor increases in certain positions but with normal daily activities he is not having any pain.      Objective:  Modalities:   Precautions:Anterior approach SURGERY DATE 22  Exercises:  Exercise  L PATSY Reps/ Time Weight/ Level Comments Completed    NUSTEP 8' L5  Held  due to pt biking to PT --   Glute setting  2 x 10  3\"   -   SAQ 2x10  4 lbs   -   LAQ 2 x 10 5 lbs  x   Quad set 10x5\" hold     -   Heel slide 2 x 10   Active with a 3\" eccentric count  -   Supine hip abd with slide board 2 x 10     -   Sidelying hip abd 2x10 active  x   Clamshells 2x10 active  x   Bridges 2x10   x                    Standing     All in // bars    Marches 2x10 ea 3 lbs  x   Hip abduction 2x10 ea 3 lbs  x   Hip flexion 2x10 ea 3 lbs  x   Hip Extension 2x10 ea    x   HS curls 2x10 ea   3 lbs   x   TG suqats 2x10  18 LESS THAN 90 DEGREES HIP FLEX -   Leg press 2x10 20 lbs Fully extended as to not pass 90 degrees hip flexion -   Step ups fwd and lateral 2x15 8\" 3 lbs   x    Tandem balance  2x30\"   L foot forward, foam, Eyes closed -   SLS 2x30\" ea  With B UE assist x    Quad stretch 3x30\"   At stairs x   Squat tap chair 2x10  +AirEx x   Lateral walks 2 laps lime  x   Monster walks 2 laps lime  x   Heel taps 2x10 4\"  x   Calf raises  2x10 3 lbs  x   3 way reach  2x5 ea   Min UE assist x   Other:     Vasocompression: 15' moderate compression 34deg supine with bolster- held      Specific Instructions for next treatment: Progress to hip abd with tband (avoid excessive hip ext)      Treatment Charges: Mins Units   []  Modalities     [x]  Ther Exercise 43 3   []  Manual Therapy     []  Ther Activities     []  Aquatics     []  Vasocompression     []  Other: Gait      Total Treatment time 43 3       Assessment: [x] Progressing toward goals. Pt with good tolerance to activities, progressed calf raises and step ups to include ankle weight to improve LE strength. Pt in need of minor visual and verbal cues for proper technique for squat taps and 3 way reach with good carry over. [] No change. [] Other:  [x] Patient would continue to benefit from skilled physical therapy services in order to: Increase LLE strength, promote an increase in ambulation progressing from RW to straight cane to no device as tolerated in order for patient to return to work. STG/LTG  STG: (to be met in 10 treatments)  ? Pain: Decrease pain levels to 4/10 at worst  ? ROM: Increase flexibility and AROM limitations throughout to equal bilat to reduce difficulty with ADLs, full L hip painfree ROM  ? Strength: Increase L hip flex strength to 4/5  ?  Function: Pt to ambulate with straight cane without any gait deficits  Independent with Home Exercise Programs         LTG: (to be met in 20 treatments)  Improve score on assessment tool from 89% impaired to 40% impaired, demonstrating an improvement in ADLs  Reduce pain levels to 0/10  Pt to have increased L hip flex strength to 5/5  Pt to ambulate without any device and without any gait deficits                     Patient goals: To return to work      Pt. Education:  [x] Yes  [] No  [] Reviewed Prior HEP/Ed  Method of Education: [x] Verbal  [x] Demo  [x] Written  Comprehension of Education:  [x] Verbalizes understanding. [x] Demonstrates understanding. [] Needs review. [] Demonstrates/verbalizes HEP/Ed previously given. Access Code: [email or web address]  URL: [email or web address]  Date: 61/95/6068  Prepared by: Luda Salgado     Exercises  Supine Heel Slide - 1 x daily - 7 x weekly - 2 sets - 10 reps  Supine Knee Extension Strengthening - 1 x daily - 7 x weekly - 2 sets - 10 reps  Supine Quad Set on Towel Roll - 1 x daily - 7 x weekly - 2 sets - 10 reps  Heel rises with counter support - 1 x daily - 7 x weekly - 2 sets - 10 reps  Standing March with Counter Support - 1 x daily - 7 x weekly - 2 sets - 10 reps  Standing Knee Flexion - 1 x daily - 7 x weekly - 2 sets - 10 reps        Plan: [x] Continue current frequency toward long and short term goals.     [x] Specific Instructions for subsequent treatments: see above    Frequency:  3 x/week for 20 visits (3x/wk for 2-3 weeks, then decrease to 2x/wk)      Time In: 1300           Time Out: 1343    Electronically signed by:  Zainab Connolly PTA

## 2022-07-25 ENCOUNTER — HOSPITAL ENCOUNTER (OUTPATIENT)
Dept: PHYSICAL THERAPY | Facility: CLINIC | Age: 52
Setting detail: THERAPIES SERIES
Discharge: HOME | End: 2022-07-25
Payer: COMMERCIAL

## 2022-07-25 NOTE — FLOWSHEET NOTE
[] [address]  [address]    [phone]  F: [phone]   [] [address]  [facility] [address]   [address]  P: [phone]  F: [phone]  [] [address]  [address] [facility]  P: [phone]  F: [phone] [x] [address] [employer]  P: [phone]  F: [phone]  [] [address]  [address] [facility] [address]   [address]   [place]: [phone]  F: [phone]   [] [address]  [place]: [phone]   F: [phone]     Physical Therapy Cancel/No Show note    Date: 2022  Patient: Noe Randle  : 1970  MRN: [id number]    Cancels/No Shows to date: 3/1    For today's appointment patient:    [x]  Cancelled    [] Rescheduled appointment    [] No-show     Reason given by patient:    []  Patient ill    []  Conflicting appointment    [] No transportation      [] Conflict with work    [] No reason given    [] Weather related    [] COVID-19    [x] Other:      Comments:  PT fell and broke his femur. He is currently in the hosp. Will call back to get on schedule later this week.       [] Next appointment was confirmed    Electronically signed by: Liz Jaramillo

## 2022-08-01 ENCOUNTER — HOSPITAL ENCOUNTER (OUTPATIENT)
Dept: PHYSICAL THERAPY | Facility: CLINIC | Age: 52
Setting detail: THERAPIES SERIES
Discharge: HOME | End: 2022-08-01

## 2022-08-01 NOTE — FLOWSHEET NOTE
[] [address]  [address]    [phone]  F: [phone]   [] [address]  [facility] 36   [address]  P: [phone]  F: [phone]  [] [address]  [address] [facility]  P: [phone]  F: [phone] [x] [address]  P: [phone]  F: [phone]  [] [address]  [address] [address] [facility] [address]   [address]   [place]: [phone]  F: [phone]   [] [address]  [place]: [phone]   F: [phone]     Physical Therapy Cancel/No Show note    Date: 2022  Patient: Stiven Garcia  : 1970  MRN: [id number]    Cancels/No Shows to date: 3/1    For today's appointment patient:    [x]  Cancelled    [] Rescheduled appointment    [] No-show     Reason given by patient:    []  Patient ill    []  Conflicting appointment    [] No transportation      [] Conflict with work    [] No reason given    [] Weather related    [] COVID-19    [x] Other:      Comments:  No reason given. Next appt conf.       [x] Next appointment was confirmed    Electronically signed by: Oumou Escobar

## 2022-08-04 RX ORDER — OXYCODONE HYDROCHLORIDE AND ACETAMINOPHEN 5; 325 MG/1; MG/1
TABLET ORAL
Qty: 56 TABLET | OUTPATIENT
Start: 2022-08-04

## 2022-12-06 NOTE — PROGRESS NOTES
Pt discharged to home in stable condition with belongings  Discharge instructions given  \"Meds To Beds\" medication at bedside  Pt denies having any further questions at this time  Personal items given to patient at discharge  Patient/family state they have everything they were admitted with.   Hibiclens sent home with patient Glycopyrrolate Counseling:  I discussed with the patient the risks of glycopyrrolate including but not limited to skin rash, drowsiness, dry mouth, difficulty urinating, and blurred vision.

## 2023-05-02 DIAGNOSIS — M16.11 ARTHRITIS OF RIGHT HIP: Primary | ICD-10-CM

## 2023-05-04 ENCOUNTER — OFFICE VISIT (OUTPATIENT)
Dept: ORTHOPEDIC SURGERY | Age: 53
End: 2023-05-04
Payer: COMMERCIAL

## 2023-05-04 VITALS — HEIGHT: 69 IN | BODY MASS INDEX: 24.88 KG/M2 | WEIGHT: 168 LBS | RESPIRATION RATE: 14 BRPM

## 2023-05-04 DIAGNOSIS — Z96.642 STATUS POST TOTAL HIP REPLACEMENT, LEFT: Primary | ICD-10-CM

## 2023-05-04 DIAGNOSIS — Z96.642 S/P TOTAL LEFT HIP ARTHROPLASTY: ICD-10-CM

## 2023-05-04 DIAGNOSIS — M16.12 ARTHRITIS OF LEFT HIP: ICD-10-CM

## 2023-05-04 PROCEDURE — 99214 OFFICE O/P EST MOD 30 MIN: CPT | Performed by: ORTHOPAEDIC SURGERY

## 2023-05-08 ASSESSMENT — ENCOUNTER SYMPTOMS
SHORTNESS OF BREATH: 0
ROS SKIN COMMENTS: NEGATIVE FOR RASH
ABDOMINAL PAIN: 0
EYE DISCHARGE: 0

## 2023-05-08 NOTE — PROGRESS NOTES
[facility]  [address] [facility]  [address] [address]  Dept: [phone]  Dept Fax: [phone]        Ambulatory Follow Up      Subjective:   Chayo Hannah is a 45y.o. year old male who presents to our office today for routine followup regarding his   1. Status post total hip replacement, left    2. Arthritis of left hip    3. S/P total left hip arthroplasty    . Chief Complaint   Patient presents with    Hip Pain     bilateral       HPI  Chayo Hannah is a 52-year-old male who presents the office today for recheck. In June of last year he underwent left total hip arthroplasty. Shortly thereafter he fell and was seen in [facility] where he had open reduction and internal fixation of a Lexa B fracture by Dr. Karrie Richard. He has done well after that. He continues to have more has developed progressively worsening right hip pain for which she is taking meloxicam.  He does do some bicycle exercises but is finding it more more difficult to be as active as he would like to because of the right hip. Review of Systems   Constitutional:  Positive for activity change. Negative for fever. HENT:  Negative for dental problem. Eyes:  Negative for discharge. Respiratory:  Negative for shortness of breath. Cardiovascular:  Negative for chest pain. Gastrointestinal:  Negative for abdominal pain. Genitourinary: Negative. Musculoskeletal:  Positive for arthralgias. Skin:         Negative for rash   Neurological:  Positive for weakness. Psychiatric/Behavioral:  Negative for confusion. I have reviewed the CC, HPI, ROS, PMH, FHX, Social History, and if not present in this note, I have reviewed in the patient's chart. I agree with the documentation provided by other staff and have reviewed their documentation prior to providing my signature indicating agreement.     Objective :

## 2023-06-01 ENCOUNTER — TELEPHONE (OUTPATIENT)
Dept: ORTHOPEDIC SURGERY | Age: 53
End: 2023-06-01

## 2023-06-01 NOTE — TELEPHONE ENCOUNTER
Patient called and is interested in getting scheduled for a R PATSY can I go ahead and fill surgery sheet out for that and get started on scheduling patient?

## 2023-06-02 ENCOUNTER — TELEPHONE (OUTPATIENT)
Dept: ORTHOPEDIC SURGERY | Age: 53
End: 2023-06-02

## 2023-06-02 DIAGNOSIS — Z01.818 PRE-OP TESTING: Primary | ICD-10-CM

## 2023-06-05 DIAGNOSIS — I10 ESSENTIAL HYPERTENSION: ICD-10-CM

## 2023-06-05 RX ORDER — DILTIAZEM HYDROCHLORIDE 240 MG/1
240 CAPSULE, EXTENDED RELEASE ORAL DAILY
Qty: 30 CAPSULE | Refills: 5 | Status: SHIPPED | OUTPATIENT
Start: 2023-06-05

## 2023-06-05 RX ORDER — TRIAMTERENE AND HYDROCHLOROTHIAZIDE 37.5; 25 MG/1; MG/1
1 TABLET ORAL DAILY
Qty: 30 TABLET | Refills: 5 | Status: SHIPPED | OUTPATIENT
Start: 2023-06-05

## 2023-06-05 RX ORDER — HYDRALAZINE HYDROCHLORIDE 25 MG/1
25 TABLET, FILM COATED ORAL 2 TIMES DAILY
Qty: 60 TABLET | Refills: 5 | Status: SHIPPED | OUTPATIENT
Start: 2023-06-05

## 2023-06-05 RX ORDER — HYDROXYZINE HYDROCHLORIDE 25 MG/1
TABLET, FILM COATED ORAL
Qty: 30 TABLET | Refills: 3 | Status: SHIPPED | OUTPATIENT
Start: 2023-06-05

## 2023-06-05 RX ORDER — METHOCARBAMOL 500 MG/1
500 TABLET, FILM COATED ORAL 3 TIMES DAILY PRN
Qty: 90 TABLET | Refills: 0 | Status: SHIPPED | OUTPATIENT
Start: 2023-06-05

## 2023-06-05 NOTE — TELEPHONE ENCOUNTER
Patient called in for refills. He also states that he was previously prescribed a medication to help him sleep and he would like that filled as well if possible. Please advise.

## 2023-06-27 ENCOUNTER — HOSPITAL ENCOUNTER (OUTPATIENT)
Dept: PREADMISSION TESTING | Age: 53
Discharge: HOME | End: 2023-07-01
Payer: COMMERCIAL

## 2023-06-27 ENCOUNTER — HOSPITAL ENCOUNTER (OUTPATIENT)
Dept: GENERAL RADIOLOGY | Age: 53
Discharge: HOME | End: 2023-06-29
Payer: COMMERCIAL

## 2023-06-27 VITALS
WEIGHT: 173.2 LBS | BODY MASS INDEX: 25.65 KG/M2 | HEIGHT: 69 IN | RESPIRATION RATE: 15 BRPM | OXYGEN SATURATION: 99 % | DIASTOLIC BLOOD PRESSURE: 74 MMHG | SYSTOLIC BLOOD PRESSURE: 141 MMHG | HEART RATE: 79 BPM | TEMPERATURE: 98 F

## 2023-06-27 DIAGNOSIS — Z01.818 PRE-OP TESTING: ICD-10-CM

## 2023-06-27 LAB
ABO + RH BLD: NORMAL
ALBUMIN SERPL-MCNC: 4.1 G/DL (ref 3.5–5.2)
ALP SERPL-CCNC: 82 U/L (ref 40–129)
ALT SERPL-CCNC: 40 U/L (ref 5–41)
ANION GAP SERPL CALCULATED.3IONS-SCNC: 12 MMOL/L (ref 9–17)
ARM BAND NUMBER: NORMAL
AST SERPL-CCNC: 31 U/L
BILIRUB SERPL-MCNC: 0.3 MG/DL (ref 0.3–1.2)
BILIRUB UR QL STRIP: NEGATIVE
BLOOD GROUP ANTIBODIES SERPL: NEGATIVE
BUN SERPL-MCNC: 18 MG/DL (ref 6–20)
BUN/CREAT SERPL: 24 (ref 9–20)
CALCIUM SERPL-MCNC: 9.1 MG/DL (ref 8.6–10.4)
CHLORIDE SERPL-SCNC: 101 MMOL/L (ref 98–107)
CLARITY UR: CLEAR
CO2 SERPL-SCNC: 24 MMOL/L (ref 20–31)
COLOR UR: YELLOW
COMMENT UA: NORMAL
CREAT SERPL-MCNC: 0.75 MG/DL (ref 0.7–1.2)
EKG ATRIAL RATE: 70 BPM
EKG P AXIS: 35 DEGREES
EKG P-R INTERVAL: 148 MS
EKG Q-T INTERVAL: 402 MS
EKG QRS DURATION: 106 MS
EKG QTC CALCULATION (BAZETT): 434 MS
EKG R AXIS: -40 DEGREES
EKG T AXIS: 6 DEGREES
EKG VENTRICULAR RATE: 70 BPM
ERYTHROCYTE [DISTWIDTH] IN BLOOD BY AUTOMATED COUNT: 12.3 % (ref 11.8–14.4)
EST. AVERAGE GLUCOSE BLD GHB EST-MCNC: 94 MG/DL
EXPIRATION DATE: NORMAL
GFR SERPL CREATININE-BSD FRML MDRD: >60 ML/MIN/1.73M2
GLUCOSE SERPL-MCNC: 118 MG/DL (ref 70–99)
GLUCOSE UR STRIP-MCNC: NEGATIVE MG/DL
HBA1C MFR BLD: 4.9 % (ref 4–6)
HCT VFR BLD AUTO: 37.9 % (ref 40.7–50.3)
HGB BLD-MCNC: 12.6 G/DL (ref 13–17)
HGB UR QL STRIP.AUTO: NEGATIVE
KETONES UR STRIP-MCNC: NEGATIVE MG/DL
LEUKOCYTE ESTERASE UR QL STRIP: NEGATIVE
MCH RBC QN AUTO: 33 PG (ref 25.2–33.5)
MCHC RBC AUTO-ENTMCNC: 33.2 G/DL (ref 28.4–34.8)
MCV RBC AUTO: 99.2 FL (ref 82.6–102.9)
NITRITE UR QL STRIP: NEGATIVE
NRBC BLD-RTO: 0 PER 100 WBC
PH UR STRIP: 7 [PH] (ref 5–8)
PLATELET # BLD AUTO: 193 K/UL (ref 138–453)
PMV BLD AUTO: 9.4 FL (ref 8.1–13.5)
POTASSIUM SERPL-SCNC: 4.5 MMOL/L (ref 3.7–5.3)
PROT SERPL-MCNC: 6.7 G/DL (ref 6.4–8.3)
PROT UR STRIP-MCNC: NEGATIVE MG/DL
RBC # BLD AUTO: 3.82 M/UL (ref 4.21–5.77)
SODIUM SERPL-SCNC: 137 MMOL/L (ref 135–144)
SP GR UR STRIP: 1.01 (ref 1–1.03)
UROBILINOGEN UR STRIP-ACNC: NORMAL
WBC OTHER # BLD: 5.7 K/UL (ref 3.5–11.3)

## 2023-06-27 PROCEDURE — 81003 URINALYSIS AUTO W/O SCOPE: CPT

## 2023-06-27 PROCEDURE — 87641 MR-STAPH DNA AMP PROBE: CPT

## 2023-06-27 PROCEDURE — 93005 ELECTROCARDIOGRAM TRACING: CPT

## 2023-06-27 PROCEDURE — 36415 COLL VENOUS BLD VENIPUNCTURE: CPT

## 2023-06-27 PROCEDURE — 80053 COMPREHEN METABOLIC PANEL: CPT

## 2023-06-27 PROCEDURE — 71046 X-RAY EXAM CHEST 2 VIEWS: CPT

## 2023-06-27 PROCEDURE — 83036 HEMOGLOBIN GLYCOSYLATED A1C: CPT

## 2023-06-27 PROCEDURE — 86900 BLOOD TYPING SEROLOGIC ABO: CPT

## 2023-06-27 PROCEDURE — 85027 COMPLETE CBC AUTOMATED: CPT

## 2023-06-27 PROCEDURE — 86901 BLOOD TYPING SEROLOGIC RH(D): CPT

## 2023-06-27 PROCEDURE — 86850 RBC ANTIBODY SCREEN: CPT

## 2023-06-27 RX ORDER — ACETAMINOPHEN 500 MG
1000 TABLET ORAL ONCE
OUTPATIENT
Start: 2023-07-18

## 2023-06-27 RX ORDER — GABAPENTIN 300 MG/1
300 CAPSULE ORAL ONCE
OUTPATIENT
Start: 2023-07-18

## 2023-06-27 RX ORDER — CELECOXIB 200 MG/1
200 CAPSULE ORAL ONCE
OUTPATIENT
Start: 2023-07-18

## 2023-06-27 ASSESSMENT — PROMIS GLOBAL HEALTH SCALE
IN THE PAST 7 DAYS, HOW WOULD YOU RATE YOUR PAIN ON AVERAGE [ON A SCALE FROM 0 (NO PAIN) TO 10 (WORST IMAGINABLE PAIN)]?: 5
IN THE PAST 7 DAYS, HOW OFTEN HAVE YOU BEEN BOTHERED BY EMOTIONAL PROBLEMS, SUCH AS FEELING ANXIOUS, DEPRESSED, OR IRRITABLE [ON A SCALE FROM 1 (NEVER) TO 5 (ALWAYS)]?: 3
IN GENERAL, WOULD YOU SAY YOUR QUALITY OF LIFE IS...[ON A SCALE OF 1 (POOR) TO 5 (EXCELLENT)]: 3
WHO IS THE PERSON COMPLETING THE PROMIS V1.1 SURVEY?: 0
TO WHAT EXTENT ARE YOU ABLE TO CARRY OUT YOUR EVERYDAY PHYSICAL ACTIVITIES SUCH AS WALKING, CLIMBING STAIRS, CARRYING GROCERIES, OR MOVING A CHAIR [ON A SCALE OF 1 (NOT AT ALL) TO 5 (COMPLETELY)]?: 2
SUM OF RESPONSES TO QUESTIONS 2, 4, 5, & 10: 15
IN GENERAL, PLEASE RATE HOW WELL YOU CARRY OUT YOUR USUAL SOCIAL ACTIVITIES (INCLUDES ACTIVITIES AT HOME, AT WORK, AND IN YOUR COMMUNITY, AND RESPONSIBILITIES AS A PARENT, CHILD, SPOUSE, EMPLOYEE, FRIEND, ETC) [ON A SCALE OF 1 (POOR) TO 5 (EXCELLENT)]?: 2
IN GENERAL, HOW WOULD YOU RATE YOUR PHYSICAL HEALTH [ON A SCALE OF 1 (POOR) TO 5 (EXCELLENT)]?: 4
SUM OF RESPONSES TO QUESTIONS 3, 6, 7, & 8: 14
IN GENERAL, WOULD YOU SAY YOUR HEALTH IS...[ON A SCALE OF 1 (POOR) TO 5 (EXCELLENT)]: 4
IN GENERAL, HOW WOULD YOU RATE YOUR MENTAL HEALTH, INCLUDING YOUR MOOD AND YOUR ABILITY TO THINK [ON A SCALE OF 1 (POOR) TO 5 (EXCELLENT)]?: 5
HOW IS THE PROMIS V1.1 BEING ADMINISTERED?: 0
IN GENERAL, HOW WOULD YOU RATE YOUR SATISFACTION WITH YOUR SOCIAL ACTIVITIES AND RELATIONSHIPS [ON A SCALE OF 1 (POOR) TO 5 (EXCELLENT)]?: 4
IN THE PAST 7 DAYS, HOW WOULD YOU RATE YOUR FATIGUE ON AVERAGE [ON A SCALE FROM 1 (NONE) TO 5 (VERY SEVERE)]?: 3

## 2023-06-27 ASSESSMENT — HOOS JR
WALKING ON UNEVEN SURFACE: 3
HOOS JR RAW SCORE: 15
SITTING: 2
GOING UP OR DOWN STAIRS: 3
HOOS JR TOTAL INTERVAL SCORE: 43.335
LYING IN BED (TURNING OVER, MAINTAINING HIP POSITION): 2
HOOS JR RAW SCORE: 15
RISING FROM SITTING: 2
BENDING TO THE FLOOR TO PICK UP OBJECT: 3

## 2023-06-27 ASSESSMENT — PAIN SCALES - GENERAL: PAINLEVEL_OUTOF10: 6

## 2023-06-27 ASSESSMENT — PAIN DESCRIPTION - FREQUENCY: FREQUENCY: INTERMITTENT

## 2023-06-27 ASSESSMENT — PAIN DESCRIPTION - PAIN TYPE: TYPE: CHRONIC PAIN

## 2023-06-27 ASSESSMENT — PAIN DESCRIPTION - DESCRIPTORS: DESCRIPTORS: DULL;ACHING

## 2023-06-27 ASSESSMENT — PAIN DESCRIPTION - ORIENTATION: ORIENTATION: RIGHT

## 2023-06-27 ASSESSMENT — PAIN DESCRIPTION - LOCATION: LOCATION: HIP

## 2023-06-28 LAB
MRSA, DNA, NASAL: NEGATIVE
SPECIMEN DESCRIPTION: NORMAL

## 2023-07-05 ENCOUNTER — OFFICE VISIT (OUTPATIENT)
Dept: FAMILY MEDICINE CLINIC | Age: 53
End: 2023-07-05
Payer: COMMERCIAL

## 2023-07-05 VITALS
DIASTOLIC BLOOD PRESSURE: 96 MMHG | HEART RATE: 80 BPM | WEIGHT: 163.8 LBS | SYSTOLIC BLOOD PRESSURE: 132 MMHG | BODY MASS INDEX: 24.26 KG/M2 | HEIGHT: 69 IN | TEMPERATURE: 98 F | OXYGEN SATURATION: 97 %

## 2023-07-05 DIAGNOSIS — Z12.11 SCREEN FOR COLON CANCER: ICD-10-CM

## 2023-07-05 DIAGNOSIS — Z01.818 PRE-OPERATIVE CLEARANCE: Primary | ICD-10-CM

## 2023-07-05 DIAGNOSIS — D64.9 ANEMIA, UNSPECIFIED TYPE: ICD-10-CM

## 2023-07-05 PROCEDURE — 99214 OFFICE O/P EST MOD 30 MIN: CPT | Performed by: NURSE PRACTITIONER

## 2023-07-05 SDOH — ECONOMIC STABILITY: FOOD INSECURITY: WITHIN THE PAST 12 MONTHS, THE FOOD YOU BOUGHT JUST DIDN'T LAST AND YOU DIDN'T HAVE MONEY TO GET MORE.: NEVER TRUE

## 2023-07-05 SDOH — ECONOMIC STABILITY: HOUSING INSECURITY
IN THE LAST 12 MONTHS, WAS THERE A TIME WHEN YOU DID NOT HAVE A STEADY PLACE TO SLEEP OR SLEPT IN A SHELTER (INCLUDING NOW)?: NO

## 2023-07-05 SDOH — ECONOMIC STABILITY: INCOME INSECURITY: HOW HARD IS IT FOR YOU TO PAY FOR THE VERY BASICS LIKE FOOD, HOUSING, MEDICAL CARE, AND HEATING?: NOT HARD AT ALL

## 2023-07-05 SDOH — ECONOMIC STABILITY: FOOD INSECURITY: WITHIN THE PAST 12 MONTHS, YOU WORRIED THAT YOUR FOOD WOULD RUN OUT BEFORE YOU GOT MONEY TO BUY MORE.: NEVER TRUE

## 2023-07-05 ASSESSMENT — ENCOUNTER SYMPTOMS
SORE THROAT: 0
TROUBLE SWALLOWING: 0
VOMITING: 0
RHINORRHEA: 0
ABDOMINAL PAIN: 0
COUGH: 0
CONSTIPATION: 0
NAUSEA: 0
SHORTNESS OF BREATH: 0
DIARRHEA: 0
BLOOD IN STOOL: 0
CHEST TIGHTNESS: 0
SINUS PAIN: 0

## 2023-07-05 ASSESSMENT — PATIENT HEALTH QUESTIONNAIRE - PHQ9
SUM OF ALL RESPONSES TO PHQ QUESTIONS 1-9: 0
1. LITTLE INTEREST OR PLEASURE IN DOING THINGS: 0
2. FEELING DOWN, DEPRESSED OR HOPELESS: 0
SUM OF ALL RESPONSES TO PHQ9 QUESTIONS 1 & 2: 0
SUM OF ALL RESPONSES TO PHQ QUESTIONS 1-9: 0

## 2023-07-05 NOTE — PROGRESS NOTES
Visit Information    Have you changed or started any medications since your last visit including any over-the-counter medicines, vitamins, or herbal medicines? no   Have you stopped taking any of your medications? Is so, why? -  no  Are you having any side effects from any of your medications? - no    Have you seen any other physician or provider since your last visit?  no   Have you had any other diagnostic tests since your last visit?  no   Have you been seen in the emergency room and/or had an admission in a hospital since we last saw you?  no   Have you had your routine dental cleaning in the past 6 months?  no     Do you have an active MyChart account? If no, what is the barrier?   Yes    Patient Care Team:  ESTELA Ibrahim CNP as PCP - General (Family Nurse Practitioner)  ESTELA Ibrahim CNP as PCP - Empaneled Provider    Medical History Review  Past Medical, Family, and Social History reviewed and  contribute to the patient presenting condition    Health Maintenance   Topic Date Due    Depression Screen  Never done    Shingles vaccine (1 of 2) Never done    COVID-19 Vaccine (4 - Booster for [place] series) 02/05/2022    Colorectal Cancer Screen  02/25/2023    Flu vaccine (1) 08/01/2023    Lipids  02/03/2026    Diabetes screen  06/27/2026    DTaP/Tdap/Td vaccine (2 - Td or Tdap) 01/27/2031    Hepatitis C screen  Completed    HIV screen  Completed    Hepatitis A vaccine  Aged Out    Hib vaccine  Aged Out    Meningococcal (ACWY) vaccine  Aged Out    Pneumococcal 0-64 years Vaccine  Aged Out
tablet Take 1 tablet by mouth daily 30 tablet 2    dilTIAZem (DILACOR XR) 240 MG extended release capsule Take 1 capsule by mouth daily 30 capsule 5    hydrALAZINE (APRESOLINE) 25 MG tablet Take 1 tablet by mouth 2 times daily 60 tablet 5    triamterene-hydroCHLOROthiazide (MAXZIDE-25) 37.5-25 MG per tablet Take 1 tablet by mouth daily 30 tablet 5    methocarbamol (ROBAXIN) 500 MG tablet Take 1 tablet by mouth 3 times daily as needed (for teeth grinding) 90 tablet 0    hydrOXYzine HCl (ATARAX) 25 MG tablet Take 1-2 tabs po QHS PRN for sleep 30 tablet 3     No current facility-administered medications for this visit. No Known Allergies    Health Maintenance   Topic Date Due    Depression Screen  Never done    Shingles vaccine (1 of 2) Never done    COVID-19 Vaccine (4 - Booster for [employer] series) 02/05/2022    Colorectal Cancer Screen  02/25/2023    Flu vaccine (1) 08/01/2023    Lipids  02/03/2026    DTaP/Tdap/Td vaccine (2 - Td or Tdap) 01/27/2031    Hepatitis C screen  Completed    HIV screen  Completed    Hepatitis A vaccine  Aged Out    Hib vaccine  Aged Out    Meningococcal (ACWY) vaccine  Aged Out    Pneumococcal 0-64 years Vaccine  Aged Out    Diabetes screen  Discontinued       Subjective:      Review of Systems   Constitutional:  Negative for appetite change, chills, diaphoresis, fatigue and fever. HENT:  Negative for congestion, ear discharge, ear pain, nosebleeds, postnasal drip, rhinorrhea, sinus pain, sore throat and trouble swallowing. Eyes:  Negative for visual disturbance. Respiratory:  Negative for cough, chest tightness and shortness of breath. Cardiovascular:  Negative for chest pain, palpitations and leg swelling. Gastrointestinal:  Negative for abdominal pain, blood in stool, constipation, diarrhea, nausea and vomiting. Endocrine: Negative for polydipsia, polyphagia and polyuria. Genitourinary:  Negative for difficulty urinating, dysuria and hematuria.    Musculoskeletal:

## 2023-07-11 DIAGNOSIS — Z98.890 STATUS POST SURGERY: Primary | ICD-10-CM

## 2023-07-11 DIAGNOSIS — M16.11 ARTHRITIS OF RIGHT HIP: ICD-10-CM

## 2023-07-17 ENCOUNTER — TELEPHONE (OUTPATIENT)
Dept: ORTHOPEDIC SURGERY | Age: 53
End: 2023-07-17

## 2023-07-17 RX ORDER — METHOCARBAMOL 500 MG/1
TABLET, FILM COATED ORAL
Qty: 90 TABLET | Refills: 0 | Status: SHIPPED | OUTPATIENT
Start: 2023-07-17

## 2023-07-17 NOTE — TELEPHONE ENCOUNTER
Patient dropped off \"[employer] Form 1 - Family and Medical Leave Act\" paper for /Clinical staff to fill out appropriately. Paper was scanned into medica, and the original copy is also in Dr. Yifan Gonsalves folder at the  in [place] for his clinical staff to grab and complete. Pt would like a call from clinical once completed.

## 2023-07-18 ENCOUNTER — ANESTHESIA (OUTPATIENT)
Dept: OPERATING ROOM | Age: 53
End: 2023-07-18
Payer: COMMERCIAL

## 2023-07-18 ENCOUNTER — APPOINTMENT (OUTPATIENT)
Dept: GENERAL RADIOLOGY | Age: 53
End: 2023-07-18
Attending: ORTHOPAEDIC SURGERY
Payer: COMMERCIAL

## 2023-07-18 ENCOUNTER — ANESTHESIA EVENT (OUTPATIENT)
Dept: OPERATING ROOM | Age: 53
End: 2023-07-18
Payer: COMMERCIAL

## 2023-07-18 ENCOUNTER — HOSPITAL ENCOUNTER (OUTPATIENT)
Age: 53
Discharge: HOME | End: 2023-07-19
Attending: ORTHOPAEDIC SURGERY | Admitting: ORTHOPAEDIC SURGERY
Payer: COMMERCIAL

## 2023-07-18 DIAGNOSIS — G89.18 POST-OP PAIN: Primary | ICD-10-CM

## 2023-07-18 PROBLEM — Z96.641 S/P TOTAL RIGHT HIP ARTHROPLASTY: Status: ACTIVE | Noted: 2023-07-18

## 2023-07-18 PROCEDURE — 6360000002 HC RX W HCPCS: Performed by: ANESTHESIOLOGY

## 2023-07-18 PROCEDURE — C1776 JOINT DEVICE (IMPLANTABLE): HCPCS | Performed by: ORTHOPAEDIC SURGERY

## 2023-07-18 PROCEDURE — 3600000015 HC SURGERY LEVEL 5 ADDTL 15MIN: Performed by: ORTHOPAEDIC SURGERY

## 2023-07-18 PROCEDURE — 3700000001 HC ADD 15 MINUTES (ANESTHESIA): Performed by: ORTHOPAEDIC SURGERY

## 2023-07-18 PROCEDURE — 97535 SELF CARE MNGMENT TRAINING: CPT

## 2023-07-18 PROCEDURE — 97110 THERAPEUTIC EXERCISES: CPT

## 2023-07-18 PROCEDURE — 6360000002 HC RX W HCPCS: Performed by: NURSE ANESTHETIST, CERTIFIED REGISTERED

## 2023-07-18 PROCEDURE — 6360000002 HC RX W HCPCS: Performed by: ORTHOPAEDIC SURGERY

## 2023-07-18 PROCEDURE — 3600000005 HC SURGERY LEVEL 5 BASE: Performed by: ORTHOPAEDIC SURGERY

## 2023-07-18 PROCEDURE — 6370000000 HC RX 637 (ALT 250 FOR IP)

## 2023-07-18 PROCEDURE — 7100000000 HC PACU RECOVERY - FIRST 15 MIN: Performed by: ORTHOPAEDIC SURGERY

## 2023-07-18 PROCEDURE — 2709999900 HC NON-CHARGEABLE SUPPLY: Performed by: ORTHOPAEDIC SURGERY

## 2023-07-18 PROCEDURE — 2720000010 HC SURG SUPPLY STERILE: Performed by: ORTHOPAEDIC SURGERY

## 2023-07-18 PROCEDURE — 3700000000 HC ANESTHESIA ATTENDED CARE: Performed by: ORTHOPAEDIC SURGERY

## 2023-07-18 PROCEDURE — 6360000002 HC RX W HCPCS

## 2023-07-18 PROCEDURE — 2580000003 HC RX 258: Performed by: ANESTHESIOLOGY

## 2023-07-18 PROCEDURE — 7100000001 HC PACU RECOVERY - ADDTL 15 MIN: Performed by: ORTHOPAEDIC SURGERY

## 2023-07-18 PROCEDURE — 6370000000 HC RX 637 (ALT 250 FOR IP): Performed by: ANESTHESIOLOGY

## 2023-07-18 PROCEDURE — 97166 OT EVAL MOD COMPLEX 45 MIN: CPT

## 2023-07-18 PROCEDURE — 73502 X-RAY EXAM HIP UNI 2-3 VIEWS: CPT

## 2023-07-18 PROCEDURE — 2580000003 HC RX 258

## 2023-07-18 PROCEDURE — 6370000000 HC RX 637 (ALT 250 FOR IP): Performed by: ORTHOPAEDIC SURGERY

## 2023-07-18 PROCEDURE — 97530 THERAPEUTIC ACTIVITIES: CPT

## 2023-07-18 PROCEDURE — 2500000003 HC RX 250 WO HCPCS: Performed by: NURSE ANESTHETIST, CERTIFIED REGISTERED

## 2023-07-18 PROCEDURE — 97162 PT EVAL MOD COMPLEX 30 MIN: CPT

## 2023-07-18 PROCEDURE — 2580000003 HC RX 258: Performed by: ORTHOPAEDIC SURGERY

## 2023-07-18 DEVICE — HC PE LINER 28 / DMG
Type: IMPLANTABLE DEVICE | Site: HIP | Status: FUNCTIONAL
Brand: DOUBLE MOBILITY LINER

## 2023-07-18 DEVICE — DOUBLE MOBILITY ACETABULAR SHELL Ø54
Type: IMPLANTABLE DEVICE | Site: HIP | Status: FUNCTIONAL
Brand: MPACT DOUBLE MOBILITY SHELLS

## 2023-07-18 DEVICE — CEMENTLESS TI COATED LAT PLUS STEM # 10
Type: IMPLANTABLE DEVICE | Site: HIP | Status: FUNCTIONAL
Brand: MASTERLOC LATPLUS

## 2023-07-18 RX ORDER — OXYCODONE HYDROCHLORIDE AND ACETAMINOPHEN 5; 325 MG/1; MG/1
1-2 TABLET ORAL EVERY 6 HOURS PRN
Qty: 56 TABLET | Refills: 0 | Status: SHIPPED | OUTPATIENT
Start: 2023-07-18 | End: 2023-07-25

## 2023-07-18 RX ORDER — TRIAMTERENE AND HYDROCHLOROTHIAZIDE 37.5; 25 MG/1; MG/1
1 TABLET ORAL DAILY
Status: DISCONTINUED | OUTPATIENT
Start: 2023-07-18 | End: 2023-07-19 | Stop reason: HOSPADM

## 2023-07-18 RX ORDER — KETAMINE HCL IN NACL, ISO-OSM 100MG/10ML
SYRINGE (ML) INJECTION PRN
Status: DISCONTINUED | OUTPATIENT
Start: 2023-07-18 | End: 2023-07-18 | Stop reason: SDUPTHER

## 2023-07-18 RX ORDER — LIDOCAINE HYDROCHLORIDE 10 MG/ML
1 INJECTION, SOLUTION EPIDURAL; INFILTRATION; INTRACAUDAL; PERINEURAL
Status: DISCONTINUED | OUTPATIENT
Start: 2023-07-18 | End: 2023-07-18 | Stop reason: HOSPADM

## 2023-07-18 RX ORDER — SODIUM CHLORIDE 0.9 % (FLUSH) 0.9 %
5-40 SYRINGE (ML) INJECTION PRN
Status: DISCONTINUED | OUTPATIENT
Start: 2023-07-18 | End: 2023-07-18 | Stop reason: HOSPADM

## 2023-07-18 RX ORDER — SODIUM CHLORIDE 9 MG/ML
INJECTION, SOLUTION INTRAVENOUS PRN
Status: DISCONTINUED | OUTPATIENT
Start: 2023-07-18 | End: 2023-07-18 | Stop reason: HOSPADM

## 2023-07-18 RX ORDER — ASPIRIN 81 MG/1
81 TABLET ORAL 2 TIMES DAILY
Qty: 84 TABLET | Refills: 0 | Status: SHIPPED | OUTPATIENT
Start: 2023-07-18 | End: 2023-08-29

## 2023-07-18 RX ORDER — ONDANSETRON 2 MG/ML
4 INJECTION INTRAMUSCULAR; INTRAVENOUS
Status: DISCONTINUED | OUTPATIENT
Start: 2023-07-18 | End: 2023-07-18 | Stop reason: HOSPADM

## 2023-07-18 RX ORDER — DILTIAZEM HYDROCHLORIDE 240 MG/1
240 CAPSULE, COATED, EXTENDED RELEASE ORAL DAILY
Status: DISCONTINUED | OUTPATIENT
Start: 2023-07-18 | End: 2023-07-19 | Stop reason: HOSPADM

## 2023-07-18 RX ORDER — SODIUM CHLORIDE 9 MG/ML
INJECTION, SOLUTION INTRAVENOUS PRN
Status: DISCONTINUED | OUTPATIENT
Start: 2023-07-18 | End: 2023-07-19 | Stop reason: HOSPADM

## 2023-07-18 RX ORDER — HYDRALAZINE HYDROCHLORIDE 25 MG/1
25 TABLET, FILM COATED ORAL 2 TIMES DAILY
Status: DISCONTINUED | OUTPATIENT
Start: 2023-07-18 | End: 2023-07-19 | Stop reason: HOSPADM

## 2023-07-18 RX ORDER — ACETAMINOPHEN 500 MG
1000 TABLET ORAL ONCE
Status: COMPLETED | OUTPATIENT
Start: 2023-07-18 | End: 2023-07-18

## 2023-07-18 RX ORDER — GABAPENTIN 300 MG/1
300 CAPSULE ORAL ONCE
Status: DISCONTINUED | OUTPATIENT
Start: 2023-07-18 | End: 2023-07-18 | Stop reason: HOSPADM

## 2023-07-18 RX ORDER — SODIUM CHLORIDE 0.9 % (FLUSH) 0.9 %
5-40 SYRINGE (ML) INJECTION EVERY 12 HOURS SCHEDULED
Status: DISCONTINUED | OUTPATIENT
Start: 2023-07-18 | End: 2023-07-19 | Stop reason: HOSPADM

## 2023-07-18 RX ORDER — DIPHENHYDRAMINE HYDROCHLORIDE 50 MG/ML
12.5 INJECTION INTRAMUSCULAR; INTRAVENOUS
Status: DISCONTINUED | OUTPATIENT
Start: 2023-07-18 | End: 2023-07-18 | Stop reason: HOSPADM

## 2023-07-18 RX ORDER — MIDAZOLAM HYDROCHLORIDE 1 MG/ML
INJECTION INTRAMUSCULAR; INTRAVENOUS PRN
Status: DISCONTINUED | OUTPATIENT
Start: 2023-07-18 | End: 2023-07-18 | Stop reason: SDUPTHER

## 2023-07-18 RX ORDER — VANCOMYCIN HYDROCHLORIDE 1 G/20ML
INJECTION, POWDER, LYOPHILIZED, FOR SOLUTION INTRAVENOUS
Status: DISPENSED
Start: 2023-07-18 | End: 2023-07-18

## 2023-07-18 RX ORDER — OXYCODONE HYDROCHLORIDE 5 MG/1
10 TABLET ORAL EVERY 4 HOURS PRN
Status: DISCONTINUED | OUTPATIENT
Start: 2023-07-18 | End: 2023-07-19 | Stop reason: HOSPADM

## 2023-07-18 RX ORDER — FENTANYL CITRATE 50 UG/ML
50 INJECTION, SOLUTION INTRAMUSCULAR; INTRAVENOUS EVERY 5 MIN PRN
Status: DISCONTINUED | OUTPATIENT
Start: 2023-07-18 | End: 2023-07-18 | Stop reason: HOSPADM

## 2023-07-18 RX ORDER — BUPIVACAINE HYDROCHLORIDE 7.5 MG/ML
INJECTION, SOLUTION INTRASPINAL
Status: COMPLETED | OUTPATIENT
Start: 2023-07-18 | End: 2023-07-18

## 2023-07-18 RX ORDER — CELECOXIB 200 MG/1
200 CAPSULE ORAL ONCE
Status: DISCONTINUED | OUTPATIENT
Start: 2023-07-18 | End: 2023-07-18 | Stop reason: HOSPADM

## 2023-07-18 RX ORDER — LIDOCAINE HYDROCHLORIDE 20 MG/ML
INJECTION, SOLUTION EPIDURAL; INFILTRATION; INTRACAUDAL; PERINEURAL PRN
Status: DISCONTINUED | OUTPATIENT
Start: 2023-07-18 | End: 2023-07-18 | Stop reason: SDUPTHER

## 2023-07-18 RX ORDER — SODIUM CHLORIDE 9 MG/ML
INJECTION, SOLUTION INTRAVENOUS CONTINUOUS
Status: DISCONTINUED | OUTPATIENT
Start: 2023-07-18 | End: 2023-07-18

## 2023-07-18 RX ORDER — PHENYLEPHRINE HCL IN 0.9% NACL 1 MG/10 ML
SYRINGE (ML) INTRAVENOUS PRN
Status: DISCONTINUED | OUTPATIENT
Start: 2023-07-18 | End: 2023-07-18 | Stop reason: SDUPTHER

## 2023-07-18 RX ORDER — DOCUSATE SODIUM 100 MG/1
100 CAPSULE, LIQUID FILLED ORAL 2 TIMES DAILY PRN
Qty: 20 CAPSULE | Refills: 0 | Status: SHIPPED | OUTPATIENT
Start: 2023-07-18

## 2023-07-18 RX ORDER — OXYCODONE HYDROCHLORIDE 5 MG/1
5 TABLET ORAL EVERY 4 HOURS PRN
Status: DISCONTINUED | OUTPATIENT
Start: 2023-07-18 | End: 2023-07-19 | Stop reason: HOSPADM

## 2023-07-18 RX ORDER — PROPOFOL 10 MG/ML
INJECTION, EMULSION INTRAVENOUS CONTINUOUS PRN
Status: DISCONTINUED | OUTPATIENT
Start: 2023-07-18 | End: 2023-07-18 | Stop reason: SDUPTHER

## 2023-07-18 RX ORDER — SODIUM CHLORIDE 0.9 % (FLUSH) 0.9 %
5-40 SYRINGE (ML) INJECTION EVERY 12 HOURS SCHEDULED
Status: DISCONTINUED | OUTPATIENT
Start: 2023-07-18 | End: 2023-07-18 | Stop reason: HOSPADM

## 2023-07-18 RX ORDER — FENTANYL CITRATE 50 UG/ML
25 INJECTION, SOLUTION INTRAMUSCULAR; INTRAVENOUS EVERY 5 MIN PRN
Status: DISCONTINUED | OUTPATIENT
Start: 2023-07-18 | End: 2023-07-18 | Stop reason: HOSPADM

## 2023-07-18 RX ORDER — BISACODYL 5 MG/1
5 TABLET, DELAYED RELEASE ORAL DAILY
Status: DISCONTINUED | OUTPATIENT
Start: 2023-07-18 | End: 2023-07-19 | Stop reason: HOSPADM

## 2023-07-18 RX ORDER — POLYETHYLENE GLYCOL 3350 17 G/17G
17 POWDER, FOR SOLUTION ORAL DAILY
Status: DISCONTINUED | OUTPATIENT
Start: 2023-07-18 | End: 2023-07-19 | Stop reason: HOSPADM

## 2023-07-18 RX ORDER — MIDAZOLAM HYDROCHLORIDE 1 MG/ML
2 INJECTION INTRAMUSCULAR; INTRAVENOUS ONCE
Status: COMPLETED | OUTPATIENT
Start: 2023-07-18 | End: 2023-07-18

## 2023-07-18 RX ORDER — VANCOMYCIN HYDROCHLORIDE 1 G/20ML
INJECTION, POWDER, LYOPHILIZED, FOR SOLUTION INTRAVENOUS PRN
Status: DISCONTINUED | OUTPATIENT
Start: 2023-07-18 | End: 2023-07-18 | Stop reason: ALTCHOICE

## 2023-07-18 RX ORDER — ASPIRIN 81 MG/1
81 TABLET, CHEWABLE ORAL 2 TIMES DAILY
Status: DISCONTINUED | OUTPATIENT
Start: 2023-07-19 | End: 2023-07-19 | Stop reason: HOSPADM

## 2023-07-18 RX ORDER — ACETAMINOPHEN 325 MG/1
650 TABLET ORAL EVERY 6 HOURS
Status: DISCONTINUED | OUTPATIENT
Start: 2023-07-18 | End: 2023-07-19 | Stop reason: HOSPADM

## 2023-07-18 RX ORDER — SODIUM CHLORIDE, SODIUM LACTATE, POTASSIUM CHLORIDE, CALCIUM CHLORIDE 600; 310; 30; 20 MG/100ML; MG/100ML; MG/100ML; MG/100ML
INJECTION, SOLUTION INTRAVENOUS CONTINUOUS
Status: DISCONTINUED | OUTPATIENT
Start: 2023-07-18 | End: 2023-07-18

## 2023-07-18 RX ORDER — TRANEXAMIC ACID 100 MG/ML
INJECTION, SOLUTION INTRAVENOUS PRN
Status: DISCONTINUED | OUTPATIENT
Start: 2023-07-18 | End: 2023-07-18 | Stop reason: SDUPTHER

## 2023-07-18 RX ORDER — SODIUM CHLORIDE 0.9 % (FLUSH) 0.9 %
5-40 SYRINGE (ML) INJECTION PRN
Status: DISCONTINUED | OUTPATIENT
Start: 2023-07-18 | End: 2023-07-19 | Stop reason: HOSPADM

## 2023-07-18 RX ORDER — MAGNESIUM HYDROXIDE 1200 MG/15ML
LIQUID ORAL CONTINUOUS PRN
Status: COMPLETED | OUTPATIENT
Start: 2023-07-18 | End: 2023-07-18

## 2023-07-18 RX ORDER — TRANEXAMIC ACID 100 MG/ML
INJECTION, SOLUTION INTRAVENOUS
Status: COMPLETED
Start: 2023-07-18 | End: 2023-07-18

## 2023-07-18 RX ORDER — OXYCODONE HYDROCHLORIDE 5 MG/1
5 TABLET ORAL
Status: COMPLETED | OUTPATIENT
Start: 2023-07-18 | End: 2023-07-18

## 2023-07-18 RX ORDER — SODIUM CHLORIDE 9 MG/ML
INJECTION, SOLUTION INTRAVENOUS CONTINUOUS
Status: DISCONTINUED | OUTPATIENT
Start: 2023-07-18 | End: 2023-07-19 | Stop reason: HOSPADM

## 2023-07-18 RX ADMIN — Medication 2000 MG: at 15:43

## 2023-07-18 RX ADMIN — PROPOFOL 75 MCG/KG/MIN: 10 INJECTION, EMULSION INTRAVENOUS at 07:40

## 2023-07-18 RX ADMIN — FENTANYL CITRATE 50 MCG: 50 INJECTION, SOLUTION INTRAMUSCULAR; INTRAVENOUS at 11:22

## 2023-07-18 RX ADMIN — Medication 2000 MG: at 23:27

## 2023-07-18 RX ADMIN — Medication 100 MCG: at 08:25

## 2023-07-18 RX ADMIN — LIDOCAINE HYDROCHLORIDE 50 MG: 20 INJECTION, SOLUTION EPIDURAL; INFILTRATION; INTRACAUDAL; PERINEURAL at 07:40

## 2023-07-18 RX ADMIN — MIDAZOLAM 1 MG: 1 INJECTION INTRAMUSCULAR; INTRAVENOUS at 07:38

## 2023-07-18 RX ADMIN — ACETAMINOPHEN 1000 MG: 500 TABLET ORAL at 07:10

## 2023-07-18 RX ADMIN — OXYCODONE HYDROCHLORIDE 5 MG: 5 TABLET ORAL at 23:28

## 2023-07-18 RX ADMIN — SODIUM CHLORIDE, POTASSIUM CHLORIDE, SODIUM LACTATE AND CALCIUM CHLORIDE: 600; 310; 30; 20 INJECTION, SOLUTION INTRAVENOUS at 08:56

## 2023-07-18 RX ADMIN — Medication 100 MCG: at 08:49

## 2023-07-18 RX ADMIN — Medication 100 MCG: at 09:01

## 2023-07-18 RX ADMIN — SODIUM CHLORIDE: 9 INJECTION, SOLUTION INTRAVENOUS at 12:40

## 2023-07-18 RX ADMIN — Medication 10 MG: at 08:55

## 2023-07-18 RX ADMIN — Medication 100 MCG: at 08:20

## 2023-07-18 RX ADMIN — ACETAMINOPHEN 650 MG: 325 TABLET ORAL at 12:36

## 2023-07-18 RX ADMIN — TRANEXAMIC ACID 1000 MG: 100 INJECTION, SOLUTION INTRAVENOUS at 07:41

## 2023-07-18 RX ADMIN — BISACODYL 5 MG: 5 TABLET, COATED ORAL at 12:36

## 2023-07-18 RX ADMIN — Medication 100 MCG: at 08:28

## 2023-07-18 RX ADMIN — Medication 10 MG: at 08:39

## 2023-07-18 RX ADMIN — Medication 200 MCG: at 08:42

## 2023-07-18 RX ADMIN — Medication 100 MCG: at 09:25

## 2023-07-18 RX ADMIN — SODIUM CHLORIDE: 9 INJECTION, SOLUTION INTRAVENOUS at 20:28

## 2023-07-18 RX ADMIN — Medication 20 MG: at 09:05

## 2023-07-18 RX ADMIN — HYDRALAZINE HYDROCHLORIDE 25 MG: 25 TABLET, FILM COATED ORAL at 14:43

## 2023-07-18 RX ADMIN — OXYCODONE HYDROCHLORIDE 10 MG: 5 TABLET ORAL at 18:24

## 2023-07-18 RX ADMIN — POLYETHYLENE GLYCOL 3350 17 G: 17 POWDER, FOR SOLUTION ORAL at 12:36

## 2023-07-18 RX ADMIN — BUPIVACAINE HYDROCHLORIDE IN DEXTROSE 10.5 MG: 7.5 INJECTION, SOLUTION SUBARACHNOID at 07:30

## 2023-07-18 RX ADMIN — Medication 10 MG: at 08:31

## 2023-07-18 RX ADMIN — DILTIAZEM HYDROCHLORIDE 240 MG: 240 CAPSULE, COATED, EXTENDED RELEASE ORAL at 14:43

## 2023-07-18 RX ADMIN — ACETAMINOPHEN 650 MG: 325 TABLET ORAL at 20:25

## 2023-07-18 RX ADMIN — Medication 100 MCG: at 08:35

## 2023-07-18 RX ADMIN — SODIUM CHLORIDE, POTASSIUM CHLORIDE, SODIUM LACTATE AND CALCIUM CHLORIDE: 600; 310; 30; 20 INJECTION, SOLUTION INTRAVENOUS at 06:33

## 2023-07-18 RX ADMIN — MIDAZOLAM HYDROCHLORIDE 2 MG: 1 INJECTION, SOLUTION INTRAMUSCULAR; INTRAVENOUS at 07:05

## 2023-07-18 RX ADMIN — MIDAZOLAM 1 MG: 1 INJECTION INTRAMUSCULAR; INTRAVENOUS at 07:31

## 2023-07-18 RX ADMIN — OXYCODONE HYDROCHLORIDE 5 MG: 5 TABLET ORAL at 11:41

## 2023-07-18 RX ADMIN — TRIAMTERENE AND HYDROCHLOROTHIAZIDE 1 TABLET: 37.5; 25 TABLET ORAL at 14:43

## 2023-07-18 RX ADMIN — Medication 100 MCG: at 09:16

## 2023-07-18 RX ADMIN — TRANEXAMIC ACID 1000 MG: 100 INJECTION, SOLUTION INTRAVENOUS at 08:57

## 2023-07-18 RX ADMIN — Medication 2000 MG: at 07:35

## 2023-07-18 ASSESSMENT — PAIN SCALES - GENERAL
PAINLEVEL_OUTOF10: 5
PAINLEVEL_OUTOF10: 2
PAINLEVEL_OUTOF10: 8
PAINLEVEL_OUTOF10: 6
PAINLEVEL_OUTOF10: 3
PAINLEVEL_OUTOF10: 6

## 2023-07-18 ASSESSMENT — PAIN DESCRIPTION - ORIENTATION
ORIENTATION: RIGHT

## 2023-07-18 ASSESSMENT — PAIN DESCRIPTION - DESCRIPTORS
DESCRIPTORS: ACHING;THROBBING
DESCRIPTORS: ACHING;DISCOMFORT
DESCRIPTORS: DULL
DESCRIPTORS: ACHING
DESCRIPTORS: DULL;ACHING;SHOOTING
DESCRIPTORS: ACHING;DISCOMFORT
DESCRIPTORS: ACHING;THROBBING

## 2023-07-18 ASSESSMENT — PAIN DESCRIPTION - LOCATION
LOCATION: HIP
LOCATION: HIP
LOCATION: HIP;LEG
LOCATION: HIP

## 2023-07-18 ASSESSMENT — PAIN DESCRIPTION - PAIN TYPE
TYPE: SURGICAL PAIN

## 2023-07-18 ASSESSMENT — PAIN DESCRIPTION - FREQUENCY
FREQUENCY: CONTINUOUS

## 2023-07-18 ASSESSMENT — PAIN DESCRIPTION - ONSET
ONSET: ON-GOING

## 2023-07-18 ASSESSMENT — PAIN - FUNCTIONAL ASSESSMENT
PAIN_FUNCTIONAL_ASSESSMENT: PREVENTS OR INTERFERES SOME ACTIVE ACTIVITIES AND ADLS
PAIN_FUNCTIONAL_ASSESSMENT: 0-10
PAIN_FUNCTIONAL_ASSESSMENT: PREVENTS OR INTERFERES SOME ACTIVE ACTIVITIES AND ADLS

## 2023-07-18 NOTE — DISCHARGE INSTRUCTIONS
ANY ORTHOPEDIC QUESTIONS OR ANY OTHER CONCERNS YOU MAY CALL THE ORTHOPEDIC COORDINATOR:  Heriberto White RN, MSN  [phone]  [email or web address]    DISCHARGE INSTRUCTIONS  Caring for yourself after joint replacement surgery (Total Hip and Total Knee Replacement)    Activity and Therapy  Receive physical therapy three times per week. (Pain medication one hour prior to therapy)   Perform PT exercises on own when not receiving home or outpatient PT. Ideally exercises should be at least two times a day. Increase level of activity and ambulation each day. Perform deep breathing exercises daily. Patient provides self-care when possible. Work on Range of motion for Total knee patients. No pillow under the knee for Total knee patients. Elevate the surgical leg when seated. No driving until cleared by surgeon      Diet:  Increase oral intake of fruits, fiber and water to prevent constipation. Drink fluids frequently and take stool softeners to aid in bowel motility. Increase protein intake/reduce high-sugar intake to help promote healing and prevent infection. Incision Care:  Keep Aquacel or other dressing intact until seen and removed by surgeon, unless saturated, in which case, call surgeon and request instructions. If dressing falls off, call surgeon. [facility] OUT[facility] on in the am and off in the pm to reduce swelling. Ice affected area four times a day, for twenty minutes. Pain Medications and Anticoagulant  You have been place on an anticoagulant to prevent blood clots. Take this medication exactly as prescribed. Be alert for signs of bleeding. Take care not to injure yourself. You have been provided pain medicine to control your pain. Do not take more narcotics than prescribed. You may begin weaning from narcotics as your pain level improves by decreasing the amount or frequency of the narcotics. You may also take plain acetaminophen as an alternate to the narcotics.    Never exceed the

## 2023-07-18 NOTE — OP NOTE
proceeded forward with surgery. After standard sterile preparation and draping of the right lower extremity was complete we moved forward with surgery. The ASIS was identified and our incision was drawn out starting 2cm distal and 2cm posterior to this point aiming towards the lateral facet of the patella. A #10 blade was used to make an 8cm incision and bovie electrocautery was used for sharp dissection down through subcutaneous tissue and fat. The fascia overlying the TFL muscle belly was identified and cleared off with a Santo elevator. A new #10 blade was used to incise the fascia in line with the skin incision. An Chiara clamp was used to grab the anterior portion of the fascia and the TFL muscle belly was bluntly  from the overlying fascia until the muscle interval between Sartorius and TFL was identified. A large self retaining retractor was then placed into the interval. Blunt dissection was carried out through the fatty tissue until the penetrating vessels were identified. These vessels were then coagulated with the Aquamantys and Bovie. They were then sharply cut. Next the interval between the rectus and gluteus medius was identified and blunt dissection was used to develop this. Finally the pericapsular fat was identified and excised. A small portion of the insertion of the indirect head of the rectus was released off the supra-acetabular pelvis. Next we performed an anterior capsulectomy and sharply divided the capsule from the pelvis directly posterior to the abductor tubercle on the lateral femur. We then removed the majority of the anterior capsule. Next, under the guide of fluoroscopy, our neck cut was made approximately 1cm proximal to the lesser trochanter. The head and neck were removed and sized. We then removed the july-acetabular labrum and any remaining capsule. At this time we began preparation of the acetabulum with reaming.  Once we felt we had medialized enough

## 2023-07-18 NOTE — ANESTHESIA PROCEDURE NOTES
Spinal Block    Patient location during procedure: OR  End time: 7/18/2023 7:30 AM  Reason for block: procedure for pain, post-op pain management, primary anesthetic and at surgeon's request  Staffing  Performed: resident/CRNA   Anesthesiologist: Dimas Arreola MD  Resident/CRNA: Susan Vincent APRN - CRNA  Spinal Block  Patient position: sitting  Prep: ChloraPrep  Patient monitoring: continuous pulse ox and frequent blood pressure checks  Approach: midline  Location: L2/L3  Guidance: paresthesia technique  Provider prep: mask and sterile gloves  Local infiltration: lidocaine  Needle  Needle type:  Nate   Needle gauge: 24 G  Needle length: 3.5 in  Assessment  Sensory level: T10  Swirl obtained: Yes  CSF: clear  Attempts: 2  Hemodynamics: stable  Preanesthetic Checklist  Completed: patient identified, IV checked, site marked, risks and benefits discussed, surgical/procedural consents, equipment checked, pre-op evaluation, timeout performed, anesthesia consent given, oxygen available, monitors applied/VS acknowledged, fire risk safety assessment completed and verbalized and blood product R/B/A discussed and consented

## 2023-07-18 NOTE — ANESTHESIA POSTPROCEDURE EVALUATION
Department of Anesthesiology  Postprocedure Note    Patient: Ron Quesada  MRN: [id number]  YOB: 1970  Date of evaluation: 7/18/2023      Procedure Summary     Date: 07/18/23 Room / Location: [address] - INPATIENT    Anesthesia Start: 0720 Anesthesia Stop: 8132    Procedure: RIGHT HIP TOTAL ARTHROPLASTY ANTERIOR APPROACH (Right: Hip) Diagnosis:       Arthritis of right hip      (Arthritis of right hip [M16.11])    Surgeons: Jesika Clark DO Responsible Provider: Opal Newton MD    Anesthesia Type: spinal ASA Status: 2          Anesthesia Type: No value filed.     Collin Phase I: Collin Score: 10    Collin Phase II:        Anesthesia Post Evaluation    Complications: no

## 2023-07-19 VITALS
HEART RATE: 69 BPM | HEIGHT: 69 IN | BODY MASS INDEX: 25.18 KG/M2 | RESPIRATION RATE: 16 BRPM | DIASTOLIC BLOOD PRESSURE: 64 MMHG | WEIGHT: 170 LBS | OXYGEN SATURATION: 95 % | TEMPERATURE: 98.6 F | SYSTOLIC BLOOD PRESSURE: 103 MMHG

## 2023-07-19 LAB
ANION GAP SERPL CALCULATED.3IONS-SCNC: 10 MMOL/L (ref 9–17)
BASOPHILS # BLD: 0 K/UL (ref 0–0.2)
BASOPHILS NFR BLD: 0 %
BUN SERPL-MCNC: 7 MG/DL (ref 6–20)
BUN/CREAT SERPL: 12 (ref 9–20)
CALCIUM SERPL-MCNC: 8.1 MG/DL (ref 8.6–10.4)
CHLORIDE SERPL-SCNC: 101 MMOL/L (ref 98–107)
CO2 SERPL-SCNC: 24 MMOL/L (ref 20–31)
CREAT SERPL-MCNC: 0.6 MG/DL (ref 0.7–1.2)
EOSINOPHIL # BLD: 0.06 K/UL (ref 0–0.4)
EOSINOPHILS RELATIVE PERCENT: 1 % (ref 1–4)
ERYTHROCYTE [DISTWIDTH] IN BLOOD BY AUTOMATED COUNT: 12.6 % (ref 11.8–14.4)
GFR SERPL CREATININE-BSD FRML MDRD: >60 ML/MIN/1.73M2
GLUCOSE SERPL-MCNC: 121 MG/DL (ref 70–99)
HCT VFR BLD AUTO: 26.5 % (ref 40.7–50.3)
HGB BLD-MCNC: 8.8 G/DL (ref 13–17)
IMM GRANULOCYTES # BLD AUTO: 0.06 K/UL (ref 0–0.3)
IMM GRANULOCYTES NFR BLD: 1 %
LYMPHOCYTES # BLD: 8 % (ref 24–44)
LYMPHOCYTES NFR BLD: 0.5 K/UL (ref 1–4.8)
MCH RBC QN AUTO: 33.7 PG (ref 25.2–33.5)
MCHC RBC AUTO-ENTMCNC: 33.2 G/DL (ref 28.4–34.8)
MCV RBC AUTO: 101.5 FL (ref 82.6–102.9)
MONOCYTES NFR BLD: 0.82 K/UL (ref 0.2–0.8)
MONOCYTES NFR BLD: 13 % (ref 1–7)
NEUTROPHILS NFR BLD: 77 % (ref 36–66)
NEUTS SEG NFR BLD: 4.86 K/UL (ref 1.8–7.7)
NRBC BLD-RTO: 0 PER 100 WBC
PLATELET # BLD AUTO: 87 K/UL (ref 138–453)
PMV BLD AUTO: 10.7 FL (ref 8.1–13.5)
POTASSIUM SERPL-SCNC: 3.3 MMOL/L (ref 3.7–5.3)
RBC # BLD AUTO: 2.61 M/UL (ref 4.21–5.77)
SODIUM SERPL-SCNC: 135 MMOL/L (ref 135–144)
WBC OTHER # BLD: 6.3 K/UL (ref 3.5–11.3)

## 2023-07-19 PROCEDURE — 97535 SELF CARE MNGMENT TRAINING: CPT

## 2023-07-19 PROCEDURE — 97110 THERAPEUTIC EXERCISES: CPT

## 2023-07-19 PROCEDURE — 85027 COMPLETE CBC AUTOMATED: CPT

## 2023-07-19 PROCEDURE — 2580000003 HC RX 258

## 2023-07-19 PROCEDURE — 97116 GAIT TRAINING THERAPY: CPT

## 2023-07-19 PROCEDURE — 97530 THERAPEUTIC ACTIVITIES: CPT

## 2023-07-19 PROCEDURE — 80048 BASIC METABOLIC PNL TOTAL CA: CPT

## 2023-07-19 PROCEDURE — 36415 COLL VENOUS BLD VENIPUNCTURE: CPT

## 2023-07-19 PROCEDURE — 6370000000 HC RX 637 (ALT 250 FOR IP)

## 2023-07-19 RX ADMIN — BISACODYL 5 MG: 5 TABLET, COATED ORAL at 09:23

## 2023-07-19 RX ADMIN — SODIUM CHLORIDE, PRESERVATIVE FREE 10 ML: 5 INJECTION INTRAVENOUS at 09:24

## 2023-07-19 RX ADMIN — OXYCODONE HYDROCHLORIDE 10 MG: 5 TABLET ORAL at 10:47

## 2023-07-19 RX ADMIN — ACETAMINOPHEN 650 MG: 325 TABLET ORAL at 01:12

## 2023-07-19 RX ADMIN — ASPIRIN 81 MG CHEWABLE TABLET 81 MG: 81 TABLET CHEWABLE at 09:23

## 2023-07-19 RX ADMIN — ACETAMINOPHEN 650 MG: 325 TABLET ORAL at 06:29

## 2023-07-19 RX ADMIN — SODIUM CHLORIDE: 9 INJECTION, SOLUTION INTRAVENOUS at 05:07

## 2023-07-19 RX ADMIN — POLYETHYLENE GLYCOL 3350 17 G: 17 POWDER, FOR SOLUTION ORAL at 09:23

## 2023-07-19 RX ADMIN — OXYCODONE HYDROCHLORIDE 5 MG: 5 TABLET ORAL at 05:12

## 2023-07-19 ASSESSMENT — PAIN DESCRIPTION - DESCRIPTORS
DESCRIPTORS: ACHING
DESCRIPTORS: ACHING;DISCOMFORT
DESCRIPTORS: ACHING;DISCOMFORT

## 2023-07-19 ASSESSMENT — PAIN DESCRIPTION - FREQUENCY
FREQUENCY: CONTINUOUS

## 2023-07-19 ASSESSMENT — PAIN SCALES - GENERAL
PAINLEVEL_OUTOF10: 5
PAINLEVEL_OUTOF10: 3
PAINLEVEL_OUTOF10: 0
PAINLEVEL_OUTOF10: 4
PAINLEVEL_OUTOF10: 2
PAINLEVEL_OUTOF10: 8
PAINLEVEL_OUTOF10: 0

## 2023-07-19 ASSESSMENT — PAIN - FUNCTIONAL ASSESSMENT
PAIN_FUNCTIONAL_ASSESSMENT: ACTIVITIES ARE NOT PREVENTED
PAIN_FUNCTIONAL_ASSESSMENT: PREVENTS OR INTERFERES SOME ACTIVE ACTIVITIES AND ADLS
PAIN_FUNCTIONAL_ASSESSMENT: PREVENTS OR INTERFERES SOME ACTIVE ACTIVITIES AND ADLS

## 2023-07-19 ASSESSMENT — PAIN DESCRIPTION - ONSET
ONSET: ON-GOING

## 2023-07-19 ASSESSMENT — PAIN DESCRIPTION - PAIN TYPE
TYPE: SURGICAL PAIN
TYPE: SURGICAL PAIN

## 2023-07-19 ASSESSMENT — PAIN DESCRIPTION - ORIENTATION
ORIENTATION: RIGHT

## 2023-07-19 ASSESSMENT — PAIN DESCRIPTION - LOCATION
LOCATION: HIP
LOCATION: HIP;LEG
LOCATION: HIP;LEG

## 2023-07-19 NOTE — PLAN OF CARE
PROTOCOLS  NURSING IMPLEMENTED    TOTAL JOINT DVT/PE  VENOUS THROMBOEMBOLISM PROPHYLAXIS  (Nursing Automatically Implement)    Rosana Kaye  [id number]  [unfilled]  7/17/23    YES DVT RISK FACTOR SCORE YES MAJOR BLEEDING RISK FACTORS SCORE     [x] 48years old or greater (1)   [] Hx. Easy Bleeding (1)      [] Heart failure (2)   [x] NSAID Use in Last 5 Days (2)      [] Varicose veins - Hx. (1)   [] Gastrointestinal or Genitourinary bleeding in Last 14 Days (2)      [] Myocardial Infarction - Hx. (1)         [] Cancer - Hx. (2)         [] Atrial fibrillation - Hx. (1)         [] Ischemic Stroke - Hx. (1)         [] Diabetes Mellitus - Hx. (1)         [] Previous DVT/PE - Hx.  (2)         [] Hormone Replacement Therapy (1)         [] Obesity (1)         [] Paralysis (1)         [] Pregnancy (1)         [x] Smoking (1)                   [] Thromophilia (1)   []   Mild to Moderate Bleeding (2)      [x] Total Hip Arthroplasty (1)   [] Active Bleeding (4)      [] Family history of PE or DVT? (4) (Consider the following labs to test for presence of inhibitor deficiency state:) Factor V Leiden, Prothrombin Gene Mutation, Protein S Deficiency, Protien C Deficiency, Antithrombin Deficiency   [] Malignant Hypertension (2)        [] Thrombocytopenia 20k to 100k (2)        [] Thrombocytopenia less than 20k (4)        [] Bleeding Diathesis (4)        [] \"Bloody Stick\" Epidural or Spinal (2)     TOTAL DVT SCORE   TOTAL BLEEDING SCORE      [x] CLASS A   Standard Risk DVT (0-3)    [x] CLASS X Standard Risk Bleeding (0-4)      [] CLASS B Elevated Risk DVT (greater than 3)    [] CLASS Y High Risk Bleeding (greater than 4)     FINAL MATRIX (e.g. AY)       *If allergic to ASA use Warfarin  *BY patient consider no treatment  **Consider venous filter with high risk PE  **If on Coumadin pre-op, then restart night of surgery      [x]  DVT Prophylaxis: Class AX, AY    Ecotrin 81 mg by mouth BID starting day of surgery for 6 weeks for all total
Problem: Discharge Planning  Goal: Discharge to home or other facility with appropriate resources  Outcome: Progressing  Flowsheets (Taken 7/18/2023 1229)  Discharge to home or other facility with appropriate resources:   Identify barriers to discharge with patient and caregiver   Arrange for needed discharge resources and transportation as appropriate   Identify discharge learning needs (meds, wound care, etc)   Refer to discharge planning if patient needs post-hospital services based on physician order or complex needs related to functional status, cognitive ability or social support system     Problem: Pain  Goal: Verbalizes/displays adequate comfort level or baseline comfort level  Outcome: Progressing     Problem: Safety - Adult  Goal: Free from fall injury  Outcome: Progressing  Flowsheets (Taken 7/18/2023 1226)  Free From Fall Injury: Instruct family/caregiver on patient safety     Problem: ABCDS Injury Assessment  Goal: Absence of physical injury  Outcome: Progressing
Problem: Discharge Planning  Goal: Discharge to home or other facility with appropriate resources  Outcome: Progressing  Flowsheets (Taken 7/19/2023 0800)  Discharge to home or other facility with appropriate resources:   Identify barriers to discharge with patient and caregiver   Arrange for needed discharge resources and transportation as appropriate   Identify discharge learning needs (meds, wound care, etc)   Refer to discharge planning if patient needs post-hospital services based on physician order or complex needs related to functional status, cognitive ability or social support system     Problem: Pain  Goal: Verbalizes/displays adequate comfort level or baseline comfort level  Outcome: Progressing     Problem: Safety - Adult  Goal: Free from fall injury  Outcome: Progressing     Problem: ABCDS Injury Assessment  Goal: Absence of physical injury  Outcome: Progressing     Problem: Neurosensory - Adult  Goal: Achieves stable or improved neurological status  Outcome: Progressing  Flowsheets (Taken 7/19/2023 0800)  Achieves stable or improved neurological status: Assess for and report changes in neurological status     Problem: Skin/Tissue Integrity - Adult  Goal: Skin integrity remains intact  Outcome: Progressing  Flowsheets (Taken 7/19/2023 0800)  Skin Integrity Remains Intact: Monitor for areas of redness and/or skin breakdown  Goal: Incisions, wounds, or drain sites healing without S/S of infection  Outcome: Progressing  Flowsheets (Taken 7/19/2023 0800)  Incisions, Wounds, or Drain Sites Healing Without Sign and Symptoms of Infection:   ADMISSION and DAILY: Assess and document risk factors for pressure ulcer development   TWICE DAILY: Assess and document dressing/incision, wound bed, drain sites and surrounding tissue     Problem: Musculoskeletal - Adult  Goal: Return mobility to safest level of function  Outcome: Progressing  Flowsheets (Taken 7/19/2023 0800)  Return Mobility to Safest Level of
skin integrity   TWICE DAILY: Assess and document dressing/incision, wound bed, drain sites and surrounding tissue     Problem: Musculoskeletal - Adult  Goal: Return mobility to safest level of function  Outcome: Progressing  Flowsheets (Taken 7/18/2023 2029)  Return Mobility to Safest Level of Function:   Assess patient stability and activity tolerance for standing, transferring and ambulating with or without assistive devices   Assist with transfers and ambulation using safe patient handling equipment as needed   Ensure adequate protection for wounds/incisions during mobilization   Obtain physical therapy/occupational therapy consults as needed   Apply continuous passive motion per provider or physical therapy orders to increase flexion toward goal   Instruct patient/family in ordered activity level     Problem: Musculoskeletal - Adult  Goal: Maintain proper alignment of affected body part  Outcome: Progressing  Flowsheets (Taken 7/18/2023 2029)  Maintain proper alignment of affected body part:   Support and protect limb and body alignment per provider's orders   Instruct and reinforce with patient and family use of appropriate assistive device and precautions (e.g. spinal or hip dislocation precautions)     Problem: Musculoskeletal - Adult  Goal: Return ADL status to a safe level of function  Outcome: Progressing  Flowsheets (Taken 7/18/2023 2029)  Return ADL Status to a Safe Level of Function:   Administer medication as ordered   Assess activities of daily living deficits and provide assistive devices as needed   Obtain physical therapy/occupational therapy consults as needed   Assist and instruct patient to increase activity and self care as tolerated

## 2023-07-25 ENCOUNTER — HOSPITAL ENCOUNTER (OUTPATIENT)
Dept: PHYSICAL THERAPY | Facility: CLINIC | Age: 53
Setting detail: THERAPIES SERIES
Discharge: HOME | End: 2023-07-25
Attending: ORTHOPAEDIC SURGERY
Payer: COMMERCIAL

## 2023-07-25 PROCEDURE — 97016 VASOPNEUMATIC DEVICE THERAPY: CPT

## 2023-07-25 PROCEDURE — 97110 THERAPEUTIC EXERCISES: CPT

## 2023-07-25 PROCEDURE — 97162 PT EVAL MOD COMPLEX 30 MIN: CPT

## 2023-07-28 ENCOUNTER — HOSPITAL ENCOUNTER (OUTPATIENT)
Dept: PHYSICAL THERAPY | Facility: CLINIC | Age: 53
Setting detail: THERAPIES SERIES
Discharge: HOME | End: 2023-07-28
Attending: ORTHOPAEDIC SURGERY
Payer: COMMERCIAL

## 2023-07-28 DIAGNOSIS — Z96.641 S/P TOTAL RIGHT HIP ARTHROPLASTY: Primary | ICD-10-CM

## 2023-07-28 PROCEDURE — 97016 VASOPNEUMATIC DEVICE THERAPY: CPT

## 2023-07-28 PROCEDURE — 97110 THERAPEUTIC EXERCISES: CPT

## 2023-07-28 NOTE — FLOWSHEET NOTE
[] [address]  4600 [facility] [place] [facility].  [phone]  F: [phone] [] 204 [facility]  [address]  P: [phone]  F: [phone] [] [address]  151 [facility]  P: [phone]  F: [phone] [x] [facility]: [phone]  F: [phone] [] 224 [facility]  One [facility]   [address]   P: [phone]  F: [phone]  [] [address] [place].   P: [phone]  F: [phone] [] [address] [facility]  [address] [facility] [address]  P: [phone]  F: [phone] [] 224 [facility]  7[address] [facility]  [place]: [phone]  F: [phone] [] 1 Medical [place] [facility] [address]  [place]: [phone]  F: [phone]      Physical Therapy Daily Treatment Note    Date:  2023  Patient Name:  Romana Kand    :  1970  MRN: [id number]  Physician: Mirna Sheridan DO                                     Insurance: [place] FEP; Jonah yr; 50/50vs; no auth req; hard max; PT/OT/ST comb; $40 copay; no ded or coins; 6500/6,338.44 remaining OOP  Medical Diagnosis:   Z98.890 (ICD-10-CM) - Status post surgery   M16.11 (ICD-10-CM) - Arthritis of right hip   Rehab Codes: M25.551 Right hip pain  Onset date: 23               Next 's appt. : 23  Visit# / total visits: 2    Cancels/No Shows: 0/0    Subjective:    Pain:  [] Yes  [x] No Location: R hip Pain Rating: (0-10 scale) 0/10  Pain altered Tx:  [] No  [] Yes  Action:  Comments: Pt arrives reporting that he

## 2023-08-01 ENCOUNTER — HOSPITAL ENCOUNTER (OUTPATIENT)
Dept: PHYSICAL THERAPY | Facility: CLINIC | Age: 53
Setting detail: THERAPIES SERIES
Discharge: HOME | End: 2023-08-01
Attending: ORTHOPAEDIC SURGERY
Payer: COMMERCIAL

## 2023-08-01 PROCEDURE — 97110 THERAPEUTIC EXERCISES: CPT

## 2023-08-01 PROCEDURE — 97016 VASOPNEUMATIC DEVICE THERAPY: CPT

## 2023-08-01 NOTE — FLOWSHEET NOTE
Demo  [] Written  Comprehension of Education:  [] Verbalizes understanding. [] Demonstrates understanding. [] Needs review. [] Demonstrates/verbalizes HEP/Ed previously given. Access Code: [email or web address]  URL: [email or web address]  Date: 07/28/2023  Prepared by: Dora Roberts    Exercises  - Supine Gluteal Sets  - 3 x daily - 7 x weekly - 2 sets - 10 reps - 5 hold  - Supine Lower Trunk Rotation  - 3 x daily - 7 x weekly - 2 sets - 10 reps  - Supine Posterior Pelvic Tilt  - 3 x daily - 7 x weekly - 2 sets - 10 reps - 2 hold  - Supine March  - 3 x daily - 7 x weekly - 2 sets - 5 reps  - Supine Heel Slide  - 1 x daily - 7 x weekly - 3 sets - 10 reps  - Supine Hip Abduction  - 1 x daily - 7 x weekly - 3 sets - 10 reps  - Supine Knee Extension Strengthening  - 1 x daily - 7 x weekly - 3 sets - 10 reps  - Seated Long Arc Quad  - 1 x daily - 7 x weekly - 3 sets - 10 reps  - Standing 3-Way Kick  - 1 x daily - 7 x weekly - 3 sets - 10 reps     Plan: [x] Continue current frequency toward long and short term goals.     [] Specific Instructions for subsequent treatments:       Time In: 04:00             Time Out: 4:50    Electronically signed by:  Neva Domínguez PTA

## 2023-08-03 ENCOUNTER — OFFICE VISIT (OUTPATIENT)
Dept: ORTHOPEDIC SURGERY | Age: 53
End: 2023-08-03

## 2023-08-03 VITALS — BODY MASS INDEX: 24.44 KG/M2 | HEIGHT: 69 IN | WEIGHT: 165 LBS | RESPIRATION RATE: 14 BRPM

## 2023-08-03 DIAGNOSIS — M16.11 ARTHRITIS OF RIGHT HIP: Primary | ICD-10-CM

## 2023-08-03 DIAGNOSIS — Z96.642 STATUS POST TOTAL HIP REPLACEMENT, LEFT: ICD-10-CM

## 2023-08-03 PROCEDURE — 99024 POSTOP FOLLOW-UP VISIT: CPT | Performed by: ORTHOPAEDIC SURGERY

## 2023-08-04 ENCOUNTER — HOSPITAL ENCOUNTER (OUTPATIENT)
Dept: PHYSICAL THERAPY | Facility: CLINIC | Age: 53
Setting detail: THERAPIES SERIES
Discharge: HOME | End: 2023-08-04
Attending: ORTHOPAEDIC SURGERY
Payer: COMMERCIAL

## 2023-08-04 PROCEDURE — 97110 THERAPEUTIC EXERCISES: CPT

## 2023-08-04 PROCEDURE — 97016 VASOPNEUMATIC DEVICE THERAPY: CPT

## 2023-08-04 ASSESSMENT — ENCOUNTER SYMPTOMS
EYE DISCHARGE: 0
SHORTNESS OF BREATH: 0
ABDOMINAL PAIN: 0
ROS SKIN COMMENTS: NEGATIVE FOR RASH

## 2023-08-04 NOTE — FLOWSHEET NOTE
rail.  Patient will perform all house work without restriction. Patient will resume work as a . Patient goals: Return to work. Pt. Education:  [x] Yes  [] No  [] Reviewed Prior HEP/Ed  Method of Education: [x] Verbal  [] Demo  [] Written  Comprehension of Education:  [] Verbalizes understanding. [] Demonstrates understanding. [] Needs review. [] Demonstrates/verbalizes HEP/Ed previously given. Access Code: [email or web address]  URL: [email or web address]/  Date: 08/04/2023  Prepared by: Kameron Montez    Exercises  - Supine Gluteal Sets  - 3 x daily - 7 x weekly - 2 sets - 10 reps - 5 hold  - Supine Lower Trunk Rotation  - 3 x daily - 7 x weekly - 2 sets - 10 reps  - Supine Posterior Pelvic Tilt  - 3 x daily - 7 x weekly - 2 sets - 10 reps - 2 hold  - Supine March  - 3 x daily - 7 x weekly - 2 sets - 5 reps  - Supine Heel Slide  - 1 x daily - 7 x weekly - 3 sets - 10 reps  - Supine Hip Abduction  - 1 x daily - 7 x weekly - 3 sets - 10 reps  - Supine Knee Extension Strengthening  - 1 x daily - 7 x weekly - 3 sets - 10 reps  - Seated Long Arc Quad  - 1 x daily - 7 x weekly - 3 sets - 10 reps  - Standing 3-Way Kick  - 1 x daily - 7 x weekly - 3 sets - 10 reps  - Supine Bridge  - 1 x daily - 7 x weekly - 3 sets - 10 reps  - Clamshell  - 1 x daily - 7 x weekly - 3 sets - 10 reps    Plan: [x] Continue current frequency toward long and short term goals.     [] Specific Instructions for subsequent treatments:       Time In: 1:00 pm            Time Out: 2:00 pm     Electronically signed by:  Kameron Montez PTA

## 2023-08-07 ENCOUNTER — HOSPITAL ENCOUNTER (OUTPATIENT)
Dept: PHYSICAL THERAPY | Facility: CLINIC | Age: 53
Setting detail: THERAPIES SERIES
Discharge: HOME | End: 2023-08-07
Attending: ORTHOPAEDIC SURGERY
Payer: COMMERCIAL

## 2023-08-07 PROCEDURE — 97110 THERAPEUTIC EXERCISES: CPT

## 2023-08-07 NOTE — FLOWSHEET NOTE
[] [address]  4600 [facility].  [phone]  F: [phone] [] 204 [facility]  [address]   [address]  P: [phone]  F: [phone] [] [address]  151 [facility]  P: [phone]  F: [phone] [x] Marlo Karuna: [phone]  F: [phone] [] 224 [facility]  One [facility]   [address]   P: [phone]  F: [phone]  [] [address] [facility].   P: [phone]  F: [phone] [] [address]  [address]  P: [phone]  F: [phone] [] 224 [facility]  795 [facility]  [place]: [phone]  F: [phone] [] 1 Medical [place] [facility] [address]  [place]: [phone]  F: [phone]      Physical Therapy Daily Treatment Note    Date:  2023  Patient Name:  Anahi Wilson    :  1970  MRN: [id number]  Physician: Nicola Riley DO                                     Insurance: [place] FEP; Jonah yr; 50/50vs; no auth req; hard max; PT/OT/ST comb; $40 copay; no ded or coins; 6500/6,338.44 remaining OOP  Medical Diagnosis:   Z98.890 (ICD-10-CM) - Status post surgery   M16.11 (ICD-10-CM) - Arthritis of right hip   Rehab Codes: M25.551 Right hip pain  Onset date: 23               Next 's appt.: 8/3/23  Visit# / total visits:     Cancels/No Shows: 0/0    Subjective:    Pain:  [] Yes  [x] No Location: R hip Pain Rating: (0-10 scale) 0/10  Pain altered Tx:  [] No  [] Yes  Action:  Comments: Patient reports improved hip

## 2023-08-14 ENCOUNTER — HOSPITAL ENCOUNTER (OUTPATIENT)
Dept: PHYSICAL THERAPY | Facility: CLINIC | Age: 53
Setting detail: THERAPIES SERIES
Discharge: HOME | End: 2023-08-14
Attending: ORTHOPAEDIC SURGERY
Payer: COMMERCIAL

## 2023-08-14 PROCEDURE — 97110 THERAPEUTIC EXERCISES: CPT

## 2023-08-14 PROCEDURE — 97112 NEUROMUSCULAR REEDUCATION: CPT

## 2023-08-14 NOTE — FLOWSHEET NOTE
treatments)  Patient will demonstrate normalized gait pattern. Patient will ascend and descend stairs step over step without use of rail. Patient will perform all house work without restriction. Patient will resume work as a . Patient goals: Return to work. Pt. Education:  [x] Yes  [] No  [x] Reviewed Prior HEP/Ed  Method of Education: [x] Verbal  [] Demo  [] Written  Comprehension of Education:  [] Verbalizes understanding. [x] Demonstrates understanding. [] Needs review. [] Demonstrates/verbalizes HEP/Ed previously given. Access Code: [email or web address]  URL: [email or web address]  Date: 08/04/2023  Prepared by: Jasmyn April    Exercises  - Supine Gluteal Sets  - 3 x daily - 7 x weekly - 2 sets - 10 reps - 5 hold  - Supine Lower Trunk Rotation  - 3 x daily - 7 x weekly - 2 sets - 10 reps  - Supine Posterior Pelvic Tilt  - 3 x daily - 7 x weekly - 2 sets - 10 reps - 2 hold  - Supine March  - 3 x daily - 7 x weekly - 2 sets - 5 reps  - Supine Heel Slide  - 1 x daily - 7 x weekly - 3 sets - 10 reps  - Supine Hip Abduction  - 1 x daily - 7 x weekly - 3 sets - 10 reps  - Supine Knee Extension Strengthening  - 1 x daily - 7 x weekly - 3 sets - 10 reps  - Seated Long Arc Quad  - 1 x daily - 7 x weekly - 3 sets - 10 reps  - Standing 3-Way Kick  - 1 x daily - 7 x weekly - 3 sets - 10 reps  - Supine Bridge  - 1 x daily - 7 x weekly - 3 sets - 10 reps  - Clamshell  - 1 x daily - 7 x weekly - 3 sets - 10 reps    Plan: [x] Continue current frequency toward long and short term goals.     [] Specific Instructions for subsequent treatments:       Time In: 10:25 am            Time Out: 11:20 am     Electronically signed by:  Yamel Call PT

## 2023-08-17 ENCOUNTER — HOSPITAL ENCOUNTER (OUTPATIENT)
Dept: PHYSICAL THERAPY | Facility: CLINIC | Age: 53
Setting detail: THERAPIES SERIES
Discharge: HOME | End: 2023-08-17
Attending: ORTHOPAEDIC SURGERY
Payer: COMMERCIAL

## 2023-08-17 PROCEDURE — 97110 THERAPEUTIC EXERCISES: CPT

## 2023-08-17 PROCEDURE — 97112 NEUROMUSCULAR REEDUCATION: CPT

## 2023-08-17 NOTE — FLOWSHEET NOTE
[] [address]  4600 [facility].  [phone]  F: [phone] [] 204 [facility]  [address]   [address]  P: [phone]  F: [phone] [] [address]  151 [facility]  P: [phone]  F: [phone] [x] Marlo Karuna: [phone]  F: [phone] [] 224 [facility]  One [facility]   [address]   P: [phone]  F: [phone]  [] 7170 [facility].   P: [phone]  F: [phone] [] 205 [facility]  [address] [facility]   [address]  P: [phone]  F: [phone] [] 224 [facility]  7[address] [facility]  [place]: [phone]  F: [phone] [] 1 Medical [facility] [facility] [address]  [place]: [phone]  F: [phone]      Physical Therapy Daily Treatment Note    Date:  2023  Patient Name:  Elisabeth Teague    :  1970  MRN: [id number]  Physician: Dilshad Osborne DO                                     Insurance: [place] FEP; Jonah yr; 50/50vs; no auth req; hard max; PT/OT/ST comb; $40 copay; no ded or coins; 6500/6,338.44 remaining OOP  Medical Diagnosis:   Z98.890 (ICD-10-CM) - Status post surgery   M16.11 (ICD-10-CM) - Arthritis of right hip   Rehab Codes: M25.551 Right hip pain  Onset date: 23               Next 's appt.: 8/3/23  Visit# / total visits:     Cancels/No Shows: 0/0    Subjective:    Pain:  [] Yes  [x] No Location: R hip Pain Rating: (0-10 scale) 0/10  Pain altered Tx:  [] No  [] Yes  Action:  Comments:Patient notes an overall

## 2023-08-21 ENCOUNTER — HOSPITAL ENCOUNTER (OUTPATIENT)
Dept: PHYSICAL THERAPY | Facility: CLINIC | Age: 53
Setting detail: THERAPIES SERIES
Discharge: HOME | End: 2023-08-21
Attending: ORTHOPAEDIC SURGERY
Payer: COMMERCIAL

## 2023-08-21 PROCEDURE — 97110 THERAPEUTIC EXERCISES: CPT

## 2023-08-21 NOTE — FLOWSHEET NOTE
[] [address]  4600 [facility].  [phone]  F: [phone] [] 204 [facility]  [address]   [address]  P: [phone]  F: [phone] [] [address]  151 [facility]  P: [phone]  F: [phone] [x] Marlo Karuna: [phone]  F: [phone] [] 224 [facility]  One [facility]   [address]   P: [phone]  F: [phone]  [] [address] [facility].   P: [phone]  F: [phone] [] 205 [facility]  [address] [facility]   [address]  P: [phone]  F: [phone] [] 224 [facility]  795 [facility]  [place]: [phone]  F: [phone] [] 1 Medical [place] [facility] [address]  [place]: [phone]  F: [phone]      Physical Therapy Daily Treatment Note    Date:  2023  Patient Name:  Francisco Willett    :  1970  MRN: [id number]  Physician: Zaina Rankin DO                                     Insurance: [place] FEP; Jonah yr; 50/50vs; no auth req; hard max; PT/OT/ST comb; $40 copay; no ded or coins; 6500/6,338.44 remaining OOP  Medical Diagnosis:   Z98.890 (ICD-10-CM) - Status post surgery   M16.11 (ICD-10-CM) - Arthritis of right hip   Rehab Codes: M25.551 Right hip pain  Onset date: 23               Next 's appt.: 8/3/23  Visit# / total visits:     Cancels/No Shows: 0/0    Subjective:    Pain:  [] Yes  [x] No Location: R hip Pain Rating: (0-10 scale) 0/10  Pain altered Tx:  [] No  [] Yes  Action:  Comments: No pain at rest on arrival.

## 2023-08-24 ENCOUNTER — HOSPITAL ENCOUNTER (OUTPATIENT)
Dept: PHYSICAL THERAPY | Facility: CLINIC | Age: 53
Setting detail: THERAPIES SERIES
Discharge: HOME | End: 2023-08-24
Attending: ORTHOPAEDIC SURGERY
Payer: COMMERCIAL

## 2023-08-24 PROCEDURE — 97110 THERAPEUTIC EXERCISES: CPT

## 2023-08-24 NOTE — FLOWSHEET NOTE
[] [address]  [address] [facility].  [phone]  F: [phone] [] 204 [facility]  [address]   [address]  P: [phone]  F: [phone] [] [address]  151 [facility]  P: [phone]  F: [phone] [x] [facility]: [phone]  F: [phone] [] 224 [facility]  One [facility]   [address]   P: [phone]  F: [phone]  [] [address] [facility].   P: [phone]  F: [phone] [] 205 [facility]  [address] [facility]   [address]  P: [phone]  F: [phone] [] 224 [facility]  7[address] [facility]  Sheralyn Peaks: [phone]  F: [phone] [] 1 Medical [place] [facility] [address]  Sheralyn Peaks: [phone]  F: [phone]      Physical Therapy Daily Treatment Note    Date:  2023  Patient Name:  Semaj Roberts    :  1970  MRN: [id number]  Physician: Halley Marquez DO                                     Insurance: [place] FEP; Jonah yr; 50/50vs; no auth req; hard max; PT/OT/ST comb; $40 copay; no ded or coins; 6500/6,338.44 remaining OOP  Medical Diagnosis:   Z98.890 (ICD-10-CM) - Status post surgery   M16.11 (ICD-10-CM) - Arthritis of right hip   Rehab Codes: M25.551 Right hip pain  Onset date: 23               Next 's appt.: 8/3/23  Visit# / total visits:     Cancels/No Shows: 0/0    Subjective:    Pain:  [] Yes  [x] No Location: R hip Pain Rating: (0-10 scale) 0/10  Pain altered Tx:  [] No  [] Yes  Action:  Comments: No pain at rest on arrival.

## 2023-08-28 ENCOUNTER — HOSPITAL ENCOUNTER (OUTPATIENT)
Dept: PHYSICAL THERAPY | Facility: CLINIC | Age: 53
Setting detail: THERAPIES SERIES
Discharge: HOME | End: 2023-08-28
Attending: ORTHOPAEDIC SURGERY
Payer: COMMERCIAL

## 2023-08-28 ENCOUNTER — TELEPHONE (OUTPATIENT)
Dept: FAMILY MEDICINE CLINIC | Age: 53
End: 2023-08-28

## 2023-08-28 PROCEDURE — 97110 THERAPEUTIC EXERCISES: CPT

## 2023-08-28 NOTE — TELEPHONE ENCOUNTER
Note created. Notified pt. He would like to stop at the [facility] [address] office on 8/29/23. I let him know I would notify the office it was in his chart ready to be printed. Pt understands.

## 2023-08-28 NOTE — TELEPHONE ENCOUNTER
Pt came into the office requesting a work note specifically stating that he is \"incapacitated for duty from 7/18/23 (surgery date) until re evaluation on next scheduled appt (9/7/23). \"

## 2023-08-28 NOTE — FLOWSHEET NOTE
[] [address]  4600 [facility].  [phone]  F: [phone] [] 204 [facility]  [address]   [address]  P: [phone]  F: [phone] [] [address]  151 [facility]  P: [phone]  F: [phone] [x] Marlo Medinaie: [phone]  F: [phone] [] 224 [facility]  One [facility]   [address]   P: [phone]  F: [phone]  [] 7170 [facility].   P: [phone]  F: [phone] [] 205 [facility]  [address] [place]  [address]  P: [phone]  F: [phone] [] 224 [facility]  [address] [place] [address]  [place]: [phone]  F: [phone] [] 1 Medical [place] [facility] [address]  [place]: [phone]  F: [phone]      Physical Therapy Daily Treatment Note    Date:  2023  Patient Name:  Rosana Kaye    :  1970  MRN: [id number]  Physician: Kyler Patel DO                                     Insurance: [place] FEP; Jonah yr; 50/50vs; no auth req; hard max; PT/OT/ST comb; $40 copay; no ded or coins; 6500/6,338.44 remaining OOP  Medical Diagnosis:   Z98.890 (ICD-10-CM) - Status post surgery   M16.11 (ICD-10-CM) - Arthritis of right hip   Rehab Codes: M25.551 Right hip pain  Onset date: 23               Next 's appt.: 8/3/23  Visit# / total visits: 10/16    Cancels/No Shows: 0/0    Subjective:    Pain:  [] Yes  [x] No Location: R hip Pain Rating: (0-10 scale) 0/10  Pain altered Tx:  [] No  [] Yes  Action:  Comments: Doing well.  No pain just

## 2023-08-31 ENCOUNTER — HOSPITAL ENCOUNTER (OUTPATIENT)
Dept: PHYSICAL THERAPY | Facility: CLINIC | Age: 53
Setting detail: THERAPIES SERIES
Discharge: HOME | End: 2023-08-31
Attending: ORTHOPAEDIC SURGERY
Payer: COMMERCIAL

## 2023-08-31 NOTE — FLOWSHEET NOTE
[] [facility] - [facility]  46[address] [facility].    [phone]  F: [phone]   [] [address]  [address]  P: [phone]  F: [phone]  [] [address]  [address]  P: [phone]  F: [phone] [x] [facility]  P: [phone]  F: [phone]  [] 224 [facility]  [address] [facility]   [address]   Mel[place]ee Jointer: [phone]  F: [phone]   [] [address] [facility]  [address] [facility] [address]  [facility] Jointer: [phone]   F: [phone]     Physical Therapy Cancel/No Show note    Date: 2023  Patient: Romana Kand  : 1970  MRN: [id number]    Cancels/No Shows to date:     For today's appointment patient:    []  Cancelled    [] Rescheduled appointment    [x] No-show     Reason given by patient:    []  Patient ill    []  Conflicting appointment    [] No transportation      [] Conflict with work    [x] No reason given    [] Weather related    [] [id number]    [] Other:      Comments:        [x] Next appointment was confirmed     Electronically signed by: Jaleel Caceres PTA

## 2023-09-05 ENCOUNTER — HOSPITAL ENCOUNTER (OUTPATIENT)
Dept: PHYSICAL THERAPY | Facility: CLINIC | Age: 53
Setting detail: THERAPIES SERIES
Discharge: HOME | End: 2023-09-05
Attending: ORTHOPAEDIC SURGERY
Payer: COMMERCIAL

## 2023-09-05 PROCEDURE — 97110 THERAPEUTIC EXERCISES: CPT

## 2023-09-05 NOTE — FLOWSHEET NOTE
from skilled physical therapy services in order to: decrease pain, incr ROM, and return to ADLs. STG: (to be met in 6 treatments)  ? Pain: 3/10. Met  ? ROM: > 90 Active flexion. Met  ? Function: Patient will ambulate with single point cane. Met  Patient to be independent with home exercise program as demonstrated by performance with correct form without cues. Met     LTG: (to be met in 16 treatments)  Patient will demonstrate normalized gait pattern. Patient will ascend and descend stairs step over step without use of rail. Patient will perform all house work without restriction. Patient will resume work as a . Patient goals: Return to work. Pt. Education:  [x] Yes  [] No  [x] Reviewed Prior HEP/Ed  Method of Education: [x] Verbal  [] Demo  [] Written  Comprehension of Education:  [] Verbalizes understanding. [x] Demonstrates understanding. [] Needs review. [] Demonstrates/verbalizes HEP/Ed previously given. Access Code: [email or web address]  URL: [employer]/  Date: 08/04/2023  Prepared by: Ivan Arriaga    Exercises  - Supine Gluteal Sets  - 3 x daily - 7 x weekly - 2 sets - 10 reps - 5 hold  - Supine Lower Trunk Rotation  - 3 x daily - 7 x weekly - 2 sets - 10 reps  - Supine Posterior Pelvic Tilt  - 3 x daily - 7 x weekly - 2 sets - 10 reps - 2 hold  - Supine March  - 3 x daily - 7 x weekly - 2 sets - 5 reps  - Supine Heel Slide  - 1 x daily - 7 x weekly - 3 sets - 10 reps  - Supine Hip Abduction  - 1 x daily - 7 x weekly - 3 sets - 10 reps  - Supine Knee Extension Strengthening  - 1 x daily - 7 x weekly - 3 sets - 10 reps  - Seated Long Arc Quad  - 1 x daily - 7 x weekly - 3 sets - 10 reps  - Standing 3-Way Kick  - 1 x daily - 7 x weekly - 3 sets - 10 reps  - Supine Bridge  - 1 x daily - 7 x weekly - 3 sets - 10 reps  - Clamshell  - 1 x daily - 7 x weekly - 3 sets - 10 reps    Plan: [x] Continue current frequency toward long and short term goals.     [x]

## 2023-09-07 ENCOUNTER — OFFICE VISIT (OUTPATIENT)
Dept: ORTHOPEDIC SURGERY | Age: 53
End: 2023-09-07

## 2023-09-07 VITALS — HEIGHT: 69 IN | WEIGHT: 162 LBS | RESPIRATION RATE: 14 BRPM | BODY MASS INDEX: 23.99 KG/M2

## 2023-09-07 DIAGNOSIS — Z96.641 S/P TOTAL RIGHT HIP ARTHROPLASTY: Primary | ICD-10-CM

## 2023-09-07 PROCEDURE — 99024 POSTOP FOLLOW-UP VISIT: CPT | Performed by: ORTHOPAEDIC SURGERY

## 2023-09-08 ENCOUNTER — HOSPITAL ENCOUNTER (OUTPATIENT)
Dept: PHYSICAL THERAPY | Facility: CLINIC | Age: 53
Setting detail: THERAPIES SERIES
Discharge: HOME | End: 2023-09-08
Attending: ORTHOPAEDIC SURGERY
Payer: COMMERCIAL

## 2023-09-08 PROCEDURE — 97110 THERAPEUTIC EXERCISES: CPT

## 2023-09-08 NOTE — FLOWSHEET NOTE
[] [address]  4600 [facility] [place] [facility].  [phone]  F: [phone] [] 204 [facility]  [address]   [address]  P: [phone]  F: [phone] [] [address]  151 [facility]  P: [phone]  F: [phone] [x] [facility] [place]: [phone]  F: [phone] [] [address] [facility]  [address] [facility]   [address]   P: [phone]  F: [phone]  [] [address] [facility].   P: [phone]  F: [phone] [] 205 [facility]  [address] [facility]   [address]  P: [phone]  F: [phone] [] 224 [facility]  [address] [facility]  [place]: [phone]  F: [phone] [] 1 [facility] [address]  [place]: [phone]  F: [phone]      Physical Therapy Daily Treatment Note    Date:  2023  Patient Name:  Lindsey Kuhn    :  1970  MRN: [id number]  Physician: Samaria Alvarado DO                                     Insurance: [employer] FEP; [place] yr; 50/50vs; no auth req; hard max; PT/OT/ST comb; $40 copay; no ded or coins; 6500/6,338.44 remaining OOP  Medical Diagnosis:   Z98.890 (ICD-10-CM) - Status post surgery   M16.11 (ICD-10-CM) - Arthritis of right hip   Rehab Codes: M25.551 Right hip pain  Onset date: 23               Next 's appt.: 8/3/23  Visit# / total visits:     Cancels/No Shows: 0/1    Subjective:    Pain:  [] Yes  [x] No Location: R hip Pain Rating: (0-10 scale) 0/10  Pain altered Tx:  [] No  [] Yes  Action:  Comments: Pt arrives reporting that he

## 2023-09-11 ENCOUNTER — HOSPITAL ENCOUNTER (OUTPATIENT)
Dept: PHYSICAL THERAPY | Facility: CLINIC | Age: 53
Setting detail: THERAPIES SERIES
Discharge: HOME | End: 2023-09-11
Attending: ORTHOPAEDIC SURGERY
Payer: COMMERCIAL

## 2023-09-11 PROCEDURE — 97110 THERAPEUTIC EXERCISES: CPT

## 2023-09-11 NOTE — FLOWSHEET NOTE
[] [address]  4600 [facility] [place] [facility].  [phone]  F: [phone] [] 204 [facility]  [address]   [address]  P: [phone]  F: [phone] [] [address]  151 [facility]  P: [phone]  F: [phone] [x] Marlo Karuna: [phone]  F: [phone] [] 224 [facility]  [address] [facility]   [address]   P: [phone]  F: [phone]  [] [address] [facility].   P: [phone]  F: [phone] [] 205 [facility]  [address] [facility]   [address]  P: [phone]  F: [phone] [] 224 [facility]  7[address] [facility]  [place]: [phone]  F: [phone] [] 1 [facility] [address]  [place]: [phone]  F: [phone]      Physical Therapy Daily Treatment Note    Date:  2023  Patient Name:  Triston Looney    :  1970  MRN: [id number]  Physician: Herbert Martell DO                                     Insurance: [place] FEP; [place] yr; 50/50vs; no auth req; hard max; PT/OT/ST comb; $40 copay; no ded or coins; 6500/6,338.44 remaining OOP  Medical Diagnosis:   Z98.890 (ICD-10-CM) - Status post surgery   M16.11 (ICD-10-CM) - Arthritis of right hip   Rehab Codes: M25.551 Right hip pain  Onset date: 23               Next 's appt.: 8/3/23  Visit# / total visits:     Cancels/No Shows: 0/1    Subjective:    Pain:  [] Yes  [x] No Location: R hip Pain Rating: (0-10 scale) 1-210  Pain altered Tx:  [] No  [] Yes  Action:  Comments: Pt reported that he has

## 2023-09-14 ENCOUNTER — HOSPITAL ENCOUNTER (OUTPATIENT)
Dept: PHYSICAL THERAPY | Facility: CLINIC | Age: 53
Setting detail: THERAPIES SERIES
Discharge: HOME | End: 2023-09-14
Attending: ORTHOPAEDIC SURGERY
Payer: COMMERCIAL

## 2023-09-14 NOTE — FLOWSHEET NOTE
[] [facility] ([facility]) - [facility] &  Therapy  [address] [facility] [address].    [phone]  F: [phone]   [] 204 [facility]  [address]   [address]  P: [phone]  F: [phone]  [] [address] &  Therapy  [address] [facility]  P: [phone]  F: [phone] [] [facility]  P: [phone]  F: [phone]  [] [address] [facility]   [address]   [place]: [phone]  F: [phone]   [] [address] [facility]  [address]  [place]: [phone]   F: [phone]     Physical Therapy Cancel/No Show note    Date: 2023  Patient: Arely Pickard  : 1970  MRN: [id number]    Cancels/No Shows to date:     For today's appointment patient:    [x]  Cancelled    [] Rescheduled appointment    [] No-show     Reason given by patient:    []  Patient ill    []  Conflicting appointment    [] No transportation      [] Conflict with work    [] No reason given    [] Weather related    [] COVID-19    [x] Other:      Comments:  No reason given, confirmed next appt      [] Next appointment was confirmed    Electronically signed by: Es Rivas PT

## 2023-09-21 ENCOUNTER — HOSPITAL ENCOUNTER (OUTPATIENT)
Dept: PHYSICAL THERAPY | Facility: CLINIC | Age: 53
Setting detail: THERAPIES SERIES
Discharge: HOME | End: 2023-09-21
Attending: ORTHOPAEDIC SURGERY
Payer: COMMERCIAL

## 2023-09-21 NOTE — FLOWSHEET NOTE
[] [facility] ([facility]) - [facility] &  Therapy  [address] [facility].    [phone]  F: [phone]   [] [address] [facility]  [address]   [address]  P: [phone]  F: [phone]  [] [address] &  Therapy  [address] [facility]  P: [phone]  F: [phone] [] Marlo Karuna  P: [phone]  F: [phone]  [] [address] [facility]   [address]   [place]: [phone]  F: [phone]   [] [address] [facility]  [address]  [place]: [phone]   F: [phone]     Physical Therapy Cancel/No Show note    Date: 2023  Patient: Rut Meraz  : 1970  MRN: [id number]    Cancels/No Shows to date: 1/3    For today's appointment patient:    []  Cancelled    [] Rescheduled appointment    [x] No-show     Reason given by patient:    []  Patient ill    []  Conflicting appointment    [] No transportation      [] Conflict with work    [] No reason given    [] Weather related    [] COVID-19    [] Other:      Comments:        [] Next appointment was confirmed    Electronically signed by: Margarita Patel PTA

## 2024-02-23 RX ORDER — HYDROXYZINE HYDROCHLORIDE 25 MG/1
TABLET, FILM COATED ORAL
Qty: 30 TABLET | Refills: 3 | Status: SHIPPED | OUTPATIENT
Start: 2024-02-23

## 2024-05-28 ENCOUNTER — OFFICE VISIT (OUTPATIENT)
Dept: FAMILY MEDICINE CLINIC | Age: 54
End: 2024-05-28
Payer: COMMERCIAL

## 2024-05-28 VITALS
SYSTOLIC BLOOD PRESSURE: 132 MMHG | HEIGHT: 69 IN | WEIGHT: 159 LBS | OXYGEN SATURATION: 97 % | DIASTOLIC BLOOD PRESSURE: 80 MMHG | BODY MASS INDEX: 23.55 KG/M2 | TEMPERATURE: 96.9 F | HEART RATE: 59 BPM

## 2024-05-28 DIAGNOSIS — Z12.5 SCREENING FOR MALIGNANT NEOPLASM OF PROSTATE: ICD-10-CM

## 2024-05-28 DIAGNOSIS — Z13.220 LIPID SCREENING: ICD-10-CM

## 2024-05-28 DIAGNOSIS — Z12.11 COLON CANCER SCREENING: ICD-10-CM

## 2024-05-28 DIAGNOSIS — I10 ESSENTIAL HYPERTENSION: ICD-10-CM

## 2024-05-28 DIAGNOSIS — Z00.00 ROUTINE GENERAL MEDICAL EXAMINATION AT A HEALTH CARE FACILITY: Primary | ICD-10-CM

## 2024-05-28 DIAGNOSIS — F45.8 BRUXISM (TEETH GRINDING): ICD-10-CM

## 2024-05-28 DIAGNOSIS — D69.6 THROMBOCYTOPENIA, UNSPECIFIED (HCC): ICD-10-CM

## 2024-05-28 PROCEDURE — 3079F DIAST BP 80-89 MM HG: CPT | Performed by: NURSE PRACTITIONER

## 2024-05-28 PROCEDURE — 99396 PREV VISIT EST AGE 40-64: CPT | Performed by: NURSE PRACTITIONER

## 2024-05-28 PROCEDURE — 3075F SYST BP GE 130 - 139MM HG: CPT | Performed by: NURSE PRACTITIONER

## 2024-05-28 RX ORDER — HYDROXYZINE HYDROCHLORIDE 25 MG/1
TABLET, FILM COATED ORAL
Qty: 30 TABLET | Refills: 3 | Status: SHIPPED | OUTPATIENT
Start: 2024-05-28

## 2024-05-28 RX ORDER — HYDRALAZINE HYDROCHLORIDE 25 MG/1
25 TABLET, FILM COATED ORAL 2 TIMES DAILY
Qty: 60 TABLET | Refills: 5 | Status: CANCELLED | OUTPATIENT
Start: 2024-05-28

## 2024-05-28 RX ORDER — METHOCARBAMOL 750 MG/1
TABLET, FILM COATED ORAL
Qty: 60 TABLET | Refills: 5 | Status: SHIPPED | OUTPATIENT
Start: 2024-05-28

## 2024-05-28 ASSESSMENT — PATIENT HEALTH QUESTIONNAIRE - PHQ9
SUM OF ALL RESPONSES TO PHQ9 QUESTIONS 1 & 2: 0
1. LITTLE INTEREST OR PLEASURE IN DOING THINGS: NOT AT ALL
SUM OF ALL RESPONSES TO PHQ QUESTIONS 1-9: 0
2. FEELING DOWN, DEPRESSED OR HOPELESS: NOT AT ALL

## 2024-05-28 ASSESSMENT — ENCOUNTER SYMPTOMS
RECTAL PAIN: 0
DIARRHEA: 0
NAUSEA: 0
COUGH: 0
RHINORRHEA: 0
SORE THROAT: 0
BACK PAIN: 0
CONSTIPATION: 0
SHORTNESS OF BREATH: 0
COLOR CHANGE: 0
EYE PAIN: 0
ABDOMINAL PAIN: 0
CHEST TIGHTNESS: 0

## 2024-05-28 NOTE — PROGRESS NOTES
Visit Information    Have you changed or started any medications since your last visit including any over-the-counter medicines, vitamins, or herbal medicines? no   Have you stopped taking any of your medications? Is so, why? -  no  Are you having any side effects from any of your medications? - no    Have you seen any other physician or provider since your last visit?  no   Have you had any other diagnostic tests since your last visit?  no   Have you been seen in the emergency room and/or had an admission in a hospital since we last saw you?  no   Have you had your routine dental cleaning in the past 6 months?  no     Do you have an active MyChart account? If no, what is the barrier?  Yes    Patient Care Team:  Luz Alvarado APRN - CNP as PCP - General (Family Nurse Practitioner)  Luz Alvarado APRN - CNP as PCP - Empaneled Provider    Medical History Review  Past Medical, Family, and Social History reviewed and  contribute to the patient presenting condition    Health Maintenance   Topic Date Due    Hepatitis B vaccine (1 of 3 - 3-dose series) Never done    Shingles vaccine (1 of 2) Never done    Colorectal Cancer Screen  02/25/2023    COVID-19 Vaccine (4 - 2023-24 season) 09/01/2023    Depression Screen  07/05/2024    Flu vaccine (Season Ended) 08/01/2024    Lipids  02/03/2026    DTaP/Tdap/Td vaccine (2 - Td or Tdap) 01/27/2031    Hepatitis C screen  Completed    HIV screen  Completed    Hepatitis A vaccine  Aged Out    Hib vaccine  Aged Out    Polio vaccine  Aged Out    Meningococcal (ACWY) vaccine  Aged Out    Pneumococcal 0-64 years Vaccine  Aged Out    Depression Monitoring  Discontinued    Diabetes screen  Discontinued

## 2024-05-28 NOTE — PROGRESS NOTES
2024    Freddie Santana (:  1970) is a 53 y.o. male, here for a preventive medicine evaluation.  Still has cologuard kit and will send back. No changes to health. Stopped all 3 BP meds about 6 months ago as BP was doing very well. Continues with Bruxism and would like higher dose of muscle relaxer as current dose is only lasting about 2-4 hours.   Subjective   Patient Active Problem List   Diagnosis    S/P total left hip arthroplasty    Status post total hip replacement, left    S/P total right hip arthroplasty    Thrombocytopenia, unspecified (HCC)       Review of Systems   Constitutional:  Negative for activity change, chills, fatigue, fever and unexpected weight change.   HENT:  Negative for congestion, ear pain, postnasal drip, rhinorrhea and sore throat.         Teeth grinding at night   Eyes:  Negative for pain and visual disturbance.   Respiratory:  Negative for cough, chest tightness and shortness of breath.    Cardiovascular:  Negative for chest pain, palpitations and leg swelling.   Gastrointestinal:  Negative for abdominal pain, constipation, diarrhea, nausea and rectal pain.   Endocrine: Negative for polydipsia, polyphagia and polyuria.   Genitourinary:  Negative for difficulty urinating, dysuria, hematuria, penile pain, penile swelling, scrotal swelling and testicular pain.   Musculoskeletal:  Negative for back pain.   Skin:  Negative for color change.   Neurological:  Negative for dizziness, weakness, numbness and headaches.   Psychiatric/Behavioral:  Positive for sleep disturbance. Negative for dysphoric mood. The patient is not nervous/anxious.        Prior to Visit Medications    Medication Sig Taking? Authorizing Provider   hydrOXYzine HCl (ATARAX) 25 MG tablet Take 1-2 tabs po QHS PRN for sleep Yes Luz Alvarado, APRN - CNP   methocarbamol (ROBAXIN-750) 750 MG tablet Take 1-2 tabs tid prn for TMJ Yes Luz Alvarado, APRN - CNP   aspirin 81 MG EC tablet Take 1 tablet by mouth

## 2024-07-23 DIAGNOSIS — Z96.642 S/P TOTAL LEFT HIP ARTHROPLASTY: Primary | ICD-10-CM

## 2024-07-23 DIAGNOSIS — Z96.641 S/P TOTAL RIGHT HIP ARTHROPLASTY: ICD-10-CM

## 2024-07-25 ENCOUNTER — OFFICE VISIT (OUTPATIENT)
Dept: ORTHOPEDIC SURGERY | Age: 54
End: 2024-07-25
Payer: COMMERCIAL

## 2024-07-25 VITALS — HEIGHT: 69 IN | BODY MASS INDEX: 23.4 KG/M2 | WEIGHT: 158 LBS | RESPIRATION RATE: 14 BRPM

## 2024-07-25 DIAGNOSIS — M25.562 LEFT KNEE PAIN, UNSPECIFIED CHRONICITY: ICD-10-CM

## 2024-07-25 DIAGNOSIS — Z96.642 S/P TOTAL LEFT HIP ARTHROPLASTY: ICD-10-CM

## 2024-07-25 DIAGNOSIS — M25.562 LEFT KNEE PAIN, UNSPECIFIED CHRONICITY: Primary | ICD-10-CM

## 2024-07-25 DIAGNOSIS — Z96.641 S/P TOTAL RIGHT HIP ARTHROPLASTY: Primary | ICD-10-CM

## 2024-07-25 PROCEDURE — 20610 DRAIN/INJ JOINT/BURSA W/O US: CPT | Performed by: ORTHOPAEDIC SURGERY

## 2024-07-25 PROCEDURE — 99214 OFFICE O/P EST MOD 30 MIN: CPT | Performed by: ORTHOPAEDIC SURGERY

## 2024-07-29 RX ORDER — BUPIVACAINE HYDROCHLORIDE 2.5 MG/ML
2 INJECTION, SOLUTION INFILTRATION; PERINEURAL ONCE
Status: SHIPPED | OUTPATIENT
Start: 2024-07-29

## 2024-07-29 RX ORDER — METHYLPREDNISOLONE ACETATE 80 MG/ML
80 INJECTION, SUSPENSION INTRA-ARTICULAR; INTRALESIONAL; INTRAMUSCULAR; SOFT TISSUE ONCE
Status: SHIPPED | OUTPATIENT
Start: 2024-07-29

## 2024-07-29 ASSESSMENT — ENCOUNTER SYMPTOMS
ABDOMINAL PAIN: 0
SHORTNESS OF BREATH: 0
ROS SKIN COMMENTS: NEGATIVE FOR RASH
EYE DISCHARGE: 0

## 2024-07-29 NOTE — PROGRESS NOTES
[facility] PHYSICIANS [facility], [facility] ORTHOPEDICS AND SPORTS MEDICINE  [address] [facility]  [address] [place] [address]  Dept: [phone]  Dept Fax: [phone]        Ambulatory Follow Up      Subjective:   Freddie Santana is a 54 y.o. year old male who presents to our office today for routine followup regarding his   1. S/P total right hip arthroplasty    2. Left knee pain, unspecified chronicity    3. S/P total left hip arthroplasty    .    Chief Complaint   Patient presents with    Follow-up     Bilateral hip.  Right PATSY DOS 7/18/23  Left PATSY 6/13/24       HPI  Freddie Santana is a 54-year-old male who presents the office today for recheck.  He notes persistent left knee pain.  He has had previous right total hip arthroplasty and also left total hip arthroplasty with periprosthetic fracture and repair of the fracture previously.  He is doing well with both hips.  He notes pain progressively worsening since Monday to the left knee.  He denies any significant injuries.  He has tried moderate modifying his activities without significant improvement in his symptoms.    Review of Systems   Constitutional:  Positive for activity change. Negative for fever.   HENT:  Negative for dental problem.    Eyes:  Negative for discharge.   Respiratory:  Negative for shortness of breath.    Cardiovascular:  Negative for chest pain.   Gastrointestinal:  Negative for abdominal pain.   Genitourinary: Negative.    Musculoskeletal:  Positive for arthralgias.   Skin:         Negative for rash   Neurological:  Positive for weakness.   Psychiatric/Behavioral:  Negative for confusion.        I have reviewed the CC, HPI, ROS, PMH, FHX, Social History, and if not present in this note, I have reviewed in the patient's chart.   I agree with the documentation provided by other staff and have reviewed their documentation prior to providing my signature indicating agreement.    Objective :   Resp 14

## 2024-08-02 DIAGNOSIS — M76.892 HAMSTRING TENDONITIS OF LEFT THIGH: ICD-10-CM

## 2024-08-02 DIAGNOSIS — M16.0 BILATERAL HIP JOINT ARTHRITIS: ICD-10-CM

## 2024-08-02 RX ORDER — MELOXICAM 15 MG/1
15 TABLET ORAL DAILY
Qty: 30 TABLET | Refills: 2 | Status: SHIPPED | OUTPATIENT
Start: 2024-08-02

## 2024-08-02 RX ORDER — MELOXICAM 15 MG/1
15 TABLET ORAL DAILY
Qty: 30 TABLET | Refills: 2 | OUTPATIENT
Start: 2024-08-02

## 2024-09-18 ENCOUNTER — HOSPITAL ENCOUNTER (OUTPATIENT)
Age: 54
Discharge: HOME | End: 2024-09-18

## 2024-09-18 DIAGNOSIS — Z13.220 LIPID SCREENING: ICD-10-CM

## 2024-09-18 DIAGNOSIS — Z00.00 ROUTINE GENERAL MEDICAL EXAMINATION AT A HEALTH CARE FACILITY: ICD-10-CM

## 2024-09-18 DIAGNOSIS — Z12.5 SCREENING FOR MALIGNANT NEOPLASM OF PROSTATE: ICD-10-CM

## 2024-09-23 ENCOUNTER — HOSPITAL ENCOUNTER (OUTPATIENT)
Age: 54
Discharge: HOME | End: 2024-09-23
Payer: COMMERCIAL

## 2024-09-23 LAB
ALBUMIN SERPL-MCNC: 4.7 G/DL (ref 3.5–5.2)
ALBUMIN/GLOB SERPL: 2 {RATIO} (ref 1–2.5)
ALP SERPL-CCNC: 88 U/L (ref 40–129)
ALT SERPL-CCNC: 37 U/L (ref 10–50)
ANION GAP SERPL CALCULATED.3IONS-SCNC: 14 MMOL/L (ref 9–16)
AST SERPL-CCNC: 56 U/L (ref 10–50)
BASOPHILS # BLD: 0.05 K/UL (ref 0–0.2)
BASOPHILS NFR BLD: 1 % (ref 0–2)
BILIRUB SERPL-MCNC: 0.5 MG/DL (ref 0–1.2)
BUN SERPL-MCNC: 10 MG/DL (ref 6–20)
CALCIUM SERPL-MCNC: 9.4 MG/DL (ref 8.6–10.4)
CHLORIDE SERPL-SCNC: 101 MMOL/L (ref 98–107)
CHOLEST SERPL-MCNC: 243 MG/DL (ref 0–199)
CHOLESTEROL/HDL RATIO: 2
CO2 SERPL-SCNC: 25 MMOL/L (ref 20–31)
CREAT SERPL-MCNC: 0.7 MG/DL (ref 0.7–1.2)
EOSINOPHIL # BLD: 0.09 K/UL (ref 0–0.44)
EOSINOPHILS RELATIVE PERCENT: 2 % (ref 1–4)
ERYTHROCYTE [DISTWIDTH] IN BLOOD BY AUTOMATED COUNT: 12.5 % (ref 11.8–14.4)
GFR, ESTIMATED: >90 ML/MIN/1.73M2
GLUCOSE SERPL-MCNC: 83 MG/DL (ref 74–99)
HCT VFR BLD AUTO: 48.5 % (ref 40.7–50.3)
HDLC SERPL-MCNC: 127 MG/DL
HGB BLD-MCNC: 15.8 G/DL (ref 13–17)
IMM GRANULOCYTES # BLD AUTO: <0.03 K/UL (ref 0–0.3)
IMM GRANULOCYTES NFR BLD: 0 %
LDLC SERPL CALC-MCNC: 94 MG/DL (ref 0–100)
LYMPHOCYTES NFR BLD: 2.2 K/UL (ref 1.1–3.7)
LYMPHOCYTES RELATIVE PERCENT: 40 % (ref 24–43)
MCH RBC QN AUTO: 31.7 PG (ref 25.2–33.5)
MCHC RBC AUTO-ENTMCNC: 32.6 G/DL (ref 28.4–34.8)
MCV RBC AUTO: 97.4 FL (ref 82.6–102.9)
MONOCYTES NFR BLD: 0.64 K/UL (ref 0.1–1.2)
MONOCYTES NFR BLD: 12 % (ref 3–12)
NEUTROPHILS NFR BLD: 45 % (ref 36–65)
NEUTS SEG NFR BLD: 2.46 K/UL (ref 1.5–8.1)
NRBC BLD-RTO: 0 PER 100 WBC
PLATELET # BLD AUTO: 161 K/UL (ref 138–453)
PMV BLD AUTO: 10.7 FL (ref 8.1–13.5)
POTASSIUM SERPL-SCNC: 4.3 MMOL/L (ref 3.7–5.3)
PROT SERPL-MCNC: 7.3 G/DL (ref 6.6–8.7)
PSA SERPL-MCNC: 2.9 NG/ML (ref 0–4)
RBC # BLD AUTO: 4.98 M/UL (ref 4.21–5.77)
SODIUM SERPL-SCNC: 140 MMOL/L (ref 136–145)
TRIGL SERPL-MCNC: 112 MG/DL
VLDLC SERPL CALC-MCNC: 22 MG/DL
WBC OTHER # BLD: 5.5 K/UL (ref 3.5–11.3)

## 2024-09-23 PROCEDURE — 80053 COMPREHEN METABOLIC PANEL: CPT

## 2024-09-23 PROCEDURE — 84153 ASSAY OF PSA TOTAL: CPT

## 2024-09-23 PROCEDURE — 80061 LIPID PANEL: CPT

## 2024-09-23 PROCEDURE — 36415 COLL VENOUS BLD VENIPUNCTURE: CPT

## 2024-09-23 PROCEDURE — 85025 COMPLETE CBC W/AUTO DIFF WBC: CPT

## 2024-09-25 RX ORDER — HYDROXYZINE HYDROCHLORIDE 25 MG/1
TABLET, FILM COATED ORAL
Qty: 30 TABLET | Refills: 3 | Status: SHIPPED | OUTPATIENT
Start: 2024-09-25

## 2025-04-18 RX ORDER — HYDROXYZINE HYDROCHLORIDE 25 MG/1
TABLET, FILM COATED ORAL
Qty: 30 TABLET | Refills: 3 | Status: SHIPPED | OUTPATIENT
Start: 2025-04-18

## 2025-06-10 ENCOUNTER — OFFICE VISIT (OUTPATIENT)
Age: 55
End: 2025-06-10
Payer: COMMERCIAL

## 2025-06-10 VITALS — BODY MASS INDEX: 23.4 KG/M2 | HEIGHT: 69 IN | WEIGHT: 158 LBS

## 2025-06-10 DIAGNOSIS — Z96.642 S/P TOTAL LEFT HIP ARTHROPLASTY: Primary | ICD-10-CM

## 2025-06-10 PROCEDURE — 99214 OFFICE O/P EST MOD 30 MIN: CPT

## 2025-06-10 NOTE — PROGRESS NOTES
[facility] Orthopaedics & Sports Medicine      [facility] ORTHOPAEDICS AND SPORTS MEDICINE  [address]  [place] [address]  Dept: [phone]  Dept Fax: [phone]    Chief Compliant:  Chief Complaint   Patient presents with    Pain     L Femur        History of Present Illness:  This is a pleasant 54 y.o. male who presents to the clinic today for evaluation / follow up of left hip pain.  Patient total hip arthroplasty done by Dr. Bender however he ended having a fall in his postoperative time period.  He ended up going to the ER and had a periprosthetic hip fracture.  Dr. Bennett ended taking him to surgery and put a plate that extended over the greater trochanter with cables and screws for additional fixation.  Patient has been doing pretty well since then however he states that the hip is quite achy from time to time.  He works at the [employer] and is on his feet all day.  He works 12-hour shifts and by the end of his shifts he has quite a bit of pain which makes it difficult for him to walk and ambulate around especially in the day and even into the next day as well.  He states the stiffness and discomfort have not really worsened over the past year or so however it sometimes impedes him to be able to do his job.  He presents today for further evaluation and treatment.      Physical Exam:    Left hip: Scar from incision extends into the lateral aspect of the distal fibula.  He has negative logroll test.  He has no significant pain with hip flexion, internal rotation, external rotation.  He has no centimeters pain with Stinchfield test today.  He does have some tenderness to patient the greater trochanter bursa however he does also have hardware in this area.  No significant tenderness over the groin line.    Nursing note and vitals reviewed.     Labs and Imaging:   X-rays from July 2024 were reviewed today which show status

## (undated) DEVICE — YANKAUER,FLEXIBLE HANDLE,REGLR CAPACITY: Brand: MEDLINE INDUSTRIES, INC.

## (undated) DEVICE — SOLUTION IV IRRIG 500ML 0.9% SODIUM CHL 2F7123

## (undated) DEVICE — GLOVE SURG SZ 85 CRM LTX FREE POLYISOPRENE POLYMER BEAD ANTI

## (undated) DEVICE — 6619 2 PTNT ISO SYS INCISE AREA&LT;(&GT;&&LT;)&GT;P: Brand: STERI-DRAPE™ IOBAN™ 2

## (undated) DEVICE — OSCILLATING TIP SAW CARTRIDGE: Brand: PRECISION FALCON

## (undated) DEVICE — SUTURE STRATAFIX SPRL SZ 3-0 L5IN ABSRB UD FS L26MM 3/8 CIR SXMP2B411

## (undated) DEVICE — ELECTRODE PT RET AD L9FT HI MOIST COND ADH HYDRGEL CORDED

## (undated) DEVICE — 4-PORT MANIFOLD: Brand: NEPTUNE 2

## (undated) DEVICE — SURGICAL PROCEDURE KIT BASIN MAJ SET UP

## (undated) DEVICE — SUTURE ABSRB BRAID COAT UD CP NO 2 27IN VCRL J195H

## (undated) DEVICE — SOLUTION IV 250ML 0.9% SOD CHL PH 5 INJ USP VIAFLX PLAS

## (undated) DEVICE — SUTURE STRATAFIX SPRL SZ 1 L14IN ABSRB VLT L48CM CTX 1/2 SXPD2B405

## (undated) DEVICE — SPONGE LAP W18XL18IN WHT COT 4 PLY FLD STRUNG RADPQ DISP ST

## (undated) DEVICE — WEREWOLF FASTSEAL 6.0

## (undated) DEVICE — LIMB HOLDER, QUICK RELEASE, 60" STRAP: Brand: MEDLINE

## (undated) DEVICE — DRESSING FOAM W4XL10IN AG SIL ADH ANTIMIC POSTOP OPTIFOAM

## (undated) DEVICE — BLADE ES L6IN ELASTOMERIC COAT EXT DURABLE BEND UPTO 90DEG

## (undated) DEVICE — APPLICATOR MEDICATED 26 CC SOLUTION HI LT ORNG CHLORAPREP

## (undated) DEVICE — WOUND RETRACTOR AND PROTECTOR: Brand: ALEXIS WOUND PROTECTOR-RETRACTOR

## (undated) DEVICE — TOWEL,OR,DSP,ST,BLUE,DLX,XR,4/PK,20PK/CS: Brand: MEDLINE

## (undated) DEVICE — C-ARM: Brand: UNBRANDED

## (undated) DEVICE — ERASECAUTI INTERMIT TRAY: Brand: MEDLINE INDUSTRIES, INC.

## (undated) DEVICE — SUTURE VCRL SZ 0 L36IN ABSRB UD L36MM CT-1 1/2 CIR J946H

## (undated) DEVICE — SYRINGE IRRIG 60ML SFT PLIABLE BLB EZ TO GRP 1 HND USE W/

## (undated) DEVICE — BIPOLAR SEALER 23-112-1 AQM 6.0: Brand: AQUAMANTYS ®

## (undated) DEVICE — ADHESIVE SKIN CLOSURE 0.7CC TOP MICROBIAL APPL DERMBND ADV

## (undated) DEVICE — STERILE PATIENT PROTECTIVE PAD FOR IMP® KNEE POSITIONERS & COHESIVE WRAP (10 / CASE): Brand: DE MAYO KNEE POSITIONER®

## (undated) DEVICE — COVER,MAYO STAND,XL,STERILE: Brand: MEDLINE

## (undated) DEVICE — NEPTUNE E-SEP 165MM SUCTION SLEEVE: Brand: NEPTUNE E-SEP

## (undated) DEVICE — ZIPPERED TOGA, 2X LARGE: Brand: FLYTE, SURGICOOL

## (undated) DEVICE — COVER,TABLE,HEAVY DUTY,50"X90",STRL: Brand: MEDLINE

## (undated) DEVICE — LIQUIBAND RAPID ADHESIVE 36/CS 0.8ML: Brand: MEDLINE

## (undated) DEVICE — 450 ML BOTTLE OF 0.05% CHLORHEXIDINE GLUCONATE IN 99.95% STERILE WATER FOR IRRIGATION, USP AND APPLICATOR.: Brand: IRRISEPT ANTIMICROBIAL WOUND LAVAGE

## (undated) DEVICE — SUTURE STRATAFIX SPRL SZ 2-0 L9IN ABSRB VLT MH L36MM 1/2 SXPD2B408